# Patient Record
Sex: MALE | Race: WHITE | NOT HISPANIC OR LATINO | URBAN - METROPOLITAN AREA
[De-identification: names, ages, dates, MRNs, and addresses within clinical notes are randomized per-mention and may not be internally consistent; named-entity substitution may affect disease eponyms.]

---

## 2017-02-04 ENCOUNTER — EMERGENCY (EMERGENCY)
Facility: HOSPITAL | Age: 82
LOS: 1 days | Discharge: HOME | End: 2017-02-04
Attending: EMERGENCY MEDICINE | Admitting: EMERGENCY MEDICINE
Payer: MEDICARE

## 2017-02-04 VITALS
SYSTOLIC BLOOD PRESSURE: 140 MMHG | HEART RATE: 60 BPM | DIASTOLIC BLOOD PRESSURE: 86 MMHG | OXYGEN SATURATION: 98 % | RESPIRATION RATE: 18 BRPM | TEMPERATURE: 97 F

## 2017-02-04 VITALS
SYSTOLIC BLOOD PRESSURE: 157 MMHG | DIASTOLIC BLOOD PRESSURE: 78 MMHG | RESPIRATION RATE: 18 BRPM | OXYGEN SATURATION: 97 % | TEMPERATURE: 98 F | HEART RATE: 79 BPM
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
## 2017-02-04 DIAGNOSIS — Z86.79 PERSONAL HISTORY OF OTHER DISEASES OF THE CIRCULATORY SYSTEM: ICD-10-CM

## 2017-02-04 DIAGNOSIS — R10.9 UNSPECIFIED ABDOMINAL PAIN: ICD-10-CM
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
## 2017-02-04 PROCEDURE — 12005 RPR S/N/A/GEN/TRK12.6-20.0CM: CPT

## 2017-02-04 PROCEDURE — G0168: CPT | Mod: 59

## 2017-02-04 PROCEDURE — 99283 EMERGENCY DEPT VISIT LOW MDM: CPT | Mod: 25,GC

## 2017-02-04 PROCEDURE — 12001 RPR S/N/AX/GEN/TRNK 2.5CM/<: CPT | Mod: GC

## 2017-02-04 PROCEDURE — 99282 EMERGENCY DEPT VISIT SF MDM: CPT | Mod: 25

## 2017-02-04 RX ORDER — CEPHALEXIN 500 MG
1 CAPSULE ORAL
Qty: 30 | Refills: 0 | OUTPATIENT
Start: 2017-02-04 | End: 2017-02-14

## 2017-02-04 RX ORDER — CEPHALEXIN 500 MG
500 CAPSULE ORAL ONCE
Qty: 0 | Refills: 0 | Status: DISCONTINUED | OUTPATIENT
Start: 2017-02-04 | End: 2017-02-08

## 2017-02-04 NOTE — ED PROVIDER NOTE - MEDICAL DECISION MAKING DETAILS
ATTD: skin tear, skin repeat with dermabond, last tetanus approx 5 yrs ago., refuses pain medication.

## 2017-02-04 NOTE — ED PROCEDURE NOTE - PROCEDURE ADDITIONAL DETAILS
Please note all procedures were performed on Feb 4th at the time of the patient care that day. All procedure notes were entered in at a later time.

## 2017-02-04 NOTE — ED PROCEDURE NOTE - NS ED PROCEDURE ASSISTED BY
The procedure was performed independently
The procedure was performed independently
Supervision was available

## 2017-02-04 NOTE — ED PROCEDURE NOTE - CPROC ED POST PROC CARE GUIDE1
Verbal/written post procedure instructions were given to patient/caregiver.
Verbal/written post procedure instructions were given to patient/caregiver.

## 2017-02-04 NOTE — ED PROVIDER NOTE - ATTENDING CONTRIBUTION TO CARE
87 y/o m with pmhx afib on ac presents for bleeding from left arm / forearm and right knee. Was walking in the hospital getting some items from the cafeteria and tripped and fell. + skin tear. no LOC. no vomiting. uknown las tet.   Gen. no acute distress, Non toxic   HEENT: EOMI, mmm,   Lungs: CTAB/L no C/ W /R   CVS: S1S2   Abd; Soft non tender, non distended   Ext: skin tear of left forearm on the lateral surface approx 8 cm in length . second skin tear on left forearm on distal medial aspect. approx 6 cm. min active bleeding. no tenderness. no deformity of extretmities.   right leg approx 3 cm skin tear at prox tibia. no tenderness. no head trauma. no loc. had prior skin tears on lower legs distal to areas of new tears that he had followed with last week with wound management at [place].   Neuro AAOx3 non focal clear speech

## 2017-02-04 NOTE — ED PROVIDER NOTE - CARE PLAN
Principal Discharge DX:	Laceration of multiple sites of left upper extremity, initial encounter  Instructions for follow-up, activity and diet:	1.Take Tylenol as needed for pain  2. Keep wounds clean and dry  3. Take  Secondary Diagnosis:	Lacerations of multiple sites of right leg, initial encounter Principal Discharge DX:	Laceration of multiple sites of left upper extremity, initial encounter  Instructions for follow-up, activity and diet:	1. Take Tylenol as needed for pain  2. Keep wounds clean and dry, do not get wet for two days  3. Take Keflex as prescribed three times a day  4. Follow-up in emergency department or with primary care doctor in two days for wound check  5. Follow-up in emergency department or with primary care doctor in 7-10 days for suture removal (you had 4 sutures placed)  6. Return immediately for any worsening or concerning symptoms  Secondary Diagnosis:	Lacerations of multiple sites of right leg, initial encounter

## 2017-02-04 NOTE — ED PROVIDER NOTE - PLAN OF CARE
1.Take Tylenol as needed for pain  2. Keep wounds clean and dry  3. Take 1. Take Tylenol as needed for pain  2. Keep wounds clean and dry, do not get wet for two days  3. Take Keflex as prescribed three times a day  4. Follow-up in emergency department or with primary care doctor in two days for wound check  5. Follow-up in emergency department or with primary care doctor in 7-10 days for suture removal (you had 4 sutures placed)  6. Return immediately for any worsening or concerning symptoms

## 2017-02-04 NOTE — ED PROCEDURE NOTE - CPROC ED TOLERANCE1
Patient tolerated procedure well.

## 2017-02-04 NOTE — ED PROVIDER NOTE - OBJECTIVE STATEMENT
86M with [facility] of 86M, pmh of a-fib but on 81mg asa only, presents with skin tears/laceration to L forearm, R shin. pt was walking in cafeteria at hospital, tripped and fell forward. no head injury. no LOC. no headache, dizzines. no cp, sob, dizziness, palpitations prior to fall. no numbness, weakness, change in vision, nausea vomiting.

## 2017-02-04 NOTE — ED ADULT NURSE NOTE - OBJECTIVE STATEMENT
86 yr old male coming in s/p mechanical fall outside; denies hitting head, no LOC; on baby aspirin. Patient has significant skin tears on left arm/forearm; also on right knee. Patient A&Ox3 denies pain, moves all extremities. No chest pain, sob, n/v/d, fevers, chills. +PERRL. Clear speech. 86 yr old male coming in s/p mechanical fall outside; denies hitting head, no LOC; on baby aspirin. Patient has significant skin tears on left arm/forearm; also on right knee. Patient A&Ox3 denies pain, moves all extremities. No chest pain, sob, n/v/d, fevers, chills. +PERRL. Clear speech. Tetanus up to date.

## 2017-02-04 NOTE — ED ADULT NURSE REASSESSMENT NOTE - NS ED NURSE REASSESS COMMENT FT1
Report received from mc escobedo in [facility]; patient resting comfortably in stretcher; awaiting MD intervention with dermabond; safety and comfort measures provided; a&ox3

## 2018-05-31 PROBLEM — Z00.00 ENCOUNTER FOR PREVENTIVE HEALTH EXAMINATION: Status: ACTIVE | Noted: 2018-05-31

## 2018-06-04 ENCOUNTER — APPOINTMENT (OUTPATIENT)
Dept: SURGICAL ONCOLOGY | Facility: CLINIC | Age: 83
End: 2018-06-04
Payer: MEDICARE

## 2018-06-04 DIAGNOSIS — Z78.9 OTHER SPECIFIED HEALTH STATUS: ICD-10-CM

## 2018-06-04 DIAGNOSIS — Z86.79 PERSONAL HISTORY OF OTHER DISEASES OF THE CIRCULATORY SYSTEM: ICD-10-CM

## 2018-06-04 DIAGNOSIS — Z87.2 PERSONAL HISTORY OF DISEASES OF THE SKIN AND SUBCUTANEOUS TISSUE: ICD-10-CM

## 2018-06-04 DIAGNOSIS — Z86.69 PERSONAL HISTORY OF OTHER DISEASES OF THE NERVOUS SYSTEM AND SENSE ORGANS: ICD-10-CM

## 2018-06-04 DIAGNOSIS — I51.9 HEART DISEASE, UNSPECIFIED: ICD-10-CM

## 2018-06-04 DIAGNOSIS — Z86.2 PERSONAL HISTORY OF DISEASES OF THE BLOOD AND BLOOD-FORMING ORGANS AND CERTAIN DISORDERS INVOLVING THE IMMUNE MECHANISM: ICD-10-CM

## 2018-06-04 PROCEDURE — 99204 OFFICE O/P NEW MOD 45 MIN: CPT

## 2018-06-04 RX ORDER — APIXABAN 5 MG/1
5 TABLET, FILM COATED ORAL
Refills: 0 | Status: ACTIVE | COMMUNITY

## 2018-06-04 RX ORDER — AMLODIPINE BESYLATE 5 MG/1
5 TABLET ORAL
Refills: 0 | Status: ACTIVE | COMMUNITY

## 2018-06-04 RX ORDER — ATORVASTATIN CALCIUM 10 MG/1
10 TABLET, FILM COATED ORAL
Refills: 0 | Status: ACTIVE | COMMUNITY

## 2018-06-04 RX ORDER — FUROSEMIDE 20 MG/1
20 TABLET ORAL
Refills: 0 | Status: ACTIVE | COMMUNITY

## 2018-06-06 ENCOUNTER — FORM ENCOUNTER (OUTPATIENT)
Age: 83
End: 2018-06-06

## 2018-06-07 ENCOUNTER — APPOINTMENT (OUTPATIENT)
Dept: NUCLEAR MEDICINE | Facility: IMAGING CENTER | Age: 83
End: 2018-06-07
Payer: MEDICARE

## 2018-06-07 ENCOUNTER — OUTPATIENT (OUTPATIENT)
Dept: OUTPATIENT SERVICES | Facility: HOSPITAL | Age: 83
LOS: 1 days | End: 2018-06-07
Payer: MEDICARE

## 2018-06-07 DIAGNOSIS — C43.71 MALIGNANT MELANOMA OF RIGHT LOWER LIMB, INCLUDING HIP: ICD-10-CM

## 2018-06-07 PROCEDURE — 78816 PET IMAGE W/CT FULL BODY: CPT | Mod: 26,PI

## 2018-06-07 PROCEDURE — A9552: CPT

## 2018-06-07 PROCEDURE — 78816 PET IMAGE W/CT FULL BODY: CPT

## 2018-06-14 ENCOUNTER — OUTPATIENT (OUTPATIENT)
Dept: OUTPATIENT SERVICES | Facility: HOSPITAL | Age: 83
LOS: 1 days | End: 2018-06-14
Payer: MEDICARE

## 2018-06-14 ENCOUNTER — RESULT REVIEW (OUTPATIENT)
Age: 83
End: 2018-06-14

## 2018-06-14 DIAGNOSIS — L98.9 DISORDER OF THE SKIN AND SUBCUTANEOUS TISSUE, UNSPECIFIED: ICD-10-CM

## 2018-06-14 PROCEDURE — 88321 CONSLTJ&REPRT SLD PREP ELSWR: CPT

## 2018-06-20 ENCOUNTER — OUTPATIENT (OUTPATIENT)
Dept: OUTPATIENT SERVICES | Facility: HOSPITAL | Age: 83
LOS: 1 days | Discharge: HOME | End: 2018-06-20

## 2018-06-20 DIAGNOSIS — C43.9 MALIGNANT MELANOMA OF SKIN, UNSPECIFIED: ICD-10-CM

## 2018-06-20 DIAGNOSIS — C34.90 MALIGNANT NEOPLASM OF UNSPECIFIED PART OF UNSPECIFIED BRONCHUS OR LUNG: ICD-10-CM

## 2018-06-21 ENCOUNTER — APPOINTMENT (OUTPATIENT)
Dept: HEMATOLOGY ONCOLOGY | Facility: CLINIC | Age: 83
End: 2018-06-21
Payer: MEDICARE

## 2018-06-21 VITALS
DIASTOLIC BLOOD PRESSURE: 74 MMHG | OXYGEN SATURATION: 98 % | SYSTOLIC BLOOD PRESSURE: 172 MMHG | BODY MASS INDEX: 22.99 KG/M2 | TEMPERATURE: 97.5 F | HEART RATE: 80 BPM | HEIGHT: 70.98 IN | RESPIRATION RATE: 16 BRPM | WEIGHT: 164.24 LBS

## 2018-06-21 DIAGNOSIS — Z95.0 PRESENCE OF CARDIAC PACEMAKER: ICD-10-CM

## 2018-06-21 PROCEDURE — 99205 OFFICE O/P NEW HI 60 MIN: CPT

## 2018-06-28 ENCOUNTER — APPOINTMENT (OUTPATIENT)
Dept: INFUSION THERAPY | Facility: HOSPITAL | Age: 83
End: 2018-06-28

## 2018-07-05 LAB — SURGICAL PATHOLOGY STUDY: SIGNIFICANT CHANGE UP

## 2018-07-18 ENCOUNTER — RESULT REVIEW (OUTPATIENT)
Age: 83
End: 2018-07-18

## 2018-07-18 ENCOUNTER — LABORATORY RESULT (OUTPATIENT)
Age: 83
End: 2018-07-18

## 2018-07-18 ENCOUNTER — APPOINTMENT (OUTPATIENT)
Dept: HEMATOLOGY ONCOLOGY | Facility: CLINIC | Age: 83
End: 2018-07-18
Payer: MEDICARE

## 2018-07-18 VITALS
SYSTOLIC BLOOD PRESSURE: 187 MMHG | BODY MASS INDEX: 22.92 KG/M2 | DIASTOLIC BLOOD PRESSURE: 85 MMHG | OXYGEN SATURATION: 97 % | HEART RATE: 68 BPM | TEMPERATURE: 97.7 F | WEIGHT: 164.24 LBS | RESPIRATION RATE: 16 BRPM

## 2018-07-18 DIAGNOSIS — T45.1X5A NAUSEA WITH VOMITING, UNSPECIFIED: ICD-10-CM

## 2018-07-18 DIAGNOSIS — R11.2 NAUSEA WITH VOMITING, UNSPECIFIED: ICD-10-CM

## 2018-07-18 LAB
ALBUMIN SERPL ELPH-MCNC: 4.5 G/DL
ALP BLD-CCNC: 82 U/L
ALT SERPL-CCNC: 19 U/L
ANION GAP SERPL CALC-SCNC: 16 MMOL/L
APTT BLD: 28.8 SEC
AST SERPL-CCNC: 21 U/L
BASOPHILS # BLD AUTO: 0 K/UL — SIGNIFICANT CHANGE UP (ref 0–0.2)
BASOPHILS NFR BLD AUTO: 0.2 % — SIGNIFICANT CHANGE UP (ref 0–2)
BILIRUB SERPL-MCNC: 0.4 MG/DL
BUN SERPL-MCNC: 31 MG/DL
CALCIUM SERPL-MCNC: 9.6 MG/DL
CHLORIDE SERPL-SCNC: 102 MMOL/L
CO2 SERPL-SCNC: 26 MMOL/L
CREAT SERPL-MCNC: 1.89 MG/DL
EOSINOPHIL # BLD AUTO: 0.3 K/UL — SIGNIFICANT CHANGE UP (ref 0–0.5)
EOSINOPHIL NFR BLD AUTO: 2.3 % — SIGNIFICANT CHANGE UP (ref 0–6)
GLUCOSE SERPL-MCNC: 112 MG/DL
HCT VFR BLD CALC: 39.3 % — SIGNIFICANT CHANGE UP (ref 39–50)
HGB BLD-MCNC: 13.2 G/DL — SIGNIFICANT CHANGE UP (ref 13–17)
INR PPP: 1.02 RATIO
LYMPHOCYTES # BLD AUTO: 1.1 K/UL — SIGNIFICANT CHANGE UP (ref 1–3.3)
LYMPHOCYTES # BLD AUTO: 9.4 % — LOW (ref 13–44)
MCHC RBC-ENTMCNC: 28.4 PG — SIGNIFICANT CHANGE UP (ref 27–34)
MCHC RBC-ENTMCNC: 33.7 G/DL — SIGNIFICANT CHANGE UP (ref 32–36)
MCV RBC AUTO: 84.3 FL — SIGNIFICANT CHANGE UP (ref 80–100)
MONOCYTES # BLD AUTO: 0.8 K/UL — SIGNIFICANT CHANGE UP (ref 0–0.9)
MONOCYTES NFR BLD AUTO: 7 % — SIGNIFICANT CHANGE UP (ref 2–14)
NEUTROPHILS # BLD AUTO: 9.5 K/UL — HIGH (ref 1.8–7.4)
NEUTROPHILS NFR BLD AUTO: 81.1 % — HIGH (ref 43–77)
PLATELET # BLD AUTO: 267 K/UL — SIGNIFICANT CHANGE UP (ref 150–400)
POTASSIUM SERPL-SCNC: 4.8 MMOL/L
PROT SERPL-MCNC: 6.7 G/DL
PT BLD: 11.5 SEC
RBC # BLD: 4.67 M/UL — SIGNIFICANT CHANGE UP (ref 4.2–5.8)
RBC # FLD: 14.8 % — HIGH (ref 10.3–14.5)
SODIUM SERPL-SCNC: 144 MMOL/L
T3RU NFR SERPL: 1.01 INDEX
T4 SERPL-MCNC: 5.7 UG/DL
WBC # BLD: 11.7 K/UL — HIGH (ref 3.8–10.5)
WBC # FLD AUTO: 11.7 K/UL — HIGH (ref 3.8–10.5)

## 2018-07-18 PROCEDURE — 99215 OFFICE O/P EST HI 40 MIN: CPT

## 2018-07-19 ENCOUNTER — OUTPATIENT (OUTPATIENT)
Dept: OUTPATIENT SERVICES | Facility: HOSPITAL | Age: 83
LOS: 1 days | Discharge: HOME | End: 2018-07-19

## 2018-07-19 DIAGNOSIS — C34.90 MALIGNANT NEOPLASM OF UNSPECIFIED PART OF UNSPECIFIED BRONCHUS OR LUNG: ICD-10-CM

## 2018-07-19 LAB
HAV IGM SER QL: NONREACTIVE
HBV CORE IGM SER QL: NONREACTIVE
HBV SURFACE AG SER QL: NONREACTIVE
HCV AB SER QL: NONREACTIVE
HCV S/CO RATIO: 0.15 S/CO

## 2018-07-24 ENCOUNTER — MEDICATION RENEWAL (OUTPATIENT)
Age: 83
End: 2018-07-24

## 2018-07-24 ENCOUNTER — APPOINTMENT (OUTPATIENT)
Dept: INFUSION THERAPY | Facility: HOSPITAL | Age: 83
End: 2018-07-24

## 2018-07-24 RX ORDER — METOCLOPRAMIDE 10 MG/1
10 TABLET ORAL 3 TIMES DAILY
Qty: 30 | Refills: 2 | Status: ACTIVE | COMMUNITY
Start: 2018-07-18 | End: 1900-01-01

## 2018-07-25 DIAGNOSIS — Z51.11 ENCOUNTER FOR ANTINEOPLASTIC CHEMOTHERAPY: ICD-10-CM

## 2018-08-06 LAB
CORTICOSTEROID BIND GLOBULIN: 2.4 MG/DL
CORTIS SERPL-MCNC: <1 UG/DL
CORTISOL, FREE: <0.02 UG/DL
PFCX: <2.3 %

## 2018-08-07 ENCOUNTER — APPOINTMENT (OUTPATIENT)
Dept: INFUSION THERAPY | Facility: HOSPITAL | Age: 83
End: 2018-08-07

## 2018-08-14 ENCOUNTER — APPOINTMENT (OUTPATIENT)
Dept: HEMATOLOGY ONCOLOGY | Facility: CLINIC | Age: 83
End: 2018-08-14
Payer: MEDICARE

## 2018-08-14 ENCOUNTER — RESULT REVIEW (OUTPATIENT)
Age: 83
End: 2018-08-14

## 2018-08-14 VITALS
BODY MASS INDEX: 23.16 KG/M2 | DIASTOLIC BLOOD PRESSURE: 79 MMHG | WEIGHT: 165.99 LBS | SYSTOLIC BLOOD PRESSURE: 175 MMHG | TEMPERATURE: 97.8 F | OXYGEN SATURATION: 96 % | HEART RATE: 70 BPM | RESPIRATION RATE: 16 BRPM

## 2018-08-14 DIAGNOSIS — L29.9 PRURITUS, UNSPECIFIED: ICD-10-CM

## 2018-08-14 DIAGNOSIS — Z87.891 PERSONAL HISTORY OF NICOTINE DEPENDENCE: ICD-10-CM

## 2018-08-14 LAB
ALBUMIN SERPL ELPH-MCNC: 4 G/DL
ALP BLD-CCNC: 95 U/L
ALT SERPL-CCNC: 17 U/L
ANION GAP SERPL CALC-SCNC: 14 MMOL/L
AST SERPL-CCNC: 21 U/L
BILIRUB SERPL-MCNC: 0.4 MG/DL
BUN SERPL-MCNC: 25 MG/DL
CALCIUM SERPL-MCNC: 9.3 MG/DL
CHLORIDE SERPL-SCNC: 102 MMOL/L
CO2 SERPL-SCNC: 26 MMOL/L
CREAT SERPL-MCNC: 1.66 MG/DL
GLUCOSE SERPL-MCNC: 112 MG/DL
HCT VFR BLD CALC: 37.2 % — LOW (ref 39–50)
HGB BLD-MCNC: 12.3 G/DL — LOW (ref 13–17)
MCHC RBC-ENTMCNC: 27.8 PG — SIGNIFICANT CHANGE UP (ref 27–34)
MCHC RBC-ENTMCNC: 33.1 G/DL — SIGNIFICANT CHANGE UP (ref 32–36)
MCV RBC AUTO: 84.2 FL — SIGNIFICANT CHANGE UP (ref 80–100)
PLATELET # BLD AUTO: 242 K/UL — SIGNIFICANT CHANGE UP (ref 150–400)
POTASSIUM SERPL-SCNC: 4.9 MMOL/L
PROT SERPL-MCNC: 6.5 G/DL
RBC # BLD: 4.42 M/UL — SIGNIFICANT CHANGE UP (ref 4.2–5.8)
RBC # FLD: 15.1 % — HIGH (ref 10.3–14.5)
SODIUM SERPL-SCNC: 142 MMOL/L
T3FREE SERPL-MCNC: 2.77 PG/ML
T4 FREE SERPL-MCNC: 0.9 NG/DL
TSH SERPL-ACNC: 2.45 UIU/ML
WBC # BLD: 13.6 K/UL — HIGH (ref 3.8–10.5)
WBC # FLD AUTO: 13.6 K/UL — HIGH (ref 3.8–10.5)

## 2018-08-14 PROCEDURE — 99214 OFFICE O/P EST MOD 30 MIN: CPT

## 2018-08-15 ENCOUNTER — OUTPATIENT (OUTPATIENT)
Dept: OUTPATIENT SERVICES | Facility: HOSPITAL | Age: 83
LOS: 1 days | Discharge: HOME | End: 2018-08-15

## 2018-08-15 DIAGNOSIS — C34.90 MALIGNANT NEOPLASM OF UNSPECIFIED PART OF UNSPECIFIED BRONCHUS OR LUNG: ICD-10-CM

## 2018-08-21 ENCOUNTER — APPOINTMENT (OUTPATIENT)
Dept: INFUSION THERAPY | Facility: HOSPITAL | Age: 83
End: 2018-08-21

## 2018-08-21 LAB
CORTICOSTEROID BIND GLOBULIN: 2.1 MG/DL
CORTIS SERPL-MCNC: <1 UG/DL
CORTISOL, FREE: <0.03 UG/DL
PFCX: <2.6 %

## 2018-08-22 DIAGNOSIS — Z51.11 ENCOUNTER FOR ANTINEOPLASTIC CHEMOTHERAPY: ICD-10-CM

## 2018-09-06 PROBLEM — Z87.891 FORMER SMOKER: Status: ACTIVE | Noted: 2018-06-21

## 2018-09-06 PROBLEM — L29.9 ITCHING: Status: ACTIVE | Noted: 2018-08-14

## 2018-09-11 ENCOUNTER — APPOINTMENT (OUTPATIENT)
Dept: HEMATOLOGY ONCOLOGY | Facility: CLINIC | Age: 83
End: 2018-09-11
Payer: MEDICARE

## 2018-09-11 ENCOUNTER — RESULT REVIEW (OUTPATIENT)
Age: 83
End: 2018-09-11

## 2018-09-11 VITALS
SYSTOLIC BLOOD PRESSURE: 175 MMHG | OXYGEN SATURATION: 97 % | HEART RATE: 68 BPM | TEMPERATURE: 97.4 F | RESPIRATION RATE: 16 BRPM | DIASTOLIC BLOOD PRESSURE: 78 MMHG | BODY MASS INDEX: 23.07 KG/M2 | WEIGHT: 165.35 LBS

## 2018-09-11 LAB
ALBUMIN SERPL ELPH-MCNC: 4.3 G/DL
ALP BLD-CCNC: 89 U/L
ALT SERPL-CCNC: 23 U/L
ANION GAP SERPL CALC-SCNC: 18 MMOL/L
AST SERPL-CCNC: 25 U/L
BILIRUB SERPL-MCNC: 0.6 MG/DL
BUN SERPL-MCNC: 26 MG/DL
CALCIUM SERPL-MCNC: 9 MG/DL
CHLORIDE SERPL-SCNC: 99 MMOL/L
CO2 SERPL-SCNC: 24 MMOL/L
CREAT SERPL-MCNC: 1.69 MG/DL
GLUCOSE SERPL-MCNC: 127 MG/DL
HCT VFR BLD CALC: 41.5 % — SIGNIFICANT CHANGE UP (ref 39–50)
HGB BLD-MCNC: 13.5 G/DL — SIGNIFICANT CHANGE UP (ref 13–17)
MCHC RBC-ENTMCNC: 28.1 PG — SIGNIFICANT CHANGE UP (ref 27–34)
MCHC RBC-ENTMCNC: 32.5 G/DL — SIGNIFICANT CHANGE UP (ref 32–36)
MCV RBC AUTO: 86.7 FL — SIGNIFICANT CHANGE UP (ref 80–100)
PLATELET # BLD AUTO: 260 K/UL — SIGNIFICANT CHANGE UP (ref 150–400)
POTASSIUM SERPL-SCNC: 4.2 MMOL/L
PROT SERPL-MCNC: 6.6 G/DL
RBC # BLD: 4.79 M/UL — SIGNIFICANT CHANGE UP (ref 4.2–5.8)
RBC # FLD: 15.7 % — HIGH (ref 10.3–14.5)
SODIUM SERPL-SCNC: 141 MMOL/L
T3FREE SERPL-MCNC: 2.98 PG/ML
T4 FREE SERPL-MCNC: 1 NG/DL
TSH SERPL-ACNC: 4.47 UIU/ML
WBC # BLD: 12.4 K/UL — HIGH (ref 3.8–10.5)
WBC # FLD AUTO: 12.4 K/UL — HIGH (ref 3.8–10.5)

## 2018-09-11 PROCEDURE — 99215 OFFICE O/P EST HI 40 MIN: CPT

## 2018-09-12 ENCOUNTER — OUTPATIENT (OUTPATIENT)
Dept: OUTPATIENT SERVICES | Facility: HOSPITAL | Age: 83
LOS: 1 days | Discharge: HOME | End: 2018-09-12

## 2018-09-12 DIAGNOSIS — C34.90 MALIGNANT NEOPLASM OF UNSPECIFIED PART OF UNSPECIFIED BRONCHUS OR LUNG: ICD-10-CM

## 2018-09-17 LAB
CORTICOSTEROID BIND GLOBULIN: 2.2 MG/DL
CORTIS SERPL-MCNC: 1.6 UG/DL
CORTISOL, FREE: 0.04 UG/DL
PFCX: 2.6 %

## 2018-09-17 RX ORDER — HYDROCORTISONE 10 MG/1
10 TABLET ORAL
Qty: 90 | Refills: 5 | Status: ACTIVE | COMMUNITY
Start: 2018-09-17 | End: 1900-01-01

## 2018-09-18 ENCOUNTER — RESULT REVIEW (OUTPATIENT)
Age: 83
End: 2018-09-18

## 2018-09-18 ENCOUNTER — LABORATORY RESULT (OUTPATIENT)
Age: 83
End: 2018-09-18

## 2018-09-18 ENCOUNTER — APPOINTMENT (OUTPATIENT)
Dept: INFUSION THERAPY | Facility: HOSPITAL | Age: 83
End: 2018-09-18

## 2018-09-18 LAB
HCT VFR BLD CALC: 41.4 % — SIGNIFICANT CHANGE UP (ref 39–50)
HGB BLD-MCNC: 13.3 G/DL — SIGNIFICANT CHANGE UP (ref 13–17)
MCHC RBC-ENTMCNC: 28 PG — SIGNIFICANT CHANGE UP (ref 27–34)
MCHC RBC-ENTMCNC: 32.2 G/DL — SIGNIFICANT CHANGE UP (ref 32–36)
MCV RBC AUTO: 87.1 FL — SIGNIFICANT CHANGE UP (ref 80–100)
PLATELET # BLD AUTO: 279 K/UL — SIGNIFICANT CHANGE UP (ref 150–400)
RBC # BLD: 4.75 M/UL — SIGNIFICANT CHANGE UP (ref 4.2–5.8)
RBC # FLD: 15.6 % — HIGH (ref 10.3–14.5)
WBC # BLD: 8.6 K/UL — SIGNIFICANT CHANGE UP (ref 3.8–10.5)
WBC # FLD AUTO: 8.6 K/UL — SIGNIFICANT CHANGE UP (ref 3.8–10.5)

## 2018-09-19 DIAGNOSIS — Z51.11 ENCOUNTER FOR ANTINEOPLASTIC CHEMOTHERAPY: ICD-10-CM

## 2018-09-19 DIAGNOSIS — R11.2 NAUSEA WITH VOMITING, UNSPECIFIED: ICD-10-CM

## 2018-10-02 ENCOUNTER — APPOINTMENT (OUTPATIENT)
Dept: HEMATOLOGY ONCOLOGY | Facility: CLINIC | Age: 83
End: 2018-10-02
Payer: MEDICARE

## 2018-10-02 VITALS
SYSTOLIC BLOOD PRESSURE: 168 MMHG | OXYGEN SATURATION: 97 % | HEART RATE: 72 BPM | TEMPERATURE: 97.8 F | RESPIRATION RATE: 16 BRPM | WEIGHT: 165.34 LBS | BODY MASS INDEX: 23.07 KG/M2 | DIASTOLIC BLOOD PRESSURE: 80 MMHG

## 2018-10-02 PROCEDURE — 99214 OFFICE O/P EST MOD 30 MIN: CPT

## 2018-10-09 ENCOUNTER — APPOINTMENT (OUTPATIENT)
Dept: INFUSION THERAPY | Facility: HOSPITAL | Age: 83
End: 2018-10-09

## 2018-10-15 ENCOUNTER — MEDICATION RENEWAL (OUTPATIENT)
Age: 83
End: 2018-10-15

## 2018-10-17 ENCOUNTER — OUTPATIENT (OUTPATIENT)
Dept: OUTPATIENT SERVICES | Facility: HOSPITAL | Age: 83
LOS: 1 days | Discharge: HOME | End: 2018-10-17

## 2018-10-17 DIAGNOSIS — C34.90 MALIGNANT NEOPLASM OF UNSPECIFIED PART OF UNSPECIFIED BRONCHUS OR LUNG: ICD-10-CM

## 2018-10-23 ENCOUNTER — RESULT REVIEW (OUTPATIENT)
Age: 83
End: 2018-10-23

## 2018-10-23 ENCOUNTER — APPOINTMENT (OUTPATIENT)
Dept: HEMATOLOGY ONCOLOGY | Facility: CLINIC | Age: 83
End: 2018-10-23
Payer: MEDICARE

## 2018-10-23 VITALS
OXYGEN SATURATION: 98 % | WEIGHT: 161.82 LBS | SYSTOLIC BLOOD PRESSURE: 175 MMHG | TEMPERATURE: 97.8 F | DIASTOLIC BLOOD PRESSURE: 88 MMHG | HEART RATE: 67 BPM | BODY MASS INDEX: 22.58 KG/M2 | RESPIRATION RATE: 17 BRPM

## 2018-10-23 DIAGNOSIS — B37.0 CANDIDAL STOMATITIS: ICD-10-CM

## 2018-10-23 LAB
HCT VFR BLD CALC: 44.3 % — SIGNIFICANT CHANGE UP (ref 39–50)
HGB BLD-MCNC: 14.4 G/DL — SIGNIFICANT CHANGE UP (ref 13–17)
MCHC RBC-ENTMCNC: 28.3 PG — SIGNIFICANT CHANGE UP (ref 27–34)
MCHC RBC-ENTMCNC: 32.6 G/DL — SIGNIFICANT CHANGE UP (ref 32–36)
MCV RBC AUTO: 86.8 FL — SIGNIFICANT CHANGE UP (ref 80–100)
PLATELET # BLD AUTO: 305 K/UL — SIGNIFICANT CHANGE UP (ref 150–400)
RBC # BLD: 5.1 M/UL — SIGNIFICANT CHANGE UP (ref 4.2–5.8)
RBC # FLD: 14.3 % — SIGNIFICANT CHANGE UP (ref 10.3–14.5)
WBC # BLD: 9.5 K/UL — SIGNIFICANT CHANGE UP (ref 3.8–10.5)
WBC # FLD AUTO: 9.5 K/UL — SIGNIFICANT CHANGE UP (ref 3.8–10.5)

## 2018-10-23 PROCEDURE — 99215 OFFICE O/P EST HI 40 MIN: CPT

## 2018-10-24 LAB
ALBUMIN SERPL ELPH-MCNC: 3.5 G/DL
ALP BLD-CCNC: 71 U/L
ALT SERPL-CCNC: 15 U/L
ANION GAP SERPL CALC-SCNC: 12 MMOL/L
AST SERPL-CCNC: 16 U/L
BILIRUB SERPL-MCNC: 0.4 MG/DL
BUN SERPL-MCNC: 38 MG/DL
CALCIUM SERPL-MCNC: 9.1 MG/DL
CHLORIDE SERPL-SCNC: 100 MMOL/L
CO2 SERPL-SCNC: 28 MMOL/L
CREAT SERPL-MCNC: 2.14 MG/DL
GLUCOSE SERPL-MCNC: 130 MG/DL
POTASSIUM SERPL-SCNC: 4.7 MMOL/L
PROT SERPL-MCNC: 6 G/DL
SODIUM SERPL-SCNC: 140 MMOL/L
T3FREE SERPL-MCNC: 1.77 PG/ML
T4 FREE SERPL-MCNC: 0.8 NG/DL
TSH SERPL-ACNC: 1.07 UIU/ML

## 2018-10-30 LAB
CORTICOSTEROID BIND GLOBULIN: 1.8 MG/DL
CORTIS SERPL-MCNC: 1.1 UG/DL
CORTISOL, FREE: 0.03 UG/DL
PFCX: 3.1 %

## 2018-10-31 ENCOUNTER — APPOINTMENT (OUTPATIENT)
Dept: INFUSION THERAPY | Facility: HOSPITAL | Age: 83
End: 2018-10-31

## 2018-11-08 RX ORDER — OMEPRAZOLE 20 MG/1
20 CAPSULE, DELAYED RELEASE ORAL
Qty: 30 | Refills: 1 | Status: ACTIVE | COMMUNITY
Start: 2018-11-08 | End: 1900-01-01

## 2018-11-13 ENCOUNTER — RESULT REVIEW (OUTPATIENT)
Age: 83
End: 2018-11-13

## 2018-11-13 ENCOUNTER — APPOINTMENT (OUTPATIENT)
Dept: HEMATOLOGY ONCOLOGY | Facility: CLINIC | Age: 83
End: 2018-11-13
Payer: MEDICARE

## 2018-11-13 VITALS
SYSTOLIC BLOOD PRESSURE: 150 MMHG | OXYGEN SATURATION: 99 % | RESPIRATION RATE: 16 BRPM | DIASTOLIC BLOOD PRESSURE: 80 MMHG | WEIGHT: 163.14 LBS | TEMPERATURE: 98 F | HEART RATE: 82 BPM | BODY MASS INDEX: 22.76 KG/M2

## 2018-11-13 DIAGNOSIS — Z92.241 PERSONAL HISTORY OF SYSTEMIC STEROID THERAPY: ICD-10-CM

## 2018-11-13 DIAGNOSIS — K30 FUNCTIONAL DYSPEPSIA: ICD-10-CM

## 2018-11-13 LAB
ALBUMIN SERPL ELPH-MCNC: 3.2 G/DL
ALP BLD-CCNC: 95 U/L
ALT SERPL-CCNC: 39 U/L
ANION GAP SERPL CALC-SCNC: 15 MMOL/L
AST SERPL-CCNC: 24 U/L
BILIRUB SERPL-MCNC: 0.4 MG/DL
BUN SERPL-MCNC: 31 MG/DL
CALCIUM SERPL-MCNC: 8.6 MG/DL
CHLORIDE SERPL-SCNC: 97 MMOL/L
CO2 SERPL-SCNC: 28 MMOL/L
CREAT SERPL-MCNC: 1.87 MG/DL
GLUCOSE SERPL-MCNC: 186 MG/DL
HCT VFR BLD CALC: 39.7 % — SIGNIFICANT CHANGE UP (ref 39–50)
HGB BLD-MCNC: 13.4 G/DL — SIGNIFICANT CHANGE UP (ref 13–17)
MCHC RBC-ENTMCNC: 29.5 PG — SIGNIFICANT CHANGE UP (ref 27–34)
MCHC RBC-ENTMCNC: 33.6 G/DL — SIGNIFICANT CHANGE UP (ref 32–36)
MCV RBC AUTO: 87.7 FL — SIGNIFICANT CHANGE UP (ref 80–100)
PLATELET # BLD AUTO: 154 K/UL — SIGNIFICANT CHANGE UP (ref 150–400)
POTASSIUM SERPL-SCNC: 4 MMOL/L
PROT SERPL-MCNC: 5.5 G/DL
RBC # BLD: 4.53 M/UL — SIGNIFICANT CHANGE UP (ref 4.2–5.8)
RBC # FLD: 15.2 % — HIGH (ref 10.3–14.5)
SODIUM SERPL-SCNC: 140 MMOL/L
T3FREE SERPL-MCNC: 1.75 PG/ML
T4 FREE SERPL-MCNC: 0.9 NG/DL
TSH SERPL-ACNC: 0.65 UIU/ML
WBC # BLD: 10 K/UL — SIGNIFICANT CHANGE UP (ref 3.8–10.5)
WBC # FLD AUTO: 10 K/UL — SIGNIFICANT CHANGE UP (ref 3.8–10.5)

## 2018-11-13 PROCEDURE — 99214 OFFICE O/P EST MOD 30 MIN: CPT

## 2018-11-13 NOTE — REASON FOR VISIT
[Follow-Up Visit] : a follow-up [Spouse] : spouse [FreeTextEntry2] : follow-up on melanoma, immunotherapy related diarrhea

## 2018-11-13 NOTE — HISTORY OF PRESENT ILLNESS
[Disease: _____________________] : Disease: [unfilled] [T: ___] : T[unfilled] [N: ___] : N[unfilled] [M: ___] : M[unfilled] [AJCC Stage: ____] : AJCC Stage: [unfilled] [Treatment Protocol] : Treatment Protocol [Cardiovascular] : Cardiovascular [Constitutional] : Constitutional [ENT] : ENT [Dermatologic] : Dermatologic [Endocrine] : Endocrine [Gastrointestinal] : Gastrointestinal [Genitourinary] : Genitourinary [Gynecologic] : Gynecologic [Infectious] : Infectious [Musculoskeletal] : Musculoskeletal [Neurologic] : Neurologic [Pain] : Pain [Pulmonary] : Pulmonary [Hematologic] : Hematologic [___________________________________] : Drug: [unfilled] [de-identified] : Rudi Gonsalves is a 87 year old male presenting to the office for evaluation of melanoma. He is referred here by Dr. Karri Ordoñez (surgery). Approximately 2 months ago, patient was hospitalized for a skin graft surgery due to a left leg hematoma. \par The patient has a prior history of skin melanoma, skin angiofibroma (2011), basal cell carcinoma (2013) and  folliculitis in 2015.\par Patient's wife noted a lesion on the top of his right foot, described a "hard blister". He saw Dr. Chance Godoy (dermatologist), who biopsied the lesion on 5/4/2018; results revealed a 1.3 mm melanoma, mitotic index of 6. Due to this finding, another lesion similar in appearance located more proximal on the right leg was biopsied and demonstrated metastatic melanoma. A PET/CT whole body scan was done on 6/4/2018 which noted postsurgical changes on his left leg and small focus over his right ankle but no evidence of disease throughout the remainder of the study. \par Past medical history includes previous history melanoma in 2008 (right chest by Dr. Kalyan Ackerman and 2014 by Dr. Govind Zimmer - surgically resolved each time), prostate cancer (diagnosed in 2001, treated with radiation seed implants by Dr. J. Bosworth). He is also noted to have hypertension, hypercholesteremia, coronary artery disease, atrial fibrillation. Past surgical history includes pacemaker placement, quadruple bypass (2013 at [facility]), left eye cataract surgery (2017). Patient is a former cigarette smoker (quit in 1965), denied illicit drug use.  [de-identified] : malignant melanoma [FreeTextEntry1] :  s/p 2 cycles nivolumab 240 mg, s/p 1 cycle nivolumab 480 mg, s/p 2 cycles of ipilimumab/nivolumab; currently on surveillance [de-identified] : Patient's prednisone use was reduced from 60 mg daily to 40 mg daily for his diarrhea and started on omeprazole last week to address his stomach irritation. His admitted that his GI related symptoms have significantly improved. His bilateral foot swelling remains unchanged.  [de-identified] : colitis

## 2018-11-13 NOTE — REVIEW OF SYSTEMS
[Fatigue] : fatigue [Lower Ext Edema] : lower extremity edema [SOB on Exertion] : shortness of breath during exertion [Abdominal Pain] : abdominal pain [Diarrhea] : diarrhea [Skin Rash] : skin rash [Difficulty Walking] : difficulty walking [Easy Bleeding] : a tendency for easy bleeding [Easy Bruising] : a tendency for easy bruising [Negative] : Allergic/Immunologic [Fever] : no fever [Chills] : no chills [Night Sweats] : no night sweats [Recent Change In Weight] : ~T no recent weight change [Eye Pain] : no eye pain [Red Eyes] : eyes not red [Dry Eyes] : no dryness of the eyes [Vision Problems] : no vision problems [Dysphagia] : no dysphagia [Loss of Hearing] : no loss of hearing [Nosebleeds] : no nosebleeds [Hoarseness] : no hoarseness [Odynophagia] : no odynophagia [Mucosal Pain] : no mucosal pain [Chest Pain] : no chest pain [Palpitations] : no palpitations [Leg Claudication] : no intermittent leg claudication [Shortness Of Breath] : no shortness of breath [Wheezing] : no wheezing [Cough] : no cough [Vomiting] : no vomiting [Constipation] : no constipation [Dysuria] : no dysuria [Incontinence] : no incontinence [Joint Pain] : no joint pain [Joint Stiffness] : no joint stiffness [Muscle Pain] : no muscle pain [Skin Wound] : no skin wound [Confused] : no confusion [Dizziness] : no dizziness [Fainting] : no fainting [Suicidal] : not suicidal [Insomnia] : no insomnia [Anxiety] : no anxiety [Depression] : no depression [Proptosis] : no proptosis [Hot Flashes] : no hot flashes [Muscle Weakness] : no muscle weakness [Deepening Of The Voice] : no deepening of the voice [Swollen Glands] : no swollen glands [de-identified] : skin itching

## 2018-11-13 NOTE — PHYSICAL EXAM
[Restricted in physically strenuous activity but ambulatory and able to carry out work of a light or sedentary nature] : Status 1- Restricted in physically strenuous activity but ambulatory and able to carry out work of a light or sedentary nature, e.g., light house work, office work [Normal] : affect appropriate [de-identified] : 1.0 cm X 2 cm; 0.5 X 0.5 medial aspect of the right knee; lesions are flatter; some disappeared

## 2018-11-13 NOTE — ASSESSMENT
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 He appeared well and his physical examination findings remain stable. He is advised to reduce his prednisone 40 mg to 20 mg daily, starting on 11/15/2018. No immunotherapy retreat is advised at this time. Plan to repeat imaging studies next month. Return to the office in 2 weeks. Seen and examined in conjunction with Josie PEGUERO

## 2018-11-15 ENCOUNTER — OUTPATIENT (OUTPATIENT)
Dept: OUTPATIENT SERVICES | Facility: HOSPITAL | Age: 83
LOS: 1 days | Discharge: HOME | End: 2018-11-15

## 2018-11-15 DIAGNOSIS — C34.90 MALIGNANT NEOPLASM OF UNSPECIFIED PART OF UNSPECIFIED BRONCHUS OR LUNG: ICD-10-CM

## 2018-11-21 ENCOUNTER — APPOINTMENT (OUTPATIENT)
Dept: INFUSION THERAPY | Facility: HOSPITAL | Age: 83
End: 2018-11-21

## 2018-11-26 LAB
CORTICOSTEROID BIND GLOBULIN: 1.7 MG/DL
CORTIS SERPL-MCNC: <1 UG/DL
CORTISOL, FREE: <0.03 UG/DL
PFCX: <3.3 %

## 2018-11-27 ENCOUNTER — INPATIENT (INPATIENT)
Facility: HOSPITAL | Age: 83
LOS: 8 days | Discharge: HOME | DRG: 871 | End: 2018-12-06
Attending: HOSPITALIST | Admitting: HOSPITALIST
Payer: MEDICARE

## 2018-11-27 ENCOUNTER — APPOINTMENT (OUTPATIENT)
Dept: HEMATOLOGY ONCOLOGY | Facility: CLINIC | Age: 83
End: 2018-11-27
Payer: MEDICARE

## 2018-11-27 VITALS
WEIGHT: 158 LBS | RESPIRATION RATE: 16 BRPM | DIASTOLIC BLOOD PRESSURE: 88 MMHG | BODY MASS INDEX: 22.05 KG/M2 | HEART RATE: 114 BPM | TEMPERATURE: 98.1 F | SYSTOLIC BLOOD PRESSURE: 171 MMHG | OXYGEN SATURATION: 94 %

## 2018-11-27 VITALS
HEART RATE: 105 BPM | OXYGEN SATURATION: 95 % | RESPIRATION RATE: 18 BRPM | DIASTOLIC BLOOD PRESSURE: 72 MMHG | SYSTOLIC BLOOD PRESSURE: 128 MMHG | TEMPERATURE: 99 F

## 2018-11-27 DIAGNOSIS — K52.1 TOXIC GASTROENTERITIS AND COLITIS: ICD-10-CM

## 2018-11-27 DIAGNOSIS — T45.1X5A TOXIC GASTROENTERITIS AND COLITIS: ICD-10-CM

## 2018-11-27 DIAGNOSIS — C43.71 MALIGNANT MELANOMA OF RIGHT LOWER LIMB, INCLUDING HIP: ICD-10-CM

## 2018-11-27 DIAGNOSIS — E27.40 UNSPECIFIED ADRENOCORTICAL INSUFFICIENCY: ICD-10-CM

## 2018-11-27 LAB
ALBUMIN SERPL ELPH-MCNC: 2.7 G/DL — LOW (ref 3.3–5)
ALBUMIN SERPL ELPH-MCNC: 2.9 G/DL — LOW (ref 3.3–5)
ALP SERPL-CCNC: 84 U/L — SIGNIFICANT CHANGE UP (ref 40–120)
ALP SERPL-CCNC: 99 U/L — SIGNIFICANT CHANGE UP (ref 40–120)
ALT FLD-CCNC: 23 U/L — SIGNIFICANT CHANGE UP (ref 10–45)
ALT FLD-CCNC: 30 U/L — SIGNIFICANT CHANGE UP (ref 10–45)
ANION GAP SERPL CALC-SCNC: 15 MMOL/L — SIGNIFICANT CHANGE UP (ref 5–17)
ANION GAP SERPL CALC-SCNC: 15 MMOL/L — SIGNIFICANT CHANGE UP (ref 5–17)
ANISOCYTOSIS BLD QL: SLIGHT — SIGNIFICANT CHANGE UP
APTT BLD: 25.7 SEC — LOW (ref 27.5–36.3)
AST SERPL-CCNC: 19 U/L — SIGNIFICANT CHANGE UP (ref 10–40)
AST SERPL-CCNC: 27 U/L — SIGNIFICANT CHANGE UP (ref 10–40)
BASE EXCESS BLDV CALC-SCNC: 2.6 MMOL/L — HIGH (ref -2–2)
BASOPHILS # BLD AUTO: 0 K/UL — SIGNIFICANT CHANGE UP (ref 0–0.2)
BILIRUB SERPL-MCNC: 0.3 MG/DL — SIGNIFICANT CHANGE UP (ref 0.2–1.2)
BILIRUB SERPL-MCNC: 0.4 MG/DL — SIGNIFICANT CHANGE UP (ref 0.2–1.2)
BUN SERPL-MCNC: 37 MG/DL — HIGH (ref 7–23)
BUN SERPL-MCNC: 38 MG/DL — HIGH (ref 7–23)
CA-I SERPL-SCNC: 1.01 MMOL/L — LOW (ref 1.12–1.3)
CALCIUM SERPL-MCNC: 7.8 MG/DL — LOW (ref 8.4–10.5)
CALCIUM SERPL-MCNC: 8.1 MG/DL — LOW (ref 8.4–10.5)
CHLORIDE BLDV-SCNC: 107 MMOL/L — SIGNIFICANT CHANGE UP (ref 96–108)
CHLORIDE SERPL-SCNC: 100 MMOL/L — SIGNIFICANT CHANGE UP (ref 96–108)
CHLORIDE SERPL-SCNC: 100 MMOL/L — SIGNIFICANT CHANGE UP (ref 96–108)
CO2 BLDV-SCNC: 26 MMOL/L — SIGNIFICANT CHANGE UP (ref 22–30)
CO2 SERPL-SCNC: 22 MMOL/L — SIGNIFICANT CHANGE UP (ref 22–31)
CO2 SERPL-SCNC: 24 MMOL/L — SIGNIFICANT CHANGE UP (ref 22–31)
CREAT SERPL-MCNC: 2.14 MG/DL — HIGH (ref 0.5–1.3)
CREAT SERPL-MCNC: 2.27 MG/DL — HIGH (ref 0.5–1.3)
DACRYOCYTES BLD QL SMEAR: SLIGHT — SIGNIFICANT CHANGE UP
ELLIPTOCYTES BLD QL SMEAR: SLIGHT — SIGNIFICANT CHANGE UP
EOSINOPHIL # BLD AUTO: 0 K/UL — SIGNIFICANT CHANGE UP (ref 0–0.5)
GAS PNL BLDV: 131 MMOL/L — LOW (ref 136–145)
GAS PNL BLDV: SIGNIFICANT CHANGE UP
GAS PNL BLDV: SIGNIFICANT CHANGE UP
GLUCOSE BLDV-MCNC: 137 MG/DL — HIGH (ref 70–99)
GLUCOSE SERPL-MCNC: 125 MG/DL — HIGH (ref 70–99)
GLUCOSE SERPL-MCNC: 142 MG/DL — HIGH (ref 70–99)
HCO3 BLDV-SCNC: 25 MMOL/L — SIGNIFICANT CHANGE UP (ref 21–29)
HCT VFR BLD CALC: 38.5 % — LOW (ref 39–50)
HCT VFR BLDA CALC: 36 % — LOW (ref 39–50)
HGB BLD CALC-MCNC: 11.7 G/DL — LOW (ref 13–17)
HGB BLD-MCNC: 13 G/DL — SIGNIFICANT CHANGE UP (ref 13–17)
HYPOCHROMIA BLD QL: SLIGHT — SIGNIFICANT CHANGE UP
INR BLD: 1.39 RATIO — HIGH (ref 0.88–1.16)
LACTATE BLDV-MCNC: 1.6 MMOL/L — SIGNIFICANT CHANGE UP (ref 0.7–2)
LYMPHOCYTES # BLD AUTO: 1.1 K/UL — SIGNIFICANT CHANGE UP (ref 1–3.3)
LYMPHOCYTES # BLD AUTO: 8 % — LOW (ref 13–44)
MACROCYTES BLD QL: SLIGHT — SIGNIFICANT CHANGE UP
MCHC RBC-ENTMCNC: 29.2 PG — SIGNIFICANT CHANGE UP (ref 27–34)
MCHC RBC-ENTMCNC: 33.9 GM/DL — SIGNIFICANT CHANGE UP (ref 32–36)
MCV RBC AUTO: 86.1 FL — SIGNIFICANT CHANGE UP (ref 80–100)
MICROCYTES BLD QL: SLIGHT — SIGNIFICANT CHANGE UP
MONOCYTES # BLD AUTO: 0.3 K/UL — SIGNIFICANT CHANGE UP (ref 0–0.9)
MONOCYTES NFR BLD AUTO: 4 % — SIGNIFICANT CHANGE UP (ref 2–14)
NEUTROPHILS # BLD AUTO: 7.4 K/UL — SIGNIFICANT CHANGE UP (ref 1.8–7.4)
NEUTROPHILS NFR BLD AUTO: 78 % — HIGH (ref 43–77)
NEUTS BAND # BLD: 4 % — SIGNIFICANT CHANGE UP (ref 0–8)
NRBC # BLD: 6 /100 — HIGH (ref 0–0)
OVALOCYTES BLD QL SMEAR: SLIGHT — SIGNIFICANT CHANGE UP
PCO2 BLDV: 33 MMHG — LOW (ref 35–50)
PH BLDV: 7.5 — HIGH (ref 7.35–7.45)
PLAT MORPH BLD: ABNORMAL
PLATELET # BLD AUTO: 165 K/UL — SIGNIFICANT CHANGE UP (ref 150–400)
PO2 BLDV: <50 MMHG — SIGNIFICANT CHANGE UP (ref 25–45)
POLYCHROMASIA BLD QL SMEAR: SLIGHT — SIGNIFICANT CHANGE UP
POTASSIUM BLDV-SCNC: 2.7 MMOL/L — CRITICAL LOW (ref 3.5–5.3)
POTASSIUM SERPL-MCNC: 2.9 MMOL/L — CRITICAL LOW (ref 3.5–5.3)
POTASSIUM SERPL-MCNC: 3 MMOL/L — LOW (ref 3.5–5.3)
POTASSIUM SERPL-SCNC: 2.9 MMOL/L — CRITICAL LOW (ref 3.5–5.3)
POTASSIUM SERPL-SCNC: 3 MMOL/L — LOW (ref 3.5–5.3)
PROMYELOCYTES # FLD: 1 % — HIGH (ref 0–0)
PROT SERPL-MCNC: 5.3 G/DL — LOW (ref 6–8.3)
PROT SERPL-MCNC: 5.7 G/DL — LOW (ref 6–8.3)
PROTHROM AB SERPL-ACNC: 16 SEC — HIGH (ref 10–12.9)
RAPID RVP RESULT: SIGNIFICANT CHANGE UP
RBC # BLD: 4.47 M/UL — SIGNIFICANT CHANGE UP (ref 4.2–5.8)
RBC # FLD: 16.3 % — HIGH (ref 10.3–14.5)
RBC BLD AUTO: ABNORMAL
SAO2 % BLDV: 78 % — SIGNIFICANT CHANGE UP (ref 67–88)
SODIUM SERPL-SCNC: 137 MMOL/L — SIGNIFICANT CHANGE UP (ref 135–145)
SODIUM SERPL-SCNC: 139 MMOL/L — SIGNIFICANT CHANGE UP (ref 135–145)
VARIANT LYMPHS # BLD: 5 % — SIGNIFICANT CHANGE UP (ref 0–6)
WBC # BLD: 8.8 K/UL — SIGNIFICANT CHANGE UP (ref 3.8–10.5)
WBC # FLD AUTO: 8.8 K/UL — SIGNIFICANT CHANGE UP (ref 3.8–10.5)

## 2018-11-27 PROCEDURE — 99285 EMERGENCY DEPT VISIT HI MDM: CPT | Mod: 25

## 2018-11-27 PROCEDURE — 99214 OFFICE O/P EST MOD 30 MIN: CPT | Mod: PD

## 2018-11-27 PROCEDURE — 93010 ELECTROCARDIOGRAM REPORT: CPT

## 2018-11-27 PROCEDURE — 71045 X-RAY EXAM CHEST 1 VIEW: CPT | Mod: 26

## 2018-11-27 RX ORDER — SODIUM CHLORIDE 9 MG/ML
1000 INJECTION INTRAMUSCULAR; INTRAVENOUS; SUBCUTANEOUS ONCE
Qty: 0 | Refills: 0 | Status: COMPLETED | OUTPATIENT
Start: 2018-11-27 | End: 2018-11-27

## 2018-11-27 RX ORDER — PREDNISONE 10 MG/1
10 TABLET ORAL
Qty: 14 | Refills: 2 | Status: COMPLETED | COMMUNITY
Start: 2018-08-14 | End: 2018-11-27

## 2018-11-27 RX ORDER — POTASSIUM CHLORIDE 20 MEQ
40 PACKET (EA) ORAL ONCE
Qty: 0 | Refills: 0 | Status: COMPLETED | OUTPATIENT
Start: 2018-11-27 | End: 2018-11-27

## 2018-11-27 RX ORDER — POTASSIUM CHLORIDE 20 MEQ
10 PACKET (EA) ORAL
Qty: 0 | Refills: 0 | Status: COMPLETED | OUTPATIENT
Start: 2018-11-27 | End: 2018-11-28

## 2018-11-27 RX ORDER — PREDNISONE 20 MG/1
20 TABLET ORAL TWICE DAILY
Qty: 60 | Refills: 0 | Status: ACTIVE | COMMUNITY
Start: 2018-10-15 | End: 1900-01-01

## 2018-11-27 RX ORDER — FLUCONAZOLE 50 MG/1
50 TABLET ORAL DAILY
Qty: 15 | Refills: 1 | Status: COMPLETED | COMMUNITY
Start: 2018-10-23 | End: 2018-11-27

## 2018-11-27 RX ORDER — ACETAMINOPHEN 500 MG
1000 TABLET ORAL ONCE
Qty: 0 | Refills: 0 | Status: COMPLETED | OUTPATIENT
Start: 2018-11-27 | End: 2018-11-27

## 2018-11-27 RX ADMIN — Medication 40 MILLIGRAM(S): at 21:39

## 2018-11-27 RX ADMIN — Medication 100 MILLIEQUIVALENT(S): at 23:09

## 2018-11-27 RX ADMIN — Medication 400 MILLIGRAM(S): at 22:45

## 2018-11-27 RX ADMIN — Medication 40 MILLIEQUIVALENT(S): at 23:09

## 2018-11-27 RX ADMIN — SODIUM CHLORIDE 1000 MILLILITER(S): 9 INJECTION INTRAMUSCULAR; INTRAVENOUS; SUBCUTANEOUS at 21:38

## 2018-11-27 NOTE — ASSESSMENT
[Supportive] : Goals of care discussed with patient: Supportive [Palliative Care Plan] : not applicable at this time [FreeTextEntry1] : The patient will remain on prednisone 20 Mg PO BID\par Return to clinic in 2 weeks. If he reduces dose to 20 mg PO daily and diarrhea returns, he qwill have to re institute 20 mg PO daily. \par Pan review of CT/PET next month when study is performed. [FreeTextEntry2] : all questions answered to the patient satisfaction

## 2018-11-27 NOTE — HISTORY OF PRESENT ILLNESS
[Disease: _____________________] : Disease: [unfilled] [T: ___] : T[unfilled] [N: ___] : N[unfilled] [M: ___] : M[unfilled] [AJCC Stage: ____] : AJCC Stage: [unfilled] [Treatment Protocol] : Treatment Protocol [Cardiovascular] : Cardiovascular [Constitutional] : Constitutional [ENT] : ENT [Dermatologic] : Dermatologic [Endocrine] : Endocrine [Gastrointestinal] : Gastrointestinal [Genitourinary] : Genitourinary [Gynecologic] : Gynecologic [Infectious] : Infectious [Musculoskeletal] : Musculoskeletal [Neurologic] : Neurologic [Pain] : Pain [Pulmonary] : Pulmonary [Hematologic] : Hematologic [___________________________________] : Drug: [unfilled] [Date: ____________] : Patient's last distress assessment performed on [unfilled]. [0 - No Distress] : Distress Level: 0 [de-identified] : Rudi Gonsalves is a 87 year old male presenting to the office for evaluation of melanoma. He is referred here by Dr. Karri Ordoñez (surgery). Approximately 2 months ago, patient was hospitalized for a skin graft surgery due to a left leg hematoma. \par The patient has a prior history of skin melanoma, skin angiofibroma (2011), basal cell carcinoma (2013) and  folliculitis in 2015.\par Patient's wife noted a lesion on the top of his right foot, described a "hard blister". He saw Dr. Chance Godoy (dermatologist), who biopsied the lesion on 5/4/2018; results revealed a 1.3 mm melanoma, mitotic index of 6. Due to this finding, another lesion similar in appearance located more proximal on the right leg was biopsied and demonstrated metastatic melanoma. A PET/CT whole body scan was done on 6/4/2018 which noted postsurgical changes on his left leg and small focus over his right ankle but no evidence of disease throughout the remainder of the study. \par Past medical history includes previous history melanoma in 2008 (right chest by Dr. Kalyan Ackerman and 2014 by Dr. Govind Zimmer - surgically resolved each time), prostate cancer (diagnosed in 2001, treated with radiation seed implants by Dr. J. Bosworth). He is also noted to have hypertension, hypercholesteremia, coronary artery disease, atrial fibrillation. Past surgical history includes pacemaker placement, quadruple bypass (2013 at [facility]), left eye cataract surgery (2017). Patient is a former cigarette smoker (quit in 1965), denied illicit drug use.  [de-identified] : malignant melanoma [FreeTextEntry1] :  s/p 2 cycles nivolumab 240 mg, s/p 1 cycle nivolumab 480 mg, s/p 2 cycles of ipilimumab/nivolumab; currently on surveillance [de-identified] : H has been on prednisone 20 mg PO BID. He has non bloody formed stool three times per day [de-identified] : colitis

## 2018-11-27 NOTE — ED PROVIDER NOTE - PROGRESS NOTE DETAILS
Attending MD Mackenzie: Went to reassess patient, found to be sleeping sitting up HR 100s-110s, Sat in high 80s, woke patient up and had him take deep breaths improved to low 90s, placed on 1L NC and improved to 97%, T repeated 102.7 orally, will obtain labs, Cx, CX, Ua, URCx, will be admitting.  Spoke with Dr Bertrand.  Reports that note from Dr. Amor visit today revealed patient HR in clinic 114, Prednisone 20mg BID and return to clinic in 2 weeks was plan.  Will be admitted today. Attending MD Mackenzie: CXR reviewed, ?consolidation, given Ceftriaxone, Cr noted as well, RVP negative, HR improved.  ORdered for Ceftriaxone, noted to have received in Feb 2017.  Spoke with Dr JAMAL Oates, will admit to his service.  UA and UrCx being sent now.

## 2018-11-27 NOTE — ED ADULT NURSE NOTE - NSIMPLEMENTINTERV_GEN_ALL_ED
Implemented All Fall with Harm Risk Interventions:  [place] to call system. Call bell, personal items and telephone within reach. Instruct patient to call for assistance. Room bathroom lighting operational. Non-slip footwear when patient is off stretcher. Physically safe environment: no spills, clutter or unnecessary equipment. Stretcher in lowest position, wheels locked, appropriate side rails in place. Provide visual cue, wrist band, yellow gown, etc. Monitor gait and stability. Monitor for mental status changes and reorient to person, place, and time. Review medications for side effects contributing to fall risk. Reinforce activity limits and safety measures with patient and family. Provide visual clues: red socks.

## 2018-11-27 NOTE — ED PROVIDER NOTE - ATTENDING CONTRIBUTION TO CARE
Attending MD Mackenzie:   I personally have seen and examined this patient.  Physician assistant note reviewed and agree on plan of care and except where noted.  See below for details.     88M with PMH including AFib on ASA, melanoma on Opvido and Yervoy     TO BE COMPLETED Attending MD Mackenzie:   I personally have seen and examined this patient.  Physician assistant note reviewed and agree on plan of care and except where noted.  See below for details.     88M with PMH including AFib on ASA, melanoma on Opvido and Yervoy presents to the ED with diarrhea.  Reports that he has had diarrhea for months because of the medications that he is on and had been on a course of Prednisone 60mg which he finished yesterday.  Reports last infusion was about a month ago.  Reports had about 3-4 episodes of diarrhea today.  Reports today after developed diarrhea went to see Dr. Amor who sent an Rx for Prednisone to the pharmacy but did not have an opportunity to pick it up before having another episode of diarrhea.  Reports diarrhea is "messy" and his wife told him to come in.  Denies abdominal pain, blood in stools.  Denies fevers, chills.  Denies nausea, vomiting. Denies chest pain, shortness of breath, palpitations. Reports not on recent antibiotics.  Reports allergies to Cipro, Clinda and Levaquin (gets diarrhea).  Reports social EtOH, quit tobacco in 1948 and denies drugs.  PSH includes quadruple bypass, pacemaker and L meniscus surgery.  Reports is scheduled for PET scan on 12/13/18.  On exam, NAD, head NCAT, PERRL, FROM at neck, no tenderness to midline palpation, no stepoffs along length of spine, lungs CTAB with good inspiratory effort, +S1S2, irregular, no m/r/g, abdomen soft with +BS, NT, ND, no CVAT, moving all extremities with 5/5 strength bilateral upper and lower extremities, good and equal  strength bilaterally; A/P: 88M with acute on chronic diarrhea suspected from medication side effect, will obtain labs, give IVFs, reassess.  Patient eager to go home. Attending MD Mackenzie:   I personally have seen and examined this patient.  Physician assistant note reviewed and agree on plan of care and except where noted.  See below for details.     88M with PMH including AFib on ASA, MM on Opvido and Yervoy presents to the ED with diarrhea.  Reports that he has had diarrhea for months because of the medications that he is on and had been on a course of Prednisone 60mg which he finished yesterday.  Reports last infusion was about a month ago.  Reports had about 3-4 episodes of diarrhea today.  Reports today after developed diarrhea went to see Dr. Amor who sent an Rx for Prednisone to the pharmacy but did not have an opportunity to pick it up before having another episode of diarrhea.  Reports diarrhea is "messy" and his wife told him to come in.  Denies abdominal pain, blood in stools.  Denies fevers, chills.  Denies nausea, vomiting. Denies chest pain, shortness of breath, palpitations. Reports not on recent antibiotics.  Reports allergies to Cipro, Clinda and Levaquin (gets diarrhea).  Reports social EtOH, quit tobacco in 1948 and denies drugs.  PSH includes quadruple bypass, pacemaker and L meniscus surgery.  Reports is scheduled for PET scan on 12/13/18.  On exam, NAD, head NCAT, PERRL, FROM at neck, no tenderness to midline palpation, no stepoffs along length of spine, lungs CTAB with good inspiratory effort, +S1S2, irregular, no m/r/g, abdomen soft with +BS, NT, ND, no CVAT, moving all extremities with 5/5 strength bilateral upper and lower extremities, good and equal  strength bilaterally; A/P: 88M with acute on chronic diarrhea suspected from medication side effect, will obtain labs, give IVFs, reassess.  Patient eager to go home.

## 2018-11-27 NOTE — ED PROVIDER NOTE - OBJECTIVE STATEMENT
88yom pmhx HTN HLD DM CAD s/p CABG afib on xarelto hx of Mutliple Myeloma here for persistent diarrhea. pt reports month of diarrhea about 3-4 episodes a day while on infusion about 3 weeks ago started on prednisone for the diarrhea which was completed yesterday and today pt had "few more episodes than my usual" and "my wife got concerned and told me to come in". pt was seen earlier by Dr. Amor and rx was sent to the pharmacy but pt never picked it up. No fever or chills. no abd pain, no vomiting. no bloody stools. pt not concerned

## 2018-11-27 NOTE — ED ADULT NURSE NOTE - OBJECTIVE STATEMENT
pt is an 88 yr M presents with 6 episodes of diarrhea today with increased weakness. hx of multiple myeloma on prednisone for diarrhea, prescription ended yesterday. denies abd pain/blood in stool/recent antibiotics/fever/chills.

## 2018-11-28 DIAGNOSIS — R09.89 OTHER SPECIFIED SYMPTOMS AND SIGNS INVOLVING THE CIRCULATORY AND RESPIRATORY SYSTEMS: ICD-10-CM

## 2018-11-28 DIAGNOSIS — R09.02 HYPOXEMIA: ICD-10-CM

## 2018-11-28 DIAGNOSIS — Z29.9 ENCOUNTER FOR PROPHYLACTIC MEASURES, UNSPECIFIED: ICD-10-CM

## 2018-11-28 DIAGNOSIS — E87.6 HYPOKALEMIA: ICD-10-CM

## 2018-11-28 DIAGNOSIS — Z95.1 PRESENCE OF AORTOCORONARY BYPASS GRAFT: Chronic | ICD-10-CM

## 2018-11-28 DIAGNOSIS — R19.7 DIARRHEA, UNSPECIFIED: ICD-10-CM

## 2018-11-28 DIAGNOSIS — I48.91 UNSPECIFIED ATRIAL FIBRILLATION: ICD-10-CM

## 2018-11-28 DIAGNOSIS — S32.040A WEDGE COMPRESSION FRACTURE OF FOURTH LUMBAR VERTEBRA, INITIAL ENCOUNTER FOR CLOSED FRACTURE: ICD-10-CM

## 2018-11-28 DIAGNOSIS — A41.9 SEPSIS, UNSPECIFIED ORGANISM: ICD-10-CM

## 2018-11-28 DIAGNOSIS — R50.9 FEVER, UNSPECIFIED: ICD-10-CM

## 2018-11-28 DIAGNOSIS — I25.10 ATHEROSCLEROTIC HEART DISEASE OF NATIVE CORONARY ARTERY WITHOUT ANGINA PECTORIS: ICD-10-CM

## 2018-11-28 DIAGNOSIS — C43.9 MALIGNANT MELANOMA OF SKIN, UNSPECIFIED: ICD-10-CM

## 2018-11-28 DIAGNOSIS — I10 ESSENTIAL (PRIMARY) HYPERTENSION: ICD-10-CM

## 2018-11-28 LAB
ALBUMIN SERPL ELPH-MCNC: 2.4 G/DL — LOW (ref 3.3–5)
ALP SERPL-CCNC: 81 U/L — SIGNIFICANT CHANGE UP (ref 40–120)
ALT FLD-CCNC: 25 U/L — SIGNIFICANT CHANGE UP (ref 10–45)
ANION GAP SERPL CALC-SCNC: 14 MMOL/L — SIGNIFICANT CHANGE UP (ref 5–17)
APPEARANCE UR: CLEAR — SIGNIFICANT CHANGE UP
AST SERPL-CCNC: 22 U/L — SIGNIFICANT CHANGE UP (ref 10–40)
BACTERIA # UR AUTO: NEGATIVE — SIGNIFICANT CHANGE UP
BASOPHILS # BLD AUTO: 0 K/UL — SIGNIFICANT CHANGE UP (ref 0–0.2)
BASOPHILS NFR BLD AUTO: 0 % — SIGNIFICANT CHANGE UP (ref 0–2)
BILIRUB SERPL-MCNC: 0.2 MG/DL — SIGNIFICANT CHANGE UP (ref 0.2–1.2)
BILIRUB UR-MCNC: NEGATIVE — SIGNIFICANT CHANGE UP
BUN SERPL-MCNC: 36 MG/DL — HIGH (ref 7–23)
C DIFF GDH STL QL: NEGATIVE — SIGNIFICANT CHANGE UP
C DIFF GDH STL QL: SIGNIFICANT CHANGE UP
CALCIUM SERPL-MCNC: 7.4 MG/DL — LOW (ref 8.4–10.5)
CHLORIDE SERPL-SCNC: 103 MMOL/L — SIGNIFICANT CHANGE UP (ref 96–108)
CO2 SERPL-SCNC: 21 MMOL/L — LOW (ref 22–31)
COLOR SPEC: SIGNIFICANT CHANGE UP
CREAT SERPL-MCNC: 1.94 MG/DL — HIGH (ref 0.5–1.3)
CULTURE RESULTS: SIGNIFICANT CHANGE UP
DIFF PNL FLD: ABNORMAL
EOSINOPHIL # BLD AUTO: 0 K/UL — SIGNIFICANT CHANGE UP (ref 0–0.5)
EOSINOPHIL NFR BLD AUTO: 0 % — SIGNIFICANT CHANGE UP (ref 0–6)
EPI CELLS # UR: 1 /HPF — SIGNIFICANT CHANGE UP
GLUCOSE SERPL-MCNC: 208 MG/DL — HIGH (ref 70–99)
GLUCOSE UR QL: ABNORMAL
HCT VFR BLD CALC: 33.2 % — LOW (ref 39–50)
HGB BLD-MCNC: 10.8 G/DL — LOW (ref 13–17)
HYALINE CASTS # UR AUTO: 2 /LPF — SIGNIFICANT CHANGE UP (ref 0–2)
KETONES UR-MCNC: SIGNIFICANT CHANGE UP
LEUKOCYTE ESTERASE UR-ACNC: NEGATIVE — SIGNIFICANT CHANGE UP
LYMPHOCYTES # BLD AUTO: 0.29 K/UL — LOW (ref 1–3.3)
LYMPHOCYTES # BLD AUTO: 4 % — LOW (ref 13–44)
MCHC RBC-ENTMCNC: 28.9 PG — SIGNIFICANT CHANGE UP (ref 27–34)
MCHC RBC-ENTMCNC: 32.5 GM/DL — SIGNIFICANT CHANGE UP (ref 32–36)
MCV RBC AUTO: 88.8 FL — SIGNIFICANT CHANGE UP (ref 80–100)
MONOCYTES # BLD AUTO: 0.15 K/UL — SIGNIFICANT CHANGE UP (ref 0–0.9)
MONOCYTES NFR BLD AUTO: 2 % — SIGNIFICANT CHANGE UP (ref 2–14)
NEUTROPHILS # BLD AUTO: 6.55 K/UL — SIGNIFICANT CHANGE UP (ref 1.8–7.4)
NEUTROPHILS NFR BLD AUTO: 87 % — HIGH (ref 43–77)
NITRITE UR-MCNC: NEGATIVE — SIGNIFICANT CHANGE UP
PH UR: 6 — SIGNIFICANT CHANGE UP (ref 5–8)
PLATELET # BLD AUTO: 167 K/UL — SIGNIFICANT CHANGE UP (ref 150–400)
POTASSIUM SERPL-MCNC: 3.9 MMOL/L — SIGNIFICANT CHANGE UP (ref 3.5–5.3)
POTASSIUM SERPL-SCNC: 3.9 MMOL/L — SIGNIFICANT CHANGE UP (ref 3.5–5.3)
PROT SERPL-MCNC: 5 G/DL — LOW (ref 6–8.3)
PROT UR-MCNC: ABNORMAL
RBC # BLD: 3.74 M/UL — LOW (ref 4.2–5.8)
RBC # FLD: 17.1 % — HIGH (ref 10.3–14.5)
RBC CASTS # UR COMP ASSIST: 1 /HPF — SIGNIFICANT CHANGE UP (ref 0–4)
SODIUM SERPL-SCNC: 138 MMOL/L — SIGNIFICANT CHANGE UP (ref 135–145)
SP GR SPEC: 1.02 — SIGNIFICANT CHANGE UP (ref 1.01–1.02)
SPECIMEN SOURCE: SIGNIFICANT CHANGE UP
UROBILINOGEN FLD QL: NEGATIVE — SIGNIFICANT CHANGE UP
WBC # BLD: 7.36 K/UL — SIGNIFICANT CHANGE UP (ref 3.8–10.5)
WBC # FLD AUTO: 7.36 K/UL — SIGNIFICANT CHANGE UP (ref 3.8–10.5)
WBC UR QL: 2 /HPF — SIGNIFICANT CHANGE UP (ref 0–5)

## 2018-11-28 PROCEDURE — 12345: CPT | Mod: NC

## 2018-11-28 PROCEDURE — 99223 1ST HOSP IP/OBS HIGH 75: CPT

## 2018-11-28 PROCEDURE — 74176 CT ABD & PELVIS W/O CONTRAST: CPT | Mod: 26

## 2018-11-28 RX ORDER — APIXABAN 2.5 MG/1
1 TABLET, FILM COATED ORAL
Qty: 0 | Refills: 0 | COMMUNITY

## 2018-11-28 RX ORDER — ACETAMINOPHEN 500 MG
650 TABLET ORAL EVERY 6 HOURS
Qty: 0 | Refills: 0 | Status: DISCONTINUED | OUTPATIENT
Start: 2018-11-28 | End: 2018-12-06

## 2018-11-28 RX ORDER — CEFTRIAXONE 500 MG/1
1 INJECTION, POWDER, FOR SOLUTION INTRAMUSCULAR; INTRAVENOUS ONCE
Qty: 0 | Refills: 0 | Status: COMPLETED | OUTPATIENT
Start: 2018-11-28 | End: 2018-11-28

## 2018-11-28 RX ORDER — SODIUM CHLORIDE 9 MG/ML
1000 INJECTION, SOLUTION INTRAVENOUS
Qty: 0 | Refills: 0 | Status: DISCONTINUED | OUTPATIENT
Start: 2018-11-28 | End: 2018-12-06

## 2018-11-28 RX ORDER — APIXABAN 2.5 MG/1
2.5 TABLET, FILM COATED ORAL EVERY 12 HOURS
Qty: 0 | Refills: 0 | Status: DISCONTINUED | OUTPATIENT
Start: 2018-11-28 | End: 2018-12-03

## 2018-11-28 RX ORDER — AMLODIPINE BESYLATE 2.5 MG/1
5 TABLET ORAL DAILY
Qty: 0 | Refills: 0 | Status: DISCONTINUED | OUTPATIENT
Start: 2018-11-28 | End: 2018-11-30

## 2018-11-28 RX ORDER — SODIUM CHLORIDE 9 MG/ML
1000 INJECTION INTRAMUSCULAR; INTRAVENOUS; SUBCUTANEOUS ONCE
Qty: 0 | Refills: 0 | Status: COMPLETED | OUTPATIENT
Start: 2018-11-28 | End: 2018-11-28

## 2018-11-28 RX ORDER — ATORVASTATIN CALCIUM 80 MG/1
10 TABLET, FILM COATED ORAL AT BEDTIME
Qty: 0 | Refills: 0 | Status: DISCONTINUED | OUTPATIENT
Start: 2018-11-28 | End: 2018-12-06

## 2018-11-28 RX ADMIN — SODIUM CHLORIDE 100 MILLILITER(S): 9 INJECTION, SOLUTION INTRAVENOUS at 21:07

## 2018-11-28 RX ADMIN — Medication 100 MILLIEQUIVALENT(S): at 00:26

## 2018-11-28 RX ADMIN — AMLODIPINE BESYLATE 5 MILLIGRAM(S): 2.5 TABLET ORAL at 05:35

## 2018-11-28 RX ADMIN — Medication 40 MILLIGRAM(S): at 05:35

## 2018-11-28 RX ADMIN — CEFTRIAXONE 100 GRAM(S): 500 INJECTION, POWDER, FOR SOLUTION INTRAMUSCULAR; INTRAVENOUS at 02:29

## 2018-11-28 RX ADMIN — APIXABAN 2.5 MILLIGRAM(S): 2.5 TABLET, FILM COATED ORAL at 17:32

## 2018-11-28 RX ADMIN — ATORVASTATIN CALCIUM 10 MILLIGRAM(S): 80 TABLET, FILM COATED ORAL at 21:07

## 2018-11-28 RX ADMIN — Medication 100 MILLIEQUIVALENT(S): at 01:35

## 2018-11-28 RX ADMIN — APIXABAN 2.5 MILLIGRAM(S): 2.5 TABLET, FILM COATED ORAL at 05:35

## 2018-11-28 RX ADMIN — SODIUM CHLORIDE 100 MILLILITER(S): 9 INJECTION INTRAMUSCULAR; INTRAVENOUS; SUBCUTANEOUS at 05:43

## 2018-11-28 RX ADMIN — Medication 10 MILLIEQUIVALENT(S): at 03:51

## 2018-11-28 RX ADMIN — SODIUM CHLORIDE 100 MILLILITER(S): 9 INJECTION, SOLUTION INTRAVENOUS at 11:52

## 2018-11-28 RX ADMIN — Medication 1000 MILLIGRAM(S): at 03:51

## 2018-11-28 RX ADMIN — SODIUM CHLORIDE 1000 MILLILITER(S): 9 INJECTION INTRAMUSCULAR; INTRAVENOUS; SUBCUTANEOUS at 03:51

## 2018-11-28 NOTE — H&P ADULT - PROBLEM SELECTOR PLAN 2
no localizing symptoms except diarrhea , CXR with small opacity , UA neg for infection, RVP negative , patient is immunocompromised, however non toxic appearing hemodynamically stable , S/p ceftriaxone 1 G , will hold further antibiotics and monitor closely   - f/u blood cultures   - f/u urine cultures   - f/u stool studies   - f/u CXR final report

## 2018-11-28 NOTE — PROGRESS NOTE ADULT - PROBLEM SELECTOR PLAN 4
Incidentally seen on CT abd/pelvis. No focal weakness, no loss of bowel/bladder control, nonfocal neuro exam  - PT evaluation, OP follow up

## 2018-11-28 NOTE — H&P ADULT - ASSESSMENT
88 M w/pmh  HTN HLD DM CAD s/p CABG , s/p PPM for bradycardia, afib on Eliquis , metastatic melanoma  on Opvido and Yervoy,  p/w   diarrhea, febrile 102.

## 2018-11-28 NOTE — H&P ADULT - NSHPPHYSICALEXAM_GEN_ALL_CORE
Vital Signs Last 24 Hrs  T(C): 37.1 (28 Nov 2018 00:39), Max: 39.3 (27 Nov 2018 22:06)  T(F): 98.8 (28 Nov 2018 00:39), Max: 102.7 (27 Nov 2018 22:06)  HR: 84 (28 Nov 2018 00:39) (84 - 109)  BP: 116/57 (28 Nov 2018 00:39) (110/61 - 128/72)  BP(mean): --  RR: 20 (28 Nov 2018 00:39) (18 - 20)  SpO2: 98% (28 Nov 2018 00:39) (91% - 99%) Vital Signs Last 24 Hrs  T(C): 37.1 (28 Nov 2018 00:39), Max: 39.3 (27 Nov 2018 22:06)  T(F): 98.8 (28 Nov 2018 00:39), Max: 102.7 (27 Nov 2018 22:06)  HR: 84 (28 Nov 2018 00:39) (84 - 109)  BP: 116/57 (28 Nov 2018 00:39) (110/61 - 128/72)  BP(mean): --  RR: 20 (28 Nov 2018 00:39) (18 - 20)  SpO2: 98% (28 Nov 2018 00:39) (91% - 99%)    GENERAL: No acute distress, well-developed  HEAD:  Atraumatic, Normocephalic  ENT: EOMI, PERRLA, conjunctiva and sclera clear,  moist mucosa no pharyngeal erythema or exudates   NECK: supple , no JVD   CHEST/LUNG: Clear to auscultation bilaterally; No wheeze, equal breath sounds bilaterally   BACK: No spinal tenderness,  No CVA tenderness   HEART: Regular rate and rhythm; No murmurs, rubs, or gallops  ABDOMEN: Soft, Nontender, Nondistended; Bowel sounds present  EXTREMITIES:  No clubbing, cyanosis, or edema  MSK: No joint swelling or effusions, ROM intact   PSYCH: Normal behavior/affect  NEUROLOGY: AAOx3, non-focal, cranial nerves intact  SKIN: lower extremity w/ trace edema s/p skin grafts b/l pigmented skin

## 2018-11-28 NOTE — ED ADULT NURSE REASSESSMENT NOTE - NS ED NURSE REASSESS COMMENT FT1
Pt returned from Ct scan. Pt declined rectal temp. States she has IBS and is concerned a rectal temp may cause distress.

## 2018-11-28 NOTE — PROGRESS NOTE ADULT - PROBLEM SELECTOR PLAN 1
Febrile to 102.7, tachycardic in setting of diarrhea while on chronic prednisone therapy. Concern for infectious diarrhea vs. noninfectious SIRS syndrome  - pt with allergies/intolerances to pcn, quinolones, prolonged QT on EKG- would empirically cover with ceftriaxone 1gm iv q24 and flagyl 500mg iv q8 after stool studies collected  - f/u stool culture, pcr, c.dif, blood cx, urine culture.  - would continue maintenance ivf LR @ 100cc/hr x 12 hours.

## 2018-11-28 NOTE — H&P ADULT - HISTORY OF PRESENT ILLNESS
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  Patient reports diarrhea consisting of  3-4 episodes of watery stool a day over the past months, symptoms are attributed  to the immunotherapy  and patient has been on prednisone  which improved his symptoms.   However since completed the course of steroids , his diarrhea has worsened.  He reports No fever or chills. no abd pain, no vomiting. no bloody stools. Patient is an 88 year old male with past medical history significant for  HTN HLD DM CAD s/p CABG , s/p PPM for bradycardia, afib on Eliquis , metastatic melanoma s/p excision and skin grafts currently  on Opvido and Yervoy, presents for  diarrhea x one day.  Patient reports diarrhea consisting of  3-4 episodes of watery stool a day over the past month, symptoms were attributed  to the immunotherapy  and patient has been on prednisone  which improved his symptoms.   However his prescription ran out on day prior to admission.  on day of admission he reports an episode of explosive  non bloody diarrhea.  He reports No fever or chills. no abd pain, no vomiting. no bloody stools.

## 2018-11-28 NOTE — H&P ADULT - NSHPLABSRESULTS_GEN_ALL_CORE
Labs personally reviewed:                          13.0   8.8   )-----------( 165      ( 2018 21:46 )             38.5         137  |  100  |  38<H>  ----------------------------<  142<H>  2.9<LL>   |  22  |  2.14<H>    Ca    7.8<L>      2018 22:39  Phos  2.2       Mg     1.7         TPro  5.3<L>  /  Alb  2.7<L>  /  TBili  0.3  /  DBili  x   /  AST  19  /  ALT  23  /  AlkPhos  84          LIVER FUNCTIONS - ( 2018 22:39 )  Alb: 2.7 g/dL / Pro: 5.3 g/dL / ALK PHOS: 84 U/L / ALT: 23 U/L / AST: 19 U/L / GGT: x           PT/INR - ( 2018 21:46 )   PT: 16.0 sec;   INR: 1.39 ratio         PTT - ( 2018 21:46 )  PTT:25.7 sec  Urinalysis Basic - ( 2018 02:05 )    Color: Light Yellow / Appearance: Clear / S.016 / pH: x  Gluc: x / Ketone: Trace  / Bili: Negative / Urobili: Negative   Blood: x / Protein: 30 mg/dL / Nitrite: Negative   Leuk Esterase: Negative / RBC: 1 /hpf / WBC 2 /hpf   Sq Epi: x / Non Sq Epi: 1 /hpf / Bacteria: Negative    CAPILLARY BLOOD GLUCOSE    Imaging:  CXR personally reviewed: Question a subtle opacity in the left lower hemithorax   which may be due to a summation of shadows versus a true consolidation. Followup official report.      EKG personally reviewed: Atrial sensed ventricular paced , 103 bpm , QTc 550

## 2018-11-28 NOTE — PROGRESS NOTE ADULT - PROBLEM SELECTOR PLAN 2
thought to be secondary to immunotherapy , however was febrile raising concern for infectious diarrhea  - continue prednisone 40mg po daily   - empiric ceftriaxone and flagyl as above  - f/u stool culture, pcr, cdif

## 2018-11-28 NOTE — CONSULT NOTE ADULT - ASSESSMENT
Patient is an 88 year old male with past medical history significant for HTN HLD DM CAD s/p CABG , s/p PPM for bradycardia, afib on Eliquis, prostate cancer (diagnosed 2001 s/p radiation seed implants by Dr. Bosworth), recurrent melanoma s/p excision and skin grafts who presents with acute on chronic diarrhea.     # Stage III melanoma  - First diagnosedi n 2009 and 2014, resolved with surgical resection  -  Now found to have recurrence in 5/2018 in right foot and right leg consistent with metastatic melanoma  - PET/CT  6/4/2018 no evidence of disease throughout  - Patient is s/p 2 cycles nivolumab 240 mg, s/p 1 cycle nivolumab 480 mg, s/p 2 cycles of ipilimumab/nivolumab; currently on surveillance.   # Immunotherapy induced diarrhea  -  3-4 episodes of watery stool a day over the past month  - Currently on prednisone 20mg bid, continue  - IVF hdyration    Ness Bertrand  PGY-4 Hematology Oncology  [phone] Patient is an 88 year old male with past medical history significant for HTN HLD DM CAD s/p CABG , s/p PPM for bradycardia, afib on Eliquis, prostate cancer (diagnosed 2001 s/p radiation seed implants by Dr. Bosworth), recurrent melanoma s/p excision and skin grafts who presents with acute on chronic diarrhea.     # Stage III melanoma  - First diagnosedi n 2009 and 2014, resolved with surgical resection  -  Now found to have recurrence in 5/2018 in right foot and right leg consistent with metastatic melanoma  - PET/CT  6/4/2018 no evidence of disease throughout  - Patient is s/p 2 cycles nivolumab 240 mg, s/p 1 cycle nivolumab 480 mg, s/p 2 cycles of ipilimumab/nivolumab; currently on surveillance.   # Immunotherapy induced diarrhea  -  3-4 episodes of loose stools a day over the past month  - Currently on prednisone 20mg bid, continue  - IVF hdyration  - C. Diff negative  - CT a/p negative for bowel  obstruction        Ness Bertrand  PGY-4 Hematology Oncology  [phone]

## 2018-11-28 NOTE — H&P ADULT - PROBLEM SELECTOR PLAN 1
thought to be secondary to immunotherapy , however currently febrile   - prednisone   - check c diff panel   - stool PCR

## 2018-11-28 NOTE — PROGRESS NOTE ADULT - SUBJECTIVE AND OBJECTIVE BOX
Hospitalist- Bartolo Oates MD  Pager: [phone]  If no response or off-hours, page [phone]  -------------------------------------  Patient is a 88y old  Male who presents with a chief complaint of diarrhea (28 Nov 2018 10:53)  Subjective: No acute events overnight. Pt reports no more diarrheal episodes since presenting to ED. +chills yesterday, but not any longer. No abd pain, n/v, no sob/cough, no chest pain/palpitations. +lower back pain, chronic.    14 point ros otherwise negative    VITAL SIGNS:  Vital Signs Last 24 Hrs  T(C): 36.4 (28 Nov 2018 12:39), Max: 39.3 (27 Nov 2018 22:06)  T(F): 97.5 (28 Nov 2018 12:39), Max: 102.7 (27 Nov 2018 22:06)  HR: 81 (28 Nov 2018 12:39) (69 - 109)  BP: 115/69 (28 Nov 2018 12:39) (97/58 - 128/72)  BP(mean): --  RR: 18 (28 Nov 2018 12:39) (15 - 20)  SpO2: 98% (28 Nov 2018 12:39) (91% - 99%)      PHYSICAL EXAM:     GENERAL: no acute distress  HEENT: PERRLA, EOMI, moist oropharynx   RESPIRATORY: CTAB, no w/c   CARDIOVASCULAR: RRR, no murmurs, gallops, rubs  ABDOMINAL: soft, non-tender, non-distended, positive bowel sounds   EXTREMITIES: no clubbing, cyanosis, +B/L LE venous stasis  NEUROLOGICAL: alert and oriented x 3, non-focal  SKIN: no rashes or lesions   MUSCULOSKELETAL: no gross joint deformity                          10.8   7.36  )-----------( 167      ( 28 Nov 2018 08:03 )             33.2     11-28    138  |  103  |  36<H>  ----------------------------<  208<H>  3.9   |  21<L>  |  1.94<H>    Ca    7.4<L>      28 Nov 2018 06:00  Phos  2.2     11-27  Mg     1.7     11-27    TPro  5.0<L>  /  Alb  2.4<L>  /  TBili  0.2  /  DBili  x   /  AST  22  /  ALT  25  /  AlkPhos  81  11-28      CAPILLARY BLOOD GLUCOSE          MEDICATIONS  (STANDING):  amLODIPine   Tablet 5 milliGRAM(s) Oral daily  apixaban 2.5 milliGRAM(s) Oral every 12 hours  atorvastatin 10 milliGRAM(s) Oral at bedtime  lactated ringers. 1000 milliLiter(s) (100 mL/Hr) IV Continuous <Continuous>  predniSONE   Tablet 40 milliGRAM(s) Oral daily    MEDICATIONS  (PRN):  acetaminophen   Tablet .. 650 milliGRAM(s) Oral every 6 hours PRN Mild Pain (1 - 3)

## 2018-11-28 NOTE — CONSULT NOTE ADULT - SUBJECTIVE AND OBJECTIVE BOX
HPI:  Patient is an 88 year old male with past medical history significant for HTN HLD DM CAD s/p CABG , s/p PPM for bradycardia, afib on Eliquis, prostate cancer (diagnosed 2001 s/p radiation seed implants by Dr. Bosworth), recurrent melanoma s/p excision and skin grafts who presents with acute on chronic diarrhea.   Patient was first found to have melanoma in 2009. At the time it was in the right chest and was treated by Dr. Kalyan Ackerman. He was found to have localized melanoma again in 2014, treated by by Dr. Govind Zimmer (both surgically resolved each time). In May 2018 his wife noted a lesion on the top of his right foot for which he saw Dr. Chance Godoy (dermatologist). Biopsy of the lesion on 5/4/2018 revealed a 1.3 mm melanoma, mitotic index of 6. Due to this finding, another lesion similar in appearance located more proximal on the right leg was biopsied and demonstrated metastatic melanoma. A PET/CT whole body scan was done on 6/4/2018 which noted postsurgical changes on his left leg and small focus over his right ankle but no evidence of disease throughout the remainder of the study.  Patient is s/p 2 cycles nivolumab 240 mg, s/p 1 cycle nivolumab 480 mg, s/p 2 cycles of ipilimumab/nivolumab; currently on surveillance.   Patient reports diarrhea consisting of  3-4 episodes of watery stool a day over the past month, symptoms were attributed  to the immunotherapy  and patient has been on prednisone 20mg bid which improved his symptoms.      Allergies  ciprofloxacin (Unknown)  Levaquin (Unknown)  penicillin (Unknown)    Intolerances        MEDICATIONS  (STANDING):  amLODIPine   Tablet 5 milliGRAM(s) Oral daily  apixaban 2.5 milliGRAM(s) Oral every 12 hours  atorvastatin 10 milliGRAM(s) Oral at bedtime  lactated ringers. 1000 milliLiter(s) (100 mL/Hr) IV Continuous <Continuous>  predniSONE   Tablet 40 milliGRAM(s) Oral daily    MEDICATIONS  (PRN):  acetaminophen   Tablet .. 650 milliGRAM(s) Oral every 6 hours PRN Mild Pain (1 - 3)      PAST MEDICAL & SURGICAL HISTORY:  Pacemaker  Melanoma  Atrial fibrillation  CAD (coronary artery disease)  DM (diabetes mellitus)  HTN (hypertension)  S/P CABG x 4      FAMILY HISTORY:  Family history of chronic ischemic heart disease (Father)      SOCIAL HISTORY: No EtOH, no tobacco    REVIEW OF SYSTEMS: per HPI      T(F): 97.6 (11-28-18 @ 10:00), Max: 102.7 (11-27-18 @ 22:06)  HR: 79 (11-28-18 @ 10:00)  BP: 119/61 (11-28-18 @ 10:00)  RR: 16 (11-28-18 @ 10:00)  SpO2: 95% (11-28-18 @ 10:00)  Wt(kg): --    GENERAL: NAD, well-developed  HEAD:  Atraumatic, Normocephalic  EYES: EOMI, PERRLA, conjunctiva and sclera clear  NECK: Supple, No JVD  CHEST/LUNG: Clear to auscultation bilaterally; No wheeze  HEART: Regular rate and rhythm; No murmurs, rubs, or gallops  ABDOMEN: Soft, Nontender, Nondistended; Bowel sounds present  EXTREMITIES:  2+ Peripheral Pulses, No clubbing, cyanosis, or edema  NEUROLOGY: non-focal  SKIN: No rashes or lesions                          10.8   7.36  )-----------( 167      ( 28 Nov 2018 08:03 )             33.2       11-28    138  |  103  |  36<H>  ----------------------------<  208<H>  3.9   |  21<L>  |  1.94<H>    Ca    7.4<L>      28 Nov 2018 06:00  Phos  2.2     11-27  Mg     1.7     11-27    TPro  5.0<L>  /  Alb  2.4<L>  /  TBili  0.2  /  DBili  x   /  AST  22  /  ALT  25  /  AlkPhos  81  11-28      Magnesium, Serum: 1.7 mg/dL (11-27 @ 22:39)  Phosphorus Level, Serum: 2.2 mg/dL (11-27 @ 22:39) HPI:  Patient is an 88 year old male with past medical history significant for HTN HLD DM CAD s/p CABG , s/p PPM for bradycardia, afib on Eliquis, prostate cancer (diagnosed 2001 s/p radiation seed implants by Dr. Bosworth), recurrent melanoma s/p excision and skin grafts who presents with acute on chronic diarrhea.   Patient was first found to have melanoma in 2009. At the time it was in the right chest and was treated by Dr. Kalyan Ackerman. He was found to have localized melanoma again in 2014, treated by by Dr. Govind Zimmer (both surgically resolved each time). In May 2018 his wife noted a lesion on the top of his right foot for which he saw Dr. Chance Godoy (dermatologist). Biopsy of the lesion on 5/4/2018 revealed a 1.3 mm melanoma, mitotic index of 6. Due to this finding, another lesion similar in appearance located more proximal on the right leg was biopsied and demonstrated metastatic melanoma. A PET/CT whole body scan was done on 6/4/2018 which noted postsurgical changes on his left leg and small focus over his right ankle but no evidence of disease throughout the remainder of the study.  Patient is s/p 2 cycles nivolumab 240 mg, s/p 1 cycle nivolumab 480 mg, s/p 2 cycles of ipilimumab/nivolumab; currently on surveillance.   Patient reports diarrhea consisting of  3-4 episodes of loose stool a day over the past month, symptoms were attributed  to the immunotherapy  and patient has been on prednisone 20mg bid which improved his symptoms.  Yesterday, he ran out of prednisone and had several episodes of watery diarrhea where he could not make it to the restroom for which he decided to come in. Denies fever, nausea, vomiting, abdominal pain.     Allergies  ciprofloxacin (Unknown)  Levaquin (Unknown)  penicillin (Unknown)    Intolerances        MEDICATIONS  (STANDING):  amLODIPine   Tablet 5 milliGRAM(s) Oral daily  apixaban 2.5 milliGRAM(s) Oral every 12 hours  atorvastatin 10 milliGRAM(s) Oral at bedtime  lactated ringers. 1000 milliLiter(s) (100 mL/Hr) IV Continuous <Continuous>  predniSONE   Tablet 40 milliGRAM(s) Oral daily    MEDICATIONS  (PRN):  acetaminophen   Tablet .. 650 milliGRAM(s) Oral every 6 hours PRN Mild Pain (1 - 3)      PAST MEDICAL & SURGICAL HISTORY:  Pacemaker  Melanoma  Atrial fibrillation  CAD (coronary artery disease)  DM (diabetes mellitus)  HTN (hypertension)  S/P CABG x 4      FAMILY HISTORY:  Family history of chronic ischemic heart disease (Father)      SOCIAL HISTORY: No EtOH, no tobacco    REVIEW OF SYSTEMS: per HPI      T(F): 97.6 (11-28-18 @ 10:00), Max: 102.7 (11-27-18 @ 22:06)  HR: 79 (11-28-18 @ 10:00)  BP: 119/61 (11-28-18 @ 10:00)  RR: 16 (11-28-18 @ 10:00)  SpO2: 95% (11-28-18 @ 10:00)  Wt(kg): --    GENERAL: NAD, well-developed  HEAD:  Atraumatic, Normocephalic  EYES: EOMI, PERRLA, conjunctiva and sclera clear  NECK: Supple  CHEST/LUNG: Clear to auscultation bilaterally; No wheeze  HEART: Regular rate and rhythm; No murmurs, rubs, or gallops  ABDOMEN: Soft, Nontender, Nondistended; Bowel sounds present  EXTREMITIES:   No clubbing, cyanosis, or edema  NEUROLOGY: non-focal  SKIN: No rashes or lesions                          10.8   7.36  )-----------( 167      ( 28 Nov 2018 08:03 )             33.2       11-28    138  |  103  |  36<H>  ----------------------------<  208<H>  3.9   |  21<L>  |  1.94<H>    Ca    7.4<L>      28 Nov 2018 06:00  Phos  2.2     11-27  Mg     1.7     11-27    TPro  5.0<L>  /  Alb  2.4<L>  /  TBili  0.2  /  DBili  x   /  AST  22  /  ALT  25  /  AlkPhos  81  11-28      Magnesium, Serum: 1.7 mg/dL (11-27 @ 22:39)  Phosphorus Level, Serum: 2.2 mg/dL (11-27 @ 22:39)

## 2018-11-28 NOTE — H&P ADULT - PMH
Atrial fibrillation    CAD (coronary artery disease)    DM (diabetes mellitus)    HTN (hypertension)    Multiple myeloma Atrial fibrillation    CAD (coronary artery disease)    DM (diabetes mellitus)    HTN (hypertension)    Melanoma    Pacemaker

## 2018-11-28 NOTE — H&P ADULT - NSHPREVIEWOFSYSTEMS_GEN_ALL_CORE
CONSTITUTIONAL: No weakness, fevers or chills  EYES/ENT: No visual changes;  No dysphagia  NECK: No pain or stiffness  RESPIRATORY: No cough, wheezing, hemoptysis; No shortness of breath  CARDIOVASCULAR: No chest pain or palpitations; No lower extremity edema  EXTREMITIES: no le edema, cyanosis, clubbing  GASTROINTESTINAL: No abdominal or epigastric pain. No nausea, vomiting, or hematemesis; + diarrhea  no constipation. No melena or hematochezia.  BACK: No back pain  GENITOURINARY: No dysuria, frequency or hematuria  NEUROLOGICAL: No numbness or weakness  MSK: no joint swelling or pain  SKIN: No itching, burning, rashes, or lesions   PSYCH: no agitation  All other review of systems is negative unless indicated above.

## 2018-11-29 ENCOUNTER — TRANSCRIPTION ENCOUNTER (OUTPATIENT)
Age: 83
End: 2018-11-29

## 2018-11-29 DIAGNOSIS — N17.9 ACUTE KIDNEY FAILURE, UNSPECIFIED: ICD-10-CM

## 2018-11-29 LAB
-  CANDIDA ALBICANS: SIGNIFICANT CHANGE UP
-  CANDIDA GLABRATA: SIGNIFICANT CHANGE UP
-  CANDIDA KRUSEI: SIGNIFICANT CHANGE UP
-  CANDIDA PARAPSILOSIS: SIGNIFICANT CHANGE UP
-  CANDIDA TROPICALIS: SIGNIFICANT CHANGE UP
-  COAGULASE NEGATIVE STAPHYLOCOCCUS: SIGNIFICANT CHANGE UP
-  K. PNEUMONIAE GROUP: SIGNIFICANT CHANGE UP
-  KPC RESISTANCE GENE: SIGNIFICANT CHANGE UP
-  STREPTOCOCCUS SP. (NOT GRP A, B OR S PNEUMONIAE): SIGNIFICANT CHANGE UP
A BAUMANNII DNA SPEC QL NAA+PROBE: SIGNIFICANT CHANGE UP
ALBUMIN SERPL ELPH-MCNC: 2.4 G/DL — LOW (ref 3.3–5)
ALP SERPL-CCNC: 80 U/L — SIGNIFICANT CHANGE UP (ref 40–120)
ALT FLD-CCNC: 27 U/L — SIGNIFICANT CHANGE UP (ref 10–45)
ANION GAP SERPL CALC-SCNC: 14 MMOL/L — SIGNIFICANT CHANGE UP (ref 5–17)
AST SERPL-CCNC: 18 U/L — SIGNIFICANT CHANGE UP (ref 10–40)
BASOPHILS # BLD AUTO: 0.03 K/UL — SIGNIFICANT CHANGE UP (ref 0–0.2)
BASOPHILS NFR BLD AUTO: 0.4 % — SIGNIFICANT CHANGE UP (ref 0–2)
BILIRUB SERPL-MCNC: 0.3 MG/DL — SIGNIFICANT CHANGE UP (ref 0.2–1.2)
BUN SERPL-MCNC: 29 MG/DL — HIGH (ref 7–23)
CALCIUM SERPL-MCNC: 8.2 MG/DL — LOW (ref 8.4–10.5)
CHLORIDE SERPL-SCNC: 106 MMOL/L — SIGNIFICANT CHANGE UP (ref 96–108)
CO2 SERPL-SCNC: 20 MMOL/L — LOW (ref 22–31)
CREAT SERPL-MCNC: 1.5 MG/DL — HIGH (ref 0.5–1.3)
CULTURE RESULTS: SIGNIFICANT CHANGE UP
E CLOAC COMP DNA BLD POS QL NAA+PROBE: SIGNIFICANT CHANGE UP
E COLI DNA BLD POS QL NAA+NON-PROBE: SIGNIFICANT CHANGE UP
ENTEROCOC DNA BLD POS QL NAA+NON-PROBE: SIGNIFICANT CHANGE UP
ENTEROCOC DNA BLD POS QL NAA+NON-PROBE: SIGNIFICANT CHANGE UP
EOSINOPHIL # BLD AUTO: 0.02 K/UL — SIGNIFICANT CHANGE UP (ref 0–0.5)
EOSINOPHIL NFR BLD AUTO: 0.3 % — SIGNIFICANT CHANGE UP (ref 0–6)
GLUCOSE SERPL-MCNC: 129 MG/DL — HIGH (ref 70–99)
GP B STREP DNA BLD POS QL NAA+NON-PROBE: SIGNIFICANT CHANGE UP
GRAM STN FLD: SIGNIFICANT CHANGE UP
GRAM STN FLD: SIGNIFICANT CHANGE UP
HAEM INFLU DNA BLD POS QL NAA+NON-PROBE: SIGNIFICANT CHANGE UP
HCT VFR BLD CALC: 35 % — LOW (ref 39–50)
HGB BLD-MCNC: 11.5 G/DL — LOW (ref 13–17)
IMM GRANULOCYTES NFR BLD AUTO: 2.8 % — HIGH (ref 0–1.5)
K OXYTOCA DNA BLD POS QL NAA+NON-PROBE: SIGNIFICANT CHANGE UP
L MONOCYTOG DNA BLD POS QL NAA+NON-PROBE: SIGNIFICANT CHANGE UP
LYMPHOCYTES # BLD AUTO: 1.08 K/UL — SIGNIFICANT CHANGE UP (ref 1–3.3)
LYMPHOCYTES # BLD AUTO: 15.1 % — SIGNIFICANT CHANGE UP (ref 13–44)
MAGNESIUM SERPL-MCNC: 2.1 MG/DL — SIGNIFICANT CHANGE UP (ref 1.6–2.6)
MCHC RBC-ENTMCNC: 28.5 PG — SIGNIFICANT CHANGE UP (ref 27–34)
MCHC RBC-ENTMCNC: 32.9 GM/DL — SIGNIFICANT CHANGE UP (ref 32–36)
MCV RBC AUTO: 86.8 FL — SIGNIFICANT CHANGE UP (ref 80–100)
METHOD TYPE: SIGNIFICANT CHANGE UP
MONOCYTES # BLD AUTO: 0.83 K/UL — SIGNIFICANT CHANGE UP (ref 0–0.9)
MONOCYTES NFR BLD AUTO: 11.6 % — SIGNIFICANT CHANGE UP (ref 2–14)
MRSA SPEC QL CULT: SIGNIFICANT CHANGE UP
MSSA DNA SPEC QL NAA+PROBE: SIGNIFICANT CHANGE UP
N MEN ISLT CULT: SIGNIFICANT CHANGE UP
NEUTROPHILS # BLD AUTO: 4.98 K/UL — SIGNIFICANT CHANGE UP (ref 1.8–7.4)
NEUTROPHILS NFR BLD AUTO: 69.8 % — SIGNIFICANT CHANGE UP (ref 43–77)
P AERUGINOSA DNA BLD POS NAA+NON-PROBE: SIGNIFICANT CHANGE UP
PHOSPHATE SERPL-MCNC: 2.3 MG/DL — LOW (ref 2.5–4.5)
PLATELET # BLD AUTO: 167 K/UL — SIGNIFICANT CHANGE UP (ref 150–400)
POTASSIUM SERPL-MCNC: 3.3 MMOL/L — LOW (ref 3.5–5.3)
POTASSIUM SERPL-SCNC: 3.3 MMOL/L — LOW (ref 3.5–5.3)
PROT SERPL-MCNC: 5.1 G/DL — LOW (ref 6–8.3)
RBC # BLD: 4.03 M/UL — LOW (ref 4.2–5.8)
RBC # FLD: 16.8 % — HIGH (ref 10.3–14.5)
S MARCESCENS DNA BLD POS NAA+NON-PROBE: SIGNIFICANT CHANGE UP
S PNEUM DNA BLD POS QL NAA+NON-PROBE: SIGNIFICANT CHANGE UP
S PYO DNA BLD POS QL NAA+NON-PROBE: SIGNIFICANT CHANGE UP
SODIUM SERPL-SCNC: 140 MMOL/L — SIGNIFICANT CHANGE UP (ref 135–145)
SPECIMEN SOURCE: SIGNIFICANT CHANGE UP
WBC # BLD: 7.14 K/UL — SIGNIFICANT CHANGE UP (ref 3.8–10.5)
WBC # FLD AUTO: 7.14 K/UL — SIGNIFICANT CHANGE UP (ref 3.8–10.5)

## 2018-11-29 PROCEDURE — 99233 SBSQ HOSP IP/OBS HIGH 50: CPT

## 2018-11-29 PROCEDURE — 99232 SBSQ HOSP IP/OBS MODERATE 35: CPT

## 2018-11-29 RX ORDER — MEROPENEM 1 G/30ML
INJECTION INTRAVENOUS
Qty: 0 | Refills: 0 | Status: DISCONTINUED | OUTPATIENT
Start: 2018-11-29 | End: 2018-11-29

## 2018-11-29 RX ORDER — MEROPENEM 1 G/30ML
INJECTION INTRAVENOUS
Qty: 0 | Refills: 0 | Status: DISCONTINUED | OUTPATIENT
Start: 2018-11-29 | End: 2018-12-06

## 2018-11-29 RX ORDER — POTASSIUM CHLORIDE 20 MEQ
40 PACKET (EA) ORAL ONCE
Qty: 0 | Refills: 0 | Status: COMPLETED | OUTPATIENT
Start: 2018-11-29 | End: 2018-11-29

## 2018-11-29 RX ORDER — MEROPENEM 1 G/30ML
1000 INJECTION INTRAVENOUS EVERY 12 HOURS
Qty: 0 | Refills: 0 | Status: DISCONTINUED | OUTPATIENT
Start: 2018-11-29 | End: 2018-12-06

## 2018-11-29 RX ORDER — MEROPENEM 1 G/30ML
1000 INJECTION INTRAVENOUS ONCE
Qty: 0 | Refills: 0 | Status: COMPLETED | OUTPATIENT
Start: 2018-11-29 | End: 2018-11-29

## 2018-11-29 RX ADMIN — AMLODIPINE BESYLATE 5 MILLIGRAM(S): 2.5 TABLET ORAL at 05:49

## 2018-11-29 RX ADMIN — Medication 40 MILLIGRAM(S): at 05:49

## 2018-11-29 RX ADMIN — APIXABAN 2.5 MILLIGRAM(S): 2.5 TABLET, FILM COATED ORAL at 05:49

## 2018-11-29 RX ADMIN — ATORVASTATIN CALCIUM 10 MILLIGRAM(S): 80 TABLET, FILM COATED ORAL at 22:54

## 2018-11-29 RX ADMIN — APIXABAN 2.5 MILLIGRAM(S): 2.5 TABLET, FILM COATED ORAL at 17:00

## 2018-11-29 RX ADMIN — MEROPENEM 100 MILLIGRAM(S): 1 INJECTION INTRAVENOUS at 22:54

## 2018-11-29 RX ADMIN — MEROPENEM 100 MILLIGRAM(S): 1 INJECTION INTRAVENOUS at 11:29

## 2018-11-29 RX ADMIN — Medication 40 MILLIEQUIVALENT(S): at 12:05

## 2018-11-29 NOTE — PROGRESS NOTE ADULT - PROBLEM SELECTOR PLAN 1
Febrile to 102.7, tachycardic in setting of diarrhea while on chronic prednisone therapy. Initial concern for sepsis 2/2 infectious diarrhea, however now suspect initial sepsis was 2/2 gram negative jose alejandro bacteremia. No longer septic.   - given allergies, start meropenem 1gm iv q12 hours (renally dosed)  - repeat surveillance cultures in 48 hours (11/30/18)  - f/u final org id and sensitivities in both blood and urine  - tylenol prn fever

## 2018-11-29 NOTE — DISCHARGE NOTE ADULT - CARE PLAN
Principal Discharge DX:	Sepsis, due to unspecified organism Principal Discharge DX:	Sepsis, due to unspecified organism  Goal:	resolve infection  Assessment and plan of treatment:	gram negative jose alejandro bacteremia/  Secondary Diagnosis:	JOSE MARIA (acute kidney injury)  Secondary Diagnosis:	Atrial fibrillation  Secondary Diagnosis:	Compression fracture of L4 lumbar vertebra  Secondary Diagnosis:	Hypokalemia  Secondary Diagnosis:	Pancreatic cyst  Secondary Diagnosis:	CAD (coronary artery disease) Principal Discharge DX:	Sepsis, due to unspecified organism  Goal:	resolve infection  Assessment and plan of treatment:	gram negative jose alejandro bacteremia  complete antibiotics  picc line care  home care follow up  Secondary Diagnosis:	JOSE MARIA (acute kidney injury)  Assessment and plan of treatment:	avoid kidney toxic medication  follow up kidney function test  Secondary Diagnosis:	Atrial fibrillation  Assessment and plan of treatment:	continue home medication  Secondary Diagnosis:	Compression fracture of L4 lumbar vertebra  Assessment and plan of treatment:	activity as tolerated  develops new LE weakness, numbness/tingling, loss of sensation, loss of bowel/bladder control. Patient verbalizes understanding.  - continue ambulation as tolerated. Pt states he plays tennis 3 times a week and can navigate stairs without problems.  - no indication for PT.  Secondary Diagnosis:	Hypokalemia  Assessment and plan of treatment:	resolved  Secondary Diagnosis:	Pancreatic cyst  Assessment and plan of treatment:	First seen 8 years ago on EUS with Dr. Eliseo Vieyra was 2x1 cm benign features reported. Stable in size compared to in 6/2018. Discussed with radiology- appears benign. Guidelines recommend follow up contrast CT or MRCP w/wo contrast every 2 years x 4 years  - continue outpatient surveillance.  Secondary Diagnosis:	CAD (coronary artery disease)  Assessment and plan of treatment:	continue medication as prescribed

## 2018-11-29 NOTE — PROGRESS NOTE ADULT - SUBJECTIVE AND OBJECTIVE BOX
Hospitalist- Bartolo Oates MD  Pager: [phone]  If no response or off-hours, page [phone]  -------------------------------------  Patient is a 88y old  Male who presents with a chief complaint of diarrhea x one day (28 Nov 2018 12:51)  Subjective: Pt reports BM x 4 since yesterday. Had been afebrile off antibiotics but noted to have 1 bottle GNR from blood cultures. Denies any present fevers/chills, no cough/sob, no new aches/pains, no cp/palpitations, no dysuria, no pain on defecation or abd pain, no n/v. Ambulated without difficulty to and from bathroom, and around medical floor this morning,. No urinary/bowel inctontinence, no loss of sensation or focal weaknesses.    14 point ros otherwise positive for chronic lower back pain- unchanged from baseline.    VITAL SIGNS:  Vital Signs Last 24 Hrs  T(C): 36.3 (29 Nov 2018 04:43), Max: 36.5 (28 Nov 2018 19:44)  T(F): 97.3 (29 Nov 2018 04:43), Max: 97.7 (28 Nov 2018 19:44)  HR: 74 (29 Nov 2018 04:43) (71 - 81)  BP: 155/80 (29 Nov 2018 04:43) (115/69 - 155/80)  BP(mean): --  RR: 16 (29 Nov 2018 04:43) (16 - 18)  SpO2: 96% (29 Nov 2018 04:43) (96% - 98%)      PHYSICAL EXAM:     GENERAL: no acute distress  HEENT: PERRLA, EOMI, moist oropharynx   RESPIRATORY: CTAB, no w/c   CARDIOVASCULAR: RRR, no murmurs, gallops, rubs  ABDOMINAL: soft, non-tender, non-distended, positive bowel sounds   EXTREMITIES: no clubbing, cyanosis, or edema  NEUROLOGICAL: alert and oriented x 3, non-focal  SKIN: +B/L LE venous stasis  MUSCULOSKELETAL: no gross joint deformity, no spinal process tenderness or stepoffs                          11.5   7.14  )-----------( 167      ( 29 Nov 2018 09:10 )             35.0     11-29    140  |  106  |  29<H>  ----------------------------<  129<H>  3.3<L>   |  20<L>  |  1.50<H>    Ca    8.2<L>      29 Nov 2018 07:32  Phos  2.3     11-29  Mg     2.1     11-29    TPro  5.1<L>  /  Alb  2.4<L>  /  TBili  0.3  /  DBili  x   /  AST  18  /  ALT  27  /  AlkPhos  80  11-29      CAPILLARY BLOOD GLUCOSE          MEDICATIONS  (STANDING):  amLODIPine   Tablet 5 milliGRAM(s) Oral daily  apixaban 2.5 milliGRAM(s) Oral every 12 hours  atorvastatin 10 milliGRAM(s) Oral at bedtime  lactated ringers. 1000 milliLiter(s) (100 mL/Hr) IV Continuous <Continuous>  meropenem  IVPB      meropenem  IVPB 1000 milliGRAM(s) IV Intermittent every 12 hours  potassium chloride   Powder 40 milliEquivalent(s) Oral once  predniSONE   Tablet 40 milliGRAM(s) Oral daily    MEDICATIONS  (PRN):  acetaminophen   Tablet .. 650 milliGRAM(s) Oral every 6 hours PRN Mild Pain (1 - 3)

## 2018-11-29 NOTE — DISCHARGE NOTE ADULT - HOME CARE AGENCY
[facility][phone][facility] for RN visit start of care on Friday, 12/7/18.

## 2018-11-29 NOTE — DISCHARGE NOTE ADULT - PATIENT PORTAL LINK FT
You can access the [facility] Patient Portal, offered by [facility], by registering with the following website: [email or web address]

## 2018-11-29 NOTE — PROGRESS NOTE ADULT - PROBLEM SELECTOR PLAN 6
- amlodipine 5mg po daily - s/p PPM, currently a-sensed, v-paced  - continue eliquis home dose of 2.5mg po q12

## 2018-11-29 NOTE — DISCHARGE NOTE ADULT - SECONDARY DIAGNOSIS.
JOSE MARIA (acute kidney injury) Atrial fibrillation Compression fracture of L4 lumbar vertebra Hypokalemia Pancreatic cyst CAD (coronary artery disease)

## 2018-11-29 NOTE — DISCHARGE NOTE ADULT - PLAN OF CARE
resolve infection gram negative jose alejandro bacteremia/ gram negative jose alejandro bacteremia  complete antibiotics  picc line care  home care follow up avoid kidney toxic medication  follow up kidney function test continue home medication activity as tolerated  develops new LE weakness, numbness/tingling, loss of sensation, loss of bowel/bladder control. Patient verbalizes understanding.  - continue ambulation as tolerated. Pt states he plays tennis 3 times a week and can navigate stairs without problems.  - no indication for PT. resolved First seen 8 years ago on EUS with Dr. Eliseo Vieyra was 2x1 cm benign features reported. Stable in size compared to in 6/2018. Discussed with radiology- appears benign. Guidelines recommend follow up contrast CT or MRCP w/wo contrast every 2 years x 4 years  - continue outpatient surveillance. continue medication as prescribed

## 2018-11-29 NOTE — DISCHARGE NOTE ADULT - MEDICATION SUMMARY - MEDICATIONS TO CHANGE
I will SWITCH the dose or number of times a day I take the medications listed below when I get home from the hospital:    amLODIPine 5 mg oral tablet  -- 1 tab(s) by mouth once a day    Lasix 40 mg oral tablet    predniSONE 20 mg oral tablet  -- 40 milligram(s) by mouth once a day

## 2018-11-29 NOTE — DISCHARGE NOTE ADULT - ADDITIONAL INSTRUCTIONS
follow up with [facility] [phone] in 3 weeks follow up with [facility] [phone] in 3 weeks  home care follow up;  bring your discharge instruction for MD visit to review your medication and follow up. follow up with [facility] [phone] in 3 weeks  home care follow up;  bring your discharge instruction for MD visit to review your medication and follow up.  follow up with your doctor while you are on steroids-blood sugar high and your hemoglobin A1c 7.4

## 2018-11-29 NOTE — CHART NOTE - NSCHARTNOTEFT_GEN_A_CORE
informed by RN pt with blood culture report + blood cultures prelim growth in aerobic bottle gram negative rods  Patient is a 88y old  Male who presents with a chief complaint of diarrhea x one day (28 Nov 2018 12:51)      Vital Signs Last 24 Hrs  T(C): 36.3 (29 Nov 2018 04:43), Max: 36.5 (28 Nov 2018 19:44)  T(F): 97.3 (29 Nov 2018 04:43), Max: 97.7 (28 Nov 2018 19:44)  HR: 74 (29 Nov 2018 04:43) (71 - 81)  BP: 155/80 (29 Nov 2018 04:43) (115/69 - 155/80)  BP(mean): --  RR: 16 (29 Nov 2018 04:43) (16 - 18)  SpO2: 96% (29 Nov 2018 04:43) (96% - 98%)                        11.5   7.14  )-----------( 167      ( 29 Nov 2018 09:10 )             35.0     11-29    140  |  106  |  29<H>  ----------------------------<  129<H>  3.3<L>   |  20<L>  |  1.50<H>    Ca    8.2<L>      29 Nov 2018 07:32  Phos  2.3     11-29  Mg     2.1     11-29    TPro  5.1<L>  /  Alb  2.4<L>  /  TBili  0.3  /  DBili  x   /  AST  18  /  ALT  27  /  AlkPhos  80  11-29    PT/INR - ( 27 Nov 2018 21:46 )   PT: 16.0 sec;   INR: 1.39 ratio         PTT - ( 27 Nov 2018 21:46 )  PTT:25.7 sec informed by RN pt with blood culture report + blood cultures prelim growth in aerobic bottle gram negative rods;  afebrile now; no diarrhea; denies SOB/CP  Patient is a 88y old  Male who presents with a chief complaint of diarrhea x one day (28 Nov 2018 12:51)      Vital Signs Last 24 Hrs  T(C): 36.3 (29 Nov 2018 04:43), Max: 36.5 (28 Nov 2018 19:44)  T(F): 97.3 (29 Nov 2018 04:43), Max: 97.7 (28 Nov 2018 19:44)  HR: 74 (29 Nov 2018 04:43) (71 - 81)  BP: 155/80 (29 Nov 2018 04:43) (115/69 - 155/80)  BP(mean): --  RR: 16 (29 Nov 2018 04:43) (16 - 18)  SpO2: 96% (29 Nov 2018 04:43) (96% - 98%)            RESPIRATORY: clear bilaterally  CARDIOVASCULAR: RRR, no murmurs,  ABDOMINAL: soft, non-tender, non-distended, positive bowel sounds   EXTREMITIES: no clubbing, cyanosis, or edema  NEUROLOGICAL: alert and oriented x 3, non-focal          lab:                11.5   7.14  )-----------( 167      ( 29 Nov 2018 09:10 )             35.0     11-29    140  |  106  |  29<H>  ----------------------------<  129<H>  3.3<L>   |  20<L>  |  1.50<H>    Ca    8.2<L>      29 Nov 2018 07:32  Phos  2.3     11-29  Mg     2.1     11-29    TPro  5.1<L>  /  Alb  2.4<L>  /  TBili  0.3  /  DBili  x   /  AST  18  /  ALT  27  /  AlkPhos  80  11-29    PT/INR - ( 27 Nov 2018 21:46 )   PT: 16.0 sec;   INR: 1.39 ratio         PTT - ( 27 Nov 2018 21:46 )  PTT:25.7 sec    AP  88 M w/pmh  HTN HLD DM CAD s/p CABG , s/p PPM for bradycardia, afib on Eliquis , metastatic melanoma  on chemo- admitted with diarrhea, febrile 102  blood culture positive for  gram negative jose alejandro;  d/w attending md and added antibiotic meropenem by MD; monitor and follow up blood cx sensitivity  f/u blood cx on 12/1/18  d/w pt plan of care  Dari Byrd(NP)  [address], [phone]

## 2018-11-29 NOTE — DISCHARGE NOTE ADULT - HOSPITAL COURSE
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   - sensitvities 11/28/18 : sensitive to eric, levaquin. Pt is however allergic to fluroquinolones  - s/p PICC line placement on 12/5/18  -home today on  meropenem 1gm iv q12 hours through 12/14/18  - Follow up in ID clinic in 3 weeks.   Diarrhea.  resolved  - Prednisone 20mg Three times daily  - Immunotherapy on hold for now.    JOSE MARIA (acute kidney injury).  - Cr improving.  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 Eliseo Vieyra was 2x1 cm benign features reported. Stable in size compared to in 6/2018. Discussed with radiology- appears benign. Guidelines recommend follow up contrast CT or MRCP w/wo contrast every 2 years x 4 years  - continue outpatient surveillance.   · Compression fracture of L4 lumbar vertebra.   Incidentally seen on CT abd/pelvis. No focal weakness, no loss of bowel/bladder control, nonfocal neuro exam, no spinal tenderness, no increase in baseline back pain, doubt that this is related to infection.   - discussed findings with patient and gave him appropriate precautions if: develops new LE weakness, numbness/tingling, loss of sensation, loss of bowel/bladder control. Patient verbalizes understanding.  - continue ambulation as tolerated. Pt states he plays tennis 3 times a week and can navigate stairs without problems.  - no indication for PT.    Atrial fibrillation. Plan: - s/p PPM, currently a-sensed, v-paced  -continue  Eliquis   -cleared by Md for discharge home with home care;

## 2018-11-29 NOTE — PROGRESS NOTE ADULT - PROBLEM SELECTOR PLAN 4
Incidentally seen on CT abd/pelvis. No focal weakness, no loss of bowel/bladder control, nonfocal neuro exam, no spinal tenderness, doubt that this is related to infection.   - discussed findings with patient and gave him appropriate precautions to seek immediate care if: develops new LE weakness, numbness/tingling, loss of sensation, loss of bowel/bladder control. Patient verbalizes understanding.  - continue ambulation as tolerated. Pt states he plays tennis 3 times a week and can navigate stairs without problems.  - no indication for PT Likely prerenal 2/2 presenting sepsis and dehydration from diarrhea. Improved with IVF, Cr 1.5 today  - encourage PO intake to match output  - monitor strict I/O  - monitor Cr  - dose medications renally, avoid nephrotoxins

## 2018-11-29 NOTE — PROGRESS NOTE ADULT - PROBLEM SELECTOR PLAN 5
- s/p PPM, currently a-sensed, v-paced  - continue eliquis home dose of 2.5mg po q12 Incidentally seen on CT abd/pelvis. No focal weakness, no loss of bowel/bladder control, nonfocal neuro exam, no spinal tenderness, doubt that this is related to infection.   - discussed findings with patient and gave him appropriate precautions to seek immediate care if: develops new LE weakness, numbness/tingling, loss of sensation, loss of bowel/bladder control. Patient verbalizes understanding.  - continue ambulation as tolerated. Pt states he plays tennis 3 times a week and can navigate stairs without problems.  - no indication for PT

## 2018-11-29 NOTE — DISCHARGE NOTE ADULT - CARE PROVIDERS DIRECT ADDRESSES
,[email or web address]

## 2018-11-29 NOTE — PROGRESS NOTE ADULT - PROBLEM SELECTOR PLAN 2
Doubt infectious diarrhea as GI pcr and cdif negative.   - continue prednisone 40mg po daily for immunotherapy related colitis  - empiric meropenem as above  - f/u h/o recs re: further immunotherapy plans in setting of bacteremia

## 2018-11-29 NOTE — DISCHARGE NOTE ADULT - CARE PROVIDER_API CALL
Edgar Amor), Hematology; HospicePalliative Medicine; Internal Medicine; Medical Oncology  [address] [place] [address]  Phone: [phone]  Fax: [phone] Edgar Amor), Hematology; HospicePalliative Medicine; Internal Medicine; Medical Oncology  [address] [place] [address]  Phone: [phone]  Fax: [phone]    Cuba Lopez), Infectious Disease; Internal Medicine  [address] [place] [address]  Phone: [phone]  Fax: [phone]

## 2018-11-29 NOTE — DISCHARGE NOTE ADULT - MEDICATION SUMMARY - MEDICATIONS TO TAKE
I will START or STAY ON the medications listed below when I get home from the hospital:    weekly cbc with diff cmp for 2 weeks and fax result to Dr. Lopez [phone]  -- Indication: For weekly lab    predniSONE 20 mg oral tablet  -- 1 tab(s) by mouth 3 times a day  -- Indication: For Steroid    acetaminophen 325 mg oral tablet  -- 2 tab(s) by mouth every 6 hours, As needed, Mild Pain (1 - 3)  -- Indication: For Pain    Eliquis 2.5 mg oral tablet  -- 1 tab(s) by mouth 2 times a day  -- Indication: For Atrial fibrillation    Lipitor 10 mg oral tablet  -- 1 tab(s) by mouth once a day  -- Indication: For lipids    amLODIPine 10 mg oral tablet  -- 1 tab(s) by mouth once a day  -- Indication: For HTN (hypertension)    meropenem 1000 mg/ 50 mL-NaCl 0.9% intravenous solution  -- 1000 milligram(s) intravenously 2 times a day through 12/14/18  -- May cause drowsiness or dizziness.  This drug may impair the ability to drive or operate machinery.  Use care until you become familiar with its effects.    -- Indication: For bacteremia    bacitracin 500 units/g topical ointment  -- 1 application on skin 2 times a day  -- Indication: For Skin tear  care    Lasix 20 mg oral tablet  -- 1 tab(s) by mouth every other day   -- Indication: For Diuretic    B-12 1000 mcg oral tablet, extended release  -- orally once a day  -- Indication: For vitamin    Vitamin D3 1000 intl units oral tablet  -- 1 tab(s) by mouth once a day  -- Indication: For vitamin I will START or STAY ON the medications listed below when I get home from the hospital:    weekly cbc with diff cmp for 2 weeks and fax result to Dr. Lopez [phone]  -- Indication: For weekly lab    predniSONE 20 mg oral tablet  -- 1 tab(s) by mouth 3 times a day  -- Indication: For Steroid    acetaminophen 325 mg oral tablet  -- 2 tab(s) by mouth every 6 hours, As needed, Mild Pain (1 - 3)  -- Indication: For Pain    Eliquis 2.5 mg oral tablet  -- 1 tab(s) by mouth 2 times a day  -- Indication: For Atrial fibrillation    Lipitor 10 mg oral tablet  -- 1 tab(s) by mouth once a day  -- Indication: For lipids    amLODIPine 10 mg oral tablet  -- 1 tab(s) by mouth once a day  -- Indication: For HTN (hypertension)    meropenem 1000 mg/ 50 mL-NaCl 0.9% intravenous solution  -- 1000 milligram(s) intravenously 2 times a day through 12/14/18  -- May cause drowsiness or dizziness.  This drug may impair the ability to drive or operate machinery.  Use care until you become familiar with its effects.    -- Indication: For bacteremia    bacitracin 500 units/g topical ointment  -- 1 application on skin 2 times a day  -- Indication: For Skin tear  care    Lasix 20 mg oral tablet  -- 1 tab(s) by mouth every other day   -- Indication: For Diuretic    B-12 1000 mcg oral tablet, extended release  -- orally once a day  -- Indication: For vitamin    Vitamin D3 1000 intl units oral tablet  -- 1 tab(s) by mouth once a day  -- Indication: For vitamin

## 2018-11-29 NOTE — PROGRESS NOTE ADULT - PROBLEM SELECTOR PLAN 9
- ppx; on eliquis  - diet: regular  - dispo: pending clinical progress - ppx; on eliquis  - diet: regular  - dispo: pending clinical progress    Plan discussed with patient and floor NP. - on immunotherapy,  - h/o following

## 2018-11-30 DIAGNOSIS — K86.2 CYST OF PANCREAS: ICD-10-CM

## 2018-11-30 LAB
ANION GAP SERPL CALC-SCNC: 15 MMOL/L — SIGNIFICANT CHANGE UP (ref 5–17)
BASOPHILS # BLD AUTO: 0.03 K/UL — SIGNIFICANT CHANGE UP (ref 0–0.2)
BASOPHILS NFR BLD AUTO: 0.4 % — SIGNIFICANT CHANGE UP (ref 0–2)
BUN SERPL-MCNC: 30 MG/DL — HIGH (ref 7–23)
CALCIUM SERPL-MCNC: 8.7 MG/DL — SIGNIFICANT CHANGE UP (ref 8.4–10.5)
CHLORIDE SERPL-SCNC: 107 MMOL/L — SIGNIFICANT CHANGE UP (ref 96–108)
CO2 SERPL-SCNC: 17 MMOL/L — LOW (ref 22–31)
CREAT SERPL-MCNC: 1.68 MG/DL — HIGH (ref 0.5–1.3)
EOSINOPHIL # BLD AUTO: 0.02 K/UL — SIGNIFICANT CHANGE UP (ref 0–0.5)
EOSINOPHIL NFR BLD AUTO: 0.3 % — SIGNIFICANT CHANGE UP (ref 0–6)
GLUCOSE SERPL-MCNC: 81 MG/DL — SIGNIFICANT CHANGE UP (ref 70–99)
HCT VFR BLD CALC: 35.5 % — LOW (ref 39–50)
HGB BLD-MCNC: 11.5 G/DL — LOW (ref 13–17)
IMM GRANULOCYTES NFR BLD AUTO: 2.9 % — HIGH (ref 0–1.5)
LYMPHOCYTES # BLD AUTO: 1.23 K/UL — SIGNIFICANT CHANGE UP (ref 1–3.3)
LYMPHOCYTES # BLD AUTO: 17.5 % — SIGNIFICANT CHANGE UP (ref 13–44)
MAGNESIUM SERPL-MCNC: 2.4 MG/DL — SIGNIFICANT CHANGE UP (ref 1.6–2.6)
MCHC RBC-ENTMCNC: 28.9 PG — SIGNIFICANT CHANGE UP (ref 27–34)
MCHC RBC-ENTMCNC: 32.4 GM/DL — SIGNIFICANT CHANGE UP (ref 32–36)
MCV RBC AUTO: 89.2 FL — SIGNIFICANT CHANGE UP (ref 80–100)
MONOCYTES # BLD AUTO: 0.74 K/UL — SIGNIFICANT CHANGE UP (ref 0–0.9)
MONOCYTES NFR BLD AUTO: 10.6 % — SIGNIFICANT CHANGE UP (ref 2–14)
NEUTROPHILS # BLD AUTO: 4.79 K/UL — SIGNIFICANT CHANGE UP (ref 1.8–7.4)
NEUTROPHILS NFR BLD AUTO: 68.3 % — SIGNIFICANT CHANGE UP (ref 43–77)
PLATELET # BLD AUTO: 154 K/UL — SIGNIFICANT CHANGE UP (ref 150–400)
POTASSIUM SERPL-MCNC: 4.8 MMOL/L — SIGNIFICANT CHANGE UP (ref 3.5–5.3)
POTASSIUM SERPL-SCNC: 4.8 MMOL/L — SIGNIFICANT CHANGE UP (ref 3.5–5.3)
RBC # BLD: 3.98 M/UL — LOW (ref 4.2–5.8)
RBC # FLD: 17 % — HIGH (ref 10.3–14.5)
SODIUM SERPL-SCNC: 139 MMOL/L — SIGNIFICANT CHANGE UP (ref 135–145)
WBC # BLD: 7.01 K/UL — SIGNIFICANT CHANGE UP (ref 3.8–10.5)
WBC # FLD AUTO: 7.01 K/UL — SIGNIFICANT CHANGE UP (ref 3.8–10.5)

## 2018-11-30 PROCEDURE — 99222 1ST HOSP IP/OBS MODERATE 55: CPT | Mod: GC

## 2018-11-30 PROCEDURE — 99232 SBSQ HOSP IP/OBS MODERATE 35: CPT | Mod: GC

## 2018-11-30 PROCEDURE — 99233 SBSQ HOSP IP/OBS HIGH 50: CPT

## 2018-11-30 RX ORDER — DEXTROSE 50 % IN WATER 50 %
25 SYRINGE (ML) INTRAVENOUS ONCE
Qty: 0 | Refills: 0 | Status: DISCONTINUED | OUTPATIENT
Start: 2018-11-30 | End: 2018-12-06

## 2018-11-30 RX ORDER — AMLODIPINE BESYLATE 2.5 MG/1
5 TABLET ORAL ONCE
Qty: 0 | Refills: 0 | Status: COMPLETED | OUTPATIENT
Start: 2018-11-30 | End: 2018-11-30

## 2018-11-30 RX ORDER — GLUCAGON INJECTION, SOLUTION 0.5 MG/.1ML
1 INJECTION, SOLUTION SUBCUTANEOUS ONCE
Qty: 0 | Refills: 0 | Status: DISCONTINUED | OUTPATIENT
Start: 2018-11-30 | End: 2018-12-06

## 2018-11-30 RX ORDER — SODIUM CHLORIDE 9 MG/ML
1000 INJECTION, SOLUTION INTRAVENOUS
Qty: 0 | Refills: 0 | Status: DISCONTINUED | OUTPATIENT
Start: 2018-11-30 | End: 2018-12-06

## 2018-11-30 RX ORDER — INSULIN LISPRO 100/ML
VIAL (ML) SUBCUTANEOUS
Qty: 0 | Refills: 0 | Status: DISCONTINUED | OUTPATIENT
Start: 2018-11-30 | End: 2018-12-06

## 2018-11-30 RX ORDER — DEXTROSE 50 % IN WATER 50 %
12.5 SYRINGE (ML) INTRAVENOUS ONCE
Qty: 0 | Refills: 0 | Status: DISCONTINUED | OUTPATIENT
Start: 2018-11-30 | End: 2018-12-06

## 2018-11-30 RX ORDER — AMLODIPINE BESYLATE 2.5 MG/1
10 TABLET ORAL DAILY
Qty: 0 | Refills: 0 | Status: DISCONTINUED | OUTPATIENT
Start: 2018-12-01 | End: 2018-12-06

## 2018-11-30 RX ORDER — DEXTROSE 50 % IN WATER 50 %
15 SYRINGE (ML) INTRAVENOUS ONCE
Qty: 0 | Refills: 0 | Status: DISCONTINUED | OUTPATIENT
Start: 2018-11-30 | End: 2018-12-06

## 2018-11-30 RX ADMIN — SODIUM CHLORIDE 75 MILLILITER(S): 9 INJECTION, SOLUTION INTRAVENOUS at 23:11

## 2018-11-30 RX ADMIN — Medication 2: at 13:25

## 2018-11-30 RX ADMIN — SODIUM CHLORIDE 75 MILLILITER(S): 9 INJECTION, SOLUTION INTRAVENOUS at 08:46

## 2018-11-30 RX ADMIN — MEROPENEM 100 MILLIGRAM(S): 1 INJECTION INTRAVENOUS at 23:11

## 2018-11-30 RX ADMIN — Medication 20 MILLIGRAM(S): at 21:48

## 2018-11-30 RX ADMIN — Medication 40 MILLIGRAM(S): at 06:07

## 2018-11-30 RX ADMIN — AMLODIPINE BESYLATE 5 MILLIGRAM(S): 2.5 TABLET ORAL at 06:06

## 2018-11-30 RX ADMIN — MEROPENEM 100 MILLIGRAM(S): 1 INJECTION INTRAVENOUS at 11:09

## 2018-11-30 RX ADMIN — ATORVASTATIN CALCIUM 10 MILLIGRAM(S): 80 TABLET, FILM COATED ORAL at 21:48

## 2018-11-30 RX ADMIN — Medication 1: at 18:16

## 2018-11-30 RX ADMIN — AMLODIPINE BESYLATE 5 MILLIGRAM(S): 2.5 TABLET ORAL at 08:45

## 2018-11-30 RX ADMIN — APIXABAN 2.5 MILLIGRAM(S): 2.5 TABLET, FILM COATED ORAL at 06:06

## 2018-11-30 RX ADMIN — APIXABAN 2.5 MILLIGRAM(S): 2.5 TABLET, FILM COATED ORAL at 17:45

## 2018-11-30 NOTE — CONSULT NOTE ADULT - ASSESSMENT
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     BCx (11/28)- 2/4 bottles- sphingomonas paucimobilis    - monitor BCx  - repeat BCx q48h until clear  - c/w meropenem

## 2018-11-30 NOTE — CONSULT NOTE ADULT - ATTENDING COMMENTS
87 yo M hx HTN, HLD, DM, CAD s/p CABG , s/p PPM for bradycardia, afib on Eliquis , metastatic melanoma s/p excision and skin grafts currently  on Opvido and Yervoy, presents with diarrhea.  No fevers, no chills  Diarrhea improving  Otherwise no complaints to suggest source  BCX with Sphingomonas  Patient immunocompromised on steroids, opvido, yervoy  Overall, GNR Bacteremia, diarrhea, immunocompromised  - Meropenem 1g q 12  - Repeat BCX for clearance; follow up  for sensitivities  - Final antibiotic plan depending on culture results  - Consider CT chest if worsening or any signs sepsis    Cuba Lopez MD  Pager [phone]  After 5pm and on weekends call [phone]    I was physically present for the key portions of the evaluation and management service provided. I saw and examined the patient. I agree with the above history, physical, and plan except for any discrepancies which I have documented in “Attending Attestation.” Please refer to “Attending Attestation” for final plan.
Pt appears nontoxic and hemodynamically stable. Had chills yesterday and fever today. He has been admitted for further evaluation with Grade 3 diarrhea from immunotherapy. He should resume his home dose of prednisone which he has been taking; off ipilumumab and nivolumab x 1 month.

## 2018-11-30 NOTE — PROGRESS NOTE ADULT - SUBJECTIVE AND OBJECTIVE BOX
Hospitalist- Bartolo Oates MD  Pager: [phone]  If no response or off-hours, page [phone]  -------------------------------------  Patient is a 88y old  Male who presents with a chief complaint of diarrhea x one day (29 Nov 2018 13:43)  Subjective: No acute events overnight, no fevers/chills. Reports BM x 3 since yesterday- semisolid/nonwattery, no blood. No abd pain/n/v. Frustrated to be staying in the hospital when he feels so well.    14 point ros otherwise negative.    VITAL SIGNS:  Vital Signs Last 24 Hrs  T(C): 36.4 (30 Nov 2018 08:40), Max: 36.5 (29 Nov 2018 21:13)  T(F): 97.5 (30 Nov 2018 08:40), Max: 97.7 (29 Nov 2018 21:13)  HR: 78 (30 Nov 2018 08:40) (78 - 88)  BP: 157/83 (30 Nov 2018 08:40) (140/83 - 157/83)  BP(mean): --  RR: 18 (30 Nov 2018 08:40) (18 - 18)  SpO2: 94% (30 Nov 2018 08:40) (94% - 97%)      PHYSICAL EXAM:     GENERAL: no acute distress  HEENT: PERRLA, EOMI, moist oropharynx   RESPIRATORY: CTAB, no w/c   CARDIOVASCULAR: RRR, no murmurs, gallops, rubs  ABDOMINAL: soft, non-tender, non-distended, positive bowel sounds   EXTREMITIES: no clubbing, cyanosis, or edema  NEUROLOGICAL: alert and oriented x 3, non-focal  SKIN: no rashes or lesions   MUSCULOSKELETAL: no gross joint deformity, no spinal tenderness                          11.5   7.01  )-----------( 154      ( 30 Nov 2018 08:14 )             35.5     11-30    139  |  107  |  30<H>  ----------------------------<  81  4.8   |  17<L>  |  1.68<H>    Ca    8.7      30 Nov 2018 07:22  Phos  2.3     11-29  Mg     2.4     11-30    TPro  5.1<L>  /  Alb  2.4<L>  /  TBili  0.3  /  DBili  x   /  AST  18  /  ALT  27  /  AlkPhos  80  11-29      CAPILLARY BLOOD GLUCOSE      POCT Blood Glucose.: 122 mg/dL (30 Nov 2018 08:48)      MEDICATIONS  (STANDING):  apixaban 2.5 milliGRAM(s) Oral every 12 hours  atorvastatin 10 milliGRAM(s) Oral at bedtime  dextrose 5%. 1000 milliLiter(s) (50 mL/Hr) IV Continuous <Continuous>  dextrose 50% Injectable 12.5 Gram(s) IV Push once  dextrose 50% Injectable 25 Gram(s) IV Push once  dextrose 50% Injectable 25 Gram(s) IV Push once  insulin lispro (HumaLOG) corrective regimen sliding scale   SubCutaneous Before meals and at bedtime  lactated ringers. 1000 milliLiter(s) (75 mL/Hr) IV Continuous <Continuous>  lactated ringers. 1000 milliLiter(s) (100 mL/Hr) IV Continuous <Continuous>  meropenem  IVPB      meropenem  IVPB 1000 milliGRAM(s) IV Intermittent every 12 hours  predniSONE   Tablet 40 milliGRAM(s) Oral daily    MEDICATIONS  (PRN):  acetaminophen   Tablet .. 650 milliGRAM(s) Oral every 6 hours PRN Mild Pain (1 - 3)  dextrose 40% Gel 15 Gram(s) Oral once PRN Blood Glucose LESS THAN 70 milliGRAM(s)/deciliter  glucagon  Injectable 1 milliGRAM(s) IntraMuscular once PRN Glucose LESS THAN 70 milligrams/deciliter

## 2018-11-30 NOTE — CHART NOTE - NSCHARTNOTEFT_GEN_A_CORE
Asked by primary team to see Mr. Gonsalves for evaluation of diarrhea and possible colonoscopy for grading of Nivolumab-induced colitis. The patient declined consultation and when asked if he wanted to talk about colonoscopy he stated "Absolutely not." Additionally, the patient was previously seen by Dr. Duy Barnes and underwent EUS with Dr. Barnes in 2010, however, Dr. Barnes was contacted and has no interest in seeing the patient.    Please call GI if the patient is interested GI consult. Asked by primary team to see Mr. Gonsalves for evaluation of diarrhea and possible colonoscopy for grading of Nivolumab-induced colitis. The patient declined consultation and when asked if he wanted to talk about colonoscopy he stated "Absolutely not." Additionally, the patient was previously seen by Dr. Duy Barnes and underwent EUS with Dr. Barnes in 2010, however, Dr. Barnes was contacted and has no interest in seeing the patient.    Please re-consult GI if the patient is interested in full GI consultation or colonoscopy.    Please call GI ([phone]) if there are any additional questions or concerns. Please call on-call GI fellow after 5pm and on weekends.

## 2018-11-30 NOTE — PROGRESS NOTE ADULT - SUBJECTIVE AND OBJECTIVE BOX
INTERVAL HPI/OVERNIGHT EVENTS:  Patient S&E at bedside.   Patient now with sphingomonas paucimobilis bacteremia (GNR) on meropenem.     VITAL SIGNS:  T(F): 97.4 (11-30-18 @ 10:40)  HR: 84 (11-30-18 @ 10:40)  BP: 137/74 (11-30-18 @ 10:40)  RR: 18 (11-30-18 @ 10:40)  SpO2: 96% (11-30-18 @ 10:40)      PHYSICAL EXAM:  Constitutional: NAD, in chair   Eyes: EOMI, sclera non-icteric  Neck: supple  Respiratory: CTA b/l, good air entry b/l  Cardiovascular: RRR, no M/R/G  Gastrointestinal: soft, NTND  Extremities: no c/c/e  Neurological: AAOx3      MEDICATIONS  (STANDING):  apixaban 2.5 milliGRAM(s) Oral every 12 hours  atorvastatin 10 milliGRAM(s) Oral at bedtime  dextrose 5%. 1000 milliLiter(s) (50 mL/Hr) IV Continuous <Continuous>  dextrose 50% Injectable 12.5 Gram(s) IV Push once  dextrose 50% Injectable 25 Gram(s) IV Push once  dextrose 50% Injectable 25 Gram(s) IV Push once  insulin lispro (HumaLOG) corrective regimen sliding scale   SubCutaneous Before meals and at bedtime  lactated ringers. 1000 milliLiter(s) (75 mL/Hr) IV Continuous <Continuous>  lactated ringers. 1000 milliLiter(s) (100 mL/Hr) IV Continuous <Continuous>  meropenem  IVPB      meropenem  IVPB 1000 milliGRAM(s) IV Intermittent every 12 hours  predniSONE   Tablet 20 milliGRAM(s) Oral three times a day    MEDICATIONS  (PRN):  acetaminophen   Tablet .. 650 milliGRAM(s) Oral every 6 hours PRN Mild Pain (1 - 3)  dextrose 40% Gel 15 Gram(s) Oral once PRN Blood Glucose LESS THAN 70 milliGRAM(s)/deciliter  glucagon  Injectable 1 milliGRAM(s) IntraMuscular once PRN Glucose LESS THAN 70 milligrams/deciliter      Allergies    ciprofloxacin (Unknown)  Levaquin (Unknown)  penicillin (Unknown)            LABS:                        11.5   7.01  )-----------( 154      ( 30 Nov 2018 08:14 )             35.5     11-30    139  |  107  |  30<H>  ----------------------------<  81  4.8   |  17<L>  |  1.68<H>    Ca    8.7      30 Nov 2018 07:22  Phos  2.3     11-29  Mg     2.4     11-30    TPro  5.1<L>  /  Alb  2.4<L>  /  TBili  0.3  /  DBili  x   /  AST  18  /  ALT  27  /  AlkPhos  80  11-29          RADIOLOGY & ADDITIONAL TESTS:  Studies reviewed.

## 2018-11-30 NOTE — PROGRESS NOTE ADULT - PROBLEM SELECTOR PLAN 4
Stable in size compared to in 6/2018. Discussed with radiology- guidelines recommend follow up contrast CT or MRCP w/wo contrast every 2 years x 4 years  - recommend outpatient surveillance First seen 8 years ago on EUS with Dr. Eliseo Vieyra was 2x1 cm benign features reported. Stable in size compared to in 6/2018. Discussed with radiology- appears benign. Guidelines recommend follow up contrast CT or MRCP w/wo contrast every 2 years x 4 years  - continue outpatient surveillance

## 2018-11-30 NOTE — CONSULT NOTE ADULT - SUBJECTIVE AND OBJECTIVE BOX
HPI: 87yo M hx HTN, HLD, DM, CAD s/p CABG , s/p PPM for bradycardia, afib on Eliquis , metastatic melanoma s/p excision and skin grafts currently  on Opvido and Yervoy, presents for  diarrhea x one day. Pt was having diarrhea for past couple months and it was attributed to his immunotherapy.  Pt states as an outpt, he was placed on steroids to help counteract the diarrhea and that was helping. However day of admission, he had worsening diarrhea where he could not make it to the bathroom. So his wife called EMS and brought him in to ED.    Denies fevers, chills, cough, rhinorrhea, sore throat, cp, abd pain, n/v, dysuria. Has not had any sick contacts or recent travel. Hasn't drank water out of a stream, been in jacuzzis/hot tubs.     PAST MEDICAL & SURGICAL HISTORY:  Pacemaker  Melanoma  Atrial fibrillation  CAD (coronary artery disease)  DM (diabetes mellitus)  HTN (hypertension)  S/P CABG x 4      Allergies    ciprofloxacin (Unknown)  Levaquin (Unknown)  penicillin (Unknown)    Intolerances  ANTIMICROBIALS:  meropenem  IVPB    meropenem  IVPB 1000 every 12 hours      OTHER MEDS:  acetaminophen   Tablet .. 650 milliGRAM(s) Oral every 6 hours PRN  apixaban 2.5 milliGRAM(s) Oral every 12 hours  atorvastatin 10 milliGRAM(s) Oral at bedtime  dextrose 40% Gel 15 Gram(s) Oral once PRN  dextrose 5%. 1000 milliLiter(s) IV Continuous <Continuous>  dextrose 50% Injectable 12.5 Gram(s) IV Push once  dextrose 50% Injectable 25 Gram(s) IV Push once  dextrose 50% Injectable 25 Gram(s) IV Push once  glucagon  Injectable 1 milliGRAM(s) IntraMuscular once PRN  insulin lispro (HumaLOG) corrective regimen sliding scale   SubCutaneous Before meals and at bedtime  lactated ringers. 1000 milliLiter(s) IV Continuous <Continuous>  lactated ringers. 1000 milliLiter(s) IV Continuous <Continuous>  predniSONE   Tablet 20 milliGRAM(s) Oral three times a day      SOCIAL HISTORY:  Marital Status:    Lives with: wife  social EtOH, quit tobacco in 1948 and denies drugs    FAMILY HISTORY:  Family history of chronic ischemic heart disease (Father)      ROS:  All other systems negative     Constitutional: no fever, no chills  Eye: no eye pain, no redness, no vision changes  ENT:  no sore throat, no rhinorrhea  Cardiovascular:  no chest pain, no palpitation  Respiratory:  no SOB, no cough  GI:  no abd pain, no vomiting, diarrhea  urinary: no dysuria, no hematuria, no flank pain  : no  discharge or bleeding  musculoskeletal:  no joint pain, no joint swelling  skin:  no rash  neurology:  no headache     Physical Exam:    General:    NAD, non toxic  Head: atraumatic, normocephalic  Eyes: normal sclera and conjunctiva  ENT:   no oropharyngeal lesions, no LAD, neck supple  Cardio:    regular S1,S2, no murmur  Respiratory:   clear b/l, no wheezing  abd:   soft, BS +, not tender, no hepatosplenomegaly  :   no díaz  Musculoskeletal : no joint swelling, no edema  Skin: ecchymotic skin, no skin wounds/ulcers noted  vascular: normal pulses  Neurologic:  no focal deficits  psych: normal affect      Drug Dosing Weight  Height (cm): 177.8 (28 Nov 2018 16:36)  Weight (kg): 74.3 (28 Nov 2018 16:36)  BMI (kg/m2): 23.5 (28 Nov 2018 16:36)  BSA (m2): 1.92 (28 Nov 2018 16:36)    Vital Signs Last 24 Hrs  T(F): 97.4 (11-30-18 @ 10:40), Max: 102.7 (11-27-18 @ 22:06)    Vital Signs Last 24 Hrs  HR: 84 (11-30-18 @ 10:40) (78 - 87)  BP: 137/74 (11-30-18 @ 10:40) (137/74 - 157/83)  RR: 18 (11-30-18 @ 10:40)  SpO2: 96% (11-30-18 @ 10:40) (94% - 97%)  Wt(kg): --                          11.5   7.01  )-----------( 154      ( 30 Nov 2018 08:14 )             35.5       11-30    139  |  107  |  30<H>  ----------------------------<  81  4.8   |  17<L>  |  1.68<H>    Ca    8.7      30 Nov 2018 07:22  Phos  2.3     11-29  Mg     2.4     11-30    TPro  5.1<L>  /  Alb  2.4<L>  /  TBili  0.3  /  DBili  x   /  AST  18  /  ALT  27  /  AlkPhos  80  11-29          MICROBIOLOGY:  .Stool Feces  11-28-18   GI PCR Results: NOT detected  *******Please Note:*******  GI panel PCR evaluates for:  Campylobacter, Plesiomonas shigelloides, Salmonella,  Vibrio, Yersinia enterocolitica, Enteroaggregative  Escherichia coli (EAEC), Enteropathogenic E.coli (EPEC),  Enterotoxigenic E. coli (ETEC) lt/st, Shiga-like  toxin-producing E. coli (STEC) stx1/stx2,  Shigella/ Enteroinvasive E. coli (EIEC), Cryptosporidium,  Cyclospora cayetanensis, Entamoeba histolytica,  Giardia lamblia, Adenovirus F 40/41, Astrovirus,  Norovirus GI/GII, Rotavirus A, Sapovirus  --  --      .Urine Clean Catch (Midstream)  11-28-18   <10,000 CFU/ml Normal Urogenital becki present  --  --      .Blood Blood  11-28-18   Growth in aerobic bottle: Sphingomonas paucimobilis  --  Blood Culture PCR      Rapid RVP Result: NotDetec (11-27 @ 22:24)    Clostridium difficile GDH Toxins A&amp;B, EIA:   Negative (11-28-18 @ 11:31)  Clostridium difficile GDH Interpretation: Negative for toxigenic C. Difficile.  This specimen is negative for C.  Difficile glutamate dehydrogenase (GDH) antigen and negative for C.  Difficile Toxins A & B, by EIA.  GDH is a highly sensitive screening  marker for C. Difficile that is produced in large amounts by all C.  Difficile strains, both toxigenic and nontoxigenic.  This assay has not  been validated as a test of cure.  Repeat testing during the same episode  of diarrhea is of limited value and is discouraged.  The results of this  assay should always be interpreted in conjunction with pateint's clinical  history. (11-28-18 @ 11:31)      RADIOLOGY:    CXR- No focal consolidation visualized.  ct a/p- No bowel obstruction or bowel wall thickening.    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   Differential includes peripancreatic fluid collection versus cystic   neoplasm. Further evaluation with MRCP with and without contrast can be   obtained.    Degenerative changes of the spine with compression deformity of the L4   vertebral body with mild bony retropulsion. HPI: 87 yo M hx HTN, HLD, DM, CAD s/p CABG , s/p PPM for bradycardia, afib on Eliquis , metastatic melanoma s/p excision and skin grafts currently  on Opvido and Yervoy, presents for  diarrhea x one day. Pt was having diarrhea for past couple months and it was attributed to his immunotherapy.  Pt states as an outpt, he was placed on steroids to help counteract the diarrhea and that was helping. However day of admission, he had worsening diarrhea where he could not make it to the bathroom. So his wife called EMS and brought him in to ED.    Denies fevers, chills, cough, rhinorrhea, sore throat, cp, abd pain, n/v, dysuria. Has not had any sick contacts or recent travel. Hasn't drank water out of a stream, been in jacuzzis/hot tubs.     PAST MEDICAL & SURGICAL HISTORY:  Pacemaker  Melanoma  Atrial fibrillation  CAD (coronary artery disease)  DM (diabetes mellitus)  HTN (hypertension)  S/P CABG x 4    Allergies    ciprofloxacin (Unknown)  Levaquin (Unknown)  penicillin (Unknown)    Intolerances  ANTIMICROBIALS:  meropenem  IVPB    meropenem  IVPB 1000 every 12 hours    OTHER MEDS:  acetaminophen   Tablet .. 650 milliGRAM(s) Oral every 6 hours PRN  apixaban 2.5 milliGRAM(s) Oral every 12 hours  atorvastatin 10 milliGRAM(s) Oral at bedtime  dextrose 40% Gel 15 Gram(s) Oral once PRN  dextrose 5%. 1000 milliLiter(s) IV Continuous <Continuous>  dextrose 50% Injectable 12.5 Gram(s) IV Push once  dextrose 50% Injectable 25 Gram(s) IV Push once  dextrose 50% Injectable 25 Gram(s) IV Push once  glucagon  Injectable 1 milliGRAM(s) IntraMuscular once PRN  insulin lispro (HumaLOG) corrective regimen sliding scale   SubCutaneous Before meals and at bedtime  lactated ringers. 1000 milliLiter(s) IV Continuous <Continuous>  lactated ringers. 1000 milliLiter(s) IV Continuous <Continuous>  predniSONE   Tablet 20 milliGRAM(s) Oral three times a day    SOCIAL HISTORY:  Marital Status:    Lives with: wife  social EtOH, quit tobacco in 1948 and denies drugs    FAMILY HISTORY:  Family history of chronic ischemic heart disease (Father)    ROS:  All other systems negative   Constitutional: no fever, no chills  Eye: no eye pain, no redness, no vision changes  ENT:  no sore throat, no rhinorrhea  Cardiovascular:  no chest pain, no palpitation  Respiratory:  no SOB, no cough  GI:  no abd pain, no vomiting, diarrhea  urinary: no dysuria, no hematuria, no flank pain  : no  discharge or bleeding  musculoskeletal:  no joint pain, no joint swelling  skin:  no rash  neurology:  no headache     Physical Exam:    General:    NAD, non toxic  Head: atraumatic, normocephalic  Eyes: normal sclera and conjunctiva  ENT:   no oropharyngeal lesions, no LAD, neck supple  Cardio:    regular S1,S2, no murmur  Respiratory:   clear b/l, no wheezing  abd:   soft, BS +, not tender, no hepatosplenomegaly  :   no díaz  Musculoskeletal : no joint swelling, no edema  Skin: ecchymotic skin, no skin wounds/ulcers noted  vascular: normal pulses  Neurologic:  no focal deficits  psych: normal affect    Drug Dosing Weight  Height (cm): 177.8 (28 Nov 2018 16:36)  Weight (kg): 74.3 (28 Nov 2018 16:36)  BMI (kg/m2): 23.5 (28 Nov 2018 16:36)  BSA (m2): 1.92 (28 Nov 2018 16:36)    Vital Signs Last 24 Hrs  T(F): 97.4 (11-30-18 @ 10:40), Max: 102.7 (11-27-18 @ 22:06)    Vital Signs Last 24 Hrs  HR: 84 (11-30-18 @ 10:40) (78 - 87)  BP: 137/74 (11-30-18 @ 10:40) (137/74 - 157/83)  RR: 18 (11-30-18 @ 10:40)  SpO2: 96% (11-30-18 @ 10:40) (94% - 97%)    Labs:                        11.5   7.01  )-----------( 154      ( 30 Nov 2018 08:14 )             35.5     11-30    139  |  107  |  30<H>  ----------------------------<  81  4.8   |  17<L>  |  1.68<H>    Ca    8.7      30 Nov 2018 07:22  Phos  2.3     11-29  Mg     2.4     11-30    TPro  5.1<L>  /  Alb  2.4<L>  /  TBili  0.3  /  DBili  x   /  AST  18  /  ALT  27  /  AlkPhos  80  11-29    MICROBIOLOGY:  .Stool Feces  11-28-18   GI PCR Results: NOT detected  *******Please Note:*******  GI panel PCR evaluates for:  Campylobacter, Plesiomonas shigelloides, Salmonella,  Vibrio, Yersinia enterocolitica, Enteroaggregative  Escherichia coli (EAEC), Enteropathogenic E.coli (EPEC),  Enterotoxigenic E. coli (ETEC) lt/st, Shiga-like  toxin-producing E. coli (STEC) stx1/stx2,  Shigella/ Enteroinvasive E. coli (EIEC), Cryptosporidium,  Cyclospora cayetanensis, Entamoeba histolytica,  Giardia lamblia, Adenovirus F 40/41, Astrovirus,  Norovirus GI/GII, Rotavirus A, Sapovirus      .Urine Clean Catch (Midstream)  11-28-18   <10,000 CFU/ml Normal Urogenital becki present     .Blood Blood  11-28-18   Growth in aerobic bottle: Sphingomonas paucimobilis  --  Blood Culture PCR    Rapid RVP Result: NotDetec (11-27 @ 22:24)    Clostridium difficile GDH Toxins A&amp;B, EIA:   Negative (11-28-18 @ 11:31)    RADIOLOGY:    CXR- No focal consolidation visualized.  ct a/p- No bowel obstruction or bowel wall thickening.    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   Differential includes peripancreatic fluid collection versus cystic   neoplasm. Further evaluation with MRCP with and without contrast can be   obtained.    Degenerative changes of the spine with compression deformity of the L4   vertebral body with mild bony retropulsion.

## 2018-11-30 NOTE — PROGRESS NOTE ADULT - PROBLEM SELECTOR PLAN 5
Incidentally seen on CT abd/pelvis. No focal weakness, no loss of bowel/bladder control, nonfocal neuro exam, no spinal tenderness, no increase in baseline back pain, doubt that this is related to infection.   - discussed findings with patient and gave him appropriate precautions if: develops new LE weakness, numbness/tingling, loss of sensation, loss of bowel/bladder control. Patient verbalizes understanding.  - continue ambulation as tolerated. Pt states he plays tennis 3 times a week and can navigate stairs without problems.  - no indication for PT

## 2018-11-30 NOTE — PROGRESS NOTE ADULT - PROBLEM SELECTOR PLAN 1
Febrile to 102.7, tachycardic in setting of diarrhea while on chronic prednisone therapy. Initial concern for sepsis 2/2 infectious diarrhea, however now suspect initial sepsis was 2/2 gram negative jose alejandro bacteremia. No longer septic. 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 L4 compression deformity shows no other signs of inflammation/reaction to suggest abscess. Neither findings are suspicious for infectious source.  - initial biofire pcr pan-negative, f/u final org id and sensitivities  - given allergies and unclear organism, continue meropenem 1gm iv q12 hours (renally dosed) for now, de-escalate pending final sensitivities  - consider ID consult to finalize abx regimen pending org id/sensitivities given multiple drug allergies  - repeat surveillance cultures in 48 hours (11/30/18)  - tylenol prn fever Febrile to 102.7, tachycardic in setting of diarrhea while on chronic prednisone therapy. Initial concern for sepsis 2/2 infectious diarrhea, however now suspect initial sepsis was 2/2 gram negative jose alejandro bacteremia. No longer septic. 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 L4 compression deformity shows no other signs of inflammation/reaction to suggest abscess. Neither findings are suspicious for infectious source.  - blood cultures resulted as sphingomonas paucimobilis- will consult ID for further recs  - given allergies and unclear organism, continue meropenem 1gm iv q12 hours (renally dosed) for now, de-escalate pending final sensitivities  - repeat surveillance cultures in 48 hours (11/30/18)  - tylenol prn fever

## 2018-12-01 LAB
-  AMIKACIN: SIGNIFICANT CHANGE UP
-  AZTREONAM: SIGNIFICANT CHANGE UP
-  CEFEPIME: SIGNIFICANT CHANGE UP
-  CEFTRIAXONE: SIGNIFICANT CHANGE UP
-  CIPROFLOXACIN: SIGNIFICANT CHANGE UP
-  GENTAMICIN: SIGNIFICANT CHANGE UP
-  LEVOFLOXACIN: SIGNIFICANT CHANGE UP
-  MEROPENEM: SIGNIFICANT CHANGE UP
-  PIPERACILLIN/TAZOBACTAM: SIGNIFICANT CHANGE UP
-  TOBRAMYCIN: SIGNIFICANT CHANGE UP
-  TRIMETHOPRIM/SULFAMETHOXAZOLE: SIGNIFICANT CHANGE UP
ANION GAP SERPL CALC-SCNC: 14 MMOL/L — SIGNIFICANT CHANGE UP (ref 5–17)
BASOPHILS # BLD AUTO: 0.03 K/UL — SIGNIFICANT CHANGE UP (ref 0–0.2)
BASOPHILS NFR BLD AUTO: 0.4 % — SIGNIFICANT CHANGE UP (ref 0–2)
BUN SERPL-MCNC: 29 MG/DL — HIGH (ref 7–23)
CALCIUM SERPL-MCNC: 8.3 MG/DL — LOW (ref 8.4–10.5)
CHLORIDE SERPL-SCNC: 104 MMOL/L — SIGNIFICANT CHANGE UP (ref 96–108)
CO2 SERPL-SCNC: 21 MMOL/L — LOW (ref 22–31)
CREAT SERPL-MCNC: 1.56 MG/DL — HIGH (ref 0.5–1.3)
CULTURE RESULTS: SIGNIFICANT CHANGE UP
CULTURE RESULTS: SIGNIFICANT CHANGE UP
EOSINOPHIL # BLD AUTO: 0 K/UL — SIGNIFICANT CHANGE UP (ref 0–0.5)
EOSINOPHIL NFR BLD AUTO: 0 % — SIGNIFICANT CHANGE UP (ref 0–6)
GLUCOSE SERPL-MCNC: 146 MG/DL — HIGH (ref 70–99)
HBA1C BLD-MCNC: 7.4 % — HIGH (ref 4–5.6)
HCT VFR BLD CALC: 35.2 % — LOW (ref 39–50)
HGB BLD-MCNC: 11.5 G/DL — LOW (ref 13–17)
IMM GRANULOCYTES NFR BLD AUTO: 4.9 % — HIGH (ref 0–1.5)
LYMPHOCYTES # BLD AUTO: 0.84 K/UL — LOW (ref 1–3.3)
LYMPHOCYTES # BLD AUTO: 10 % — LOW (ref 13–44)
MCHC RBC-ENTMCNC: 28.7 PG — SIGNIFICANT CHANGE UP (ref 27–34)
MCHC RBC-ENTMCNC: 32.7 GM/DL — SIGNIFICANT CHANGE UP (ref 32–36)
MCV RBC AUTO: 87.8 FL — SIGNIFICANT CHANGE UP (ref 80–100)
METHOD TYPE: SIGNIFICANT CHANGE UP
MONOCYTES # BLD AUTO: 0.63 K/UL — SIGNIFICANT CHANGE UP (ref 0–0.9)
MONOCYTES NFR BLD AUTO: 7.5 % — SIGNIFICANT CHANGE UP (ref 2–14)
NEUTROPHILS # BLD AUTO: 6.51 K/UL — SIGNIFICANT CHANGE UP (ref 1.8–7.4)
NEUTROPHILS NFR BLD AUTO: 77.2 % — HIGH (ref 43–77)
ORGANISM # SPEC MICROSCOPIC CNT: SIGNIFICANT CHANGE UP
PLATELET # BLD AUTO: 179 K/UL — SIGNIFICANT CHANGE UP (ref 150–400)
POTASSIUM SERPL-MCNC: 3.9 MMOL/L — SIGNIFICANT CHANGE UP (ref 3.5–5.3)
POTASSIUM SERPL-SCNC: 3.9 MMOL/L — SIGNIFICANT CHANGE UP (ref 3.5–5.3)
RBC # BLD: 4.01 M/UL — LOW (ref 4.2–5.8)
RBC # FLD: 16.9 % — HIGH (ref 10.3–14.5)
SODIUM SERPL-SCNC: 139 MMOL/L — SIGNIFICANT CHANGE UP (ref 135–145)
SPECIMEN SOURCE: SIGNIFICANT CHANGE UP
SPECIMEN SOURCE: SIGNIFICANT CHANGE UP
WBC # BLD: 8.42 K/UL — SIGNIFICANT CHANGE UP (ref 3.8–10.5)
WBC # FLD AUTO: 8.42 K/UL — SIGNIFICANT CHANGE UP (ref 3.8–10.5)

## 2018-12-01 PROCEDURE — 99232 SBSQ HOSP IP/OBS MODERATE 35: CPT

## 2018-12-01 PROCEDURE — 99233 SBSQ HOSP IP/OBS HIGH 50: CPT

## 2018-12-01 RX ADMIN — MEROPENEM 100 MILLIGRAM(S): 1 INJECTION INTRAVENOUS at 11:37

## 2018-12-01 RX ADMIN — Medication 20 MILLIGRAM(S): at 13:14

## 2018-12-01 RX ADMIN — MEROPENEM 100 MILLIGRAM(S): 1 INJECTION INTRAVENOUS at 22:49

## 2018-12-01 RX ADMIN — AMLODIPINE BESYLATE 10 MILLIGRAM(S): 2.5 TABLET ORAL at 06:17

## 2018-12-01 RX ADMIN — ATORVASTATIN CALCIUM 10 MILLIGRAM(S): 80 TABLET, FILM COATED ORAL at 22:50

## 2018-12-01 RX ADMIN — Medication 20 MILLIGRAM(S): at 06:17

## 2018-12-01 RX ADMIN — Medication 1: at 13:15

## 2018-12-01 RX ADMIN — Medication 1: at 17:54

## 2018-12-01 RX ADMIN — APIXABAN 2.5 MILLIGRAM(S): 2.5 TABLET, FILM COATED ORAL at 06:17

## 2018-12-01 RX ADMIN — APIXABAN 2.5 MILLIGRAM(S): 2.5 TABLET, FILM COATED ORAL at 17:54

## 2018-12-01 RX ADMIN — Medication 20 MILLIGRAM(S): at 22:50

## 2018-12-01 NOTE — PROGRESS NOTE ADULT - SUBJECTIVE AND OBJECTIVE BOX
Patient is a 88y old  Male who presents with a chief complaint of diarrhea x one day (30 Nov 2018 17:09)      SUBJECTIVE / OVERNIGHT EVENTS:  Patient feels well.  No fevers, chills, or diarrhea.  Non toxic appearing.    MEDICATIONS  (STANDING):  amLODIPine   Tablet 10 milliGRAM(s) Oral daily  apixaban 2.5 milliGRAM(s) Oral every 12 hours  atorvastatin 10 milliGRAM(s) Oral at bedtime  dextrose 5%. 1000 milliLiter(s) (50 mL/Hr) IV Continuous <Continuous>  dextrose 50% Injectable 12.5 Gram(s) IV Push once  dextrose 50% Injectable 25 Gram(s) IV Push once  dextrose 50% Injectable 25 Gram(s) IV Push once  insulin lispro (HumaLOG) corrective regimen sliding scale   SubCutaneous Before meals and at bedtime  lactated ringers. 1000 milliLiter(s) (75 mL/Hr) IV Continuous <Continuous>  lactated ringers. 1000 milliLiter(s) (100 mL/Hr) IV Continuous <Continuous>  meropenem  IVPB      meropenem  IVPB 1000 milliGRAM(s) IV Intermittent every 12 hours  predniSONE   Tablet 20 milliGRAM(s) Oral three times a day    MEDICATIONS  (PRN):  acetaminophen   Tablet .. 650 milliGRAM(s) Oral every 6 hours PRN Mild Pain (1 - 3)  dextrose 40% Gel 15 Gram(s) Oral once PRN Blood Glucose LESS THAN 70 milliGRAM(s)/deciliter  glucagon  Injectable 1 milliGRAM(s) IntraMuscular once PRN Glucose LESS THAN 70 milligrams/deciliter      CAPILLARY BLOOD GLUCOSE      POCT Blood Glucose.: 141 mg/dL (01 Dec 2018 08:34)  POCT Blood Glucose.: 110 mg/dL (30 Nov 2018 21:52)  POCT Blood Glucose.: 166 mg/dL (30 Nov 2018 17:36)  POCT Blood Glucose.: 218 mg/dL (30 Nov 2018 13:14)    I&O's Summary    30 Nov 2018 07:01  -  01 Dec 2018 07:00  --------------------------------------------------------  IN: 2855 mL / OUT: 600 mL / NET: 2255 mL    01 Dec 2018 07:01  -  01 Dec 2018 12:15  --------------------------------------------------------  IN: 120 mL / OUT: 0 mL / NET: 120 mL        PHYSICAL EXAM:  Vital Signs Last 24 Hrs  T(C): 36.3 (01 Dec 2018 11:06), Max: 36.3 (30 Nov 2018 19:12)  T(F): 97.3 (01 Dec 2018 11:06), Max: 97.3 (30 Nov 2018 19:12)  HR: 80 (01 Dec 2018 11:06) (80 - 90)  BP: 145/87 (01 Dec 2018 11:06) (141/83 - 153/88)  BP(mean): --  RR: 18 (01 Dec 2018 11:06) (18 - 18)  SpO2: 97% (01 Dec 2018 11:06) (96% - 97%)  GENERAL: NAD, well-developed  HEAD:  Atraumatic, Normocephalic  EYES: EOMI, PERRLA, conjunctiva and sclera clear  NECK: Supple, No JVD  CHEST/LUNG: Clear to auscultation bilaterally; No wheeze  HEART: Regular rate and rhythm; No murmurs, rubs, or gallops  ABDOMEN: Soft, Nontender, Nondistended; Bowel sounds present  EXTREMITIES:  2+ Peripheral Pulses, No clubbing, cyanosis, or edema  PSYCH: AAOx3  NEUROLOGY: non-focal  SKIN: No rashes or lesions    LABS:                        11.5   8.42  )-----------( 179      ( 01 Dec 2018 08:40 )             35.2     12-01    139  |  104  |  29<H>  ----------------------------<  146<H>  3.9   |  21<L>  |  1.56<H>    Ca    8.3<L>      01 Dec 2018 07:09  Mg     2.4     11-30                RADIOLOGY & ADDITIONAL TESTS:    Imaging Personally Reviewed:    Consultant(s) Notes Reviewed:      Care Discussed with Consultants/Other Providers:

## 2018-12-01 NOTE — PROGRESS NOTE ADULT - PROBLEM SELECTOR PLAN 4
First seen 8 years ago on EUS with Dr. Eliseo Vieyra was 2x1 cm benign features reported. Stable in size compared to in 6/2018. Discussed with radiology- appears benign. Guidelines recommend follow up contrast CT or MRCP w/wo contrast every 2 years x 4 years  - continue outpatient surveillance

## 2018-12-01 NOTE — PROGRESS NOTE ADULT - ATTENDING COMMENTS
Above note was authored by me attending physician
Positive blood cultures on broad spectrum meropenem and awaiting repeat blood cultures. He does not want to see GI in hospital. He has outpatient GI and will follow up upon discharge. Currently his BM is formed: x 2 today. He will continue with slow taper of prednisone as outpatient: prednisone 20mg twice a day.

## 2018-12-01 NOTE — PROGRESS NOTE ADULT - PROBLEM SELECTOR PLAN 2
- Improving  - Prednisone 20mg TID.  - Patient refusing GI eval.  - empiric meropenem as above, f/u ID recs  - Immunotherapy on hold for now.

## 2018-12-01 NOTE — PROGRESS NOTE ADULT - PROBLEM SELECTOR PLAN 1
- Febrile to 102.7 on admission, tachycardic in setting of diarrhea while on chronic     prednisone therapy.   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 L4 compression deformity shows no   other signs of inflammation/reaction to suggest abscess. Neither findings are     suspicious for infectious source.  - blood cultures resulted as sphingomonas paucimobilis  - ID recs appreciated. Final antibiotic recommendations will depend on speciation     and clearance of bacteremia.   - given allergies and unclear organism, continue meropenem 1gm iv q12 hours     (renally dosed) for now, de-escalate pending final sensitivities  - Repeat Blood cultures Q48 hours until clearance.   - tylenol prn fever

## 2018-12-02 LAB
ANION GAP SERPL CALC-SCNC: 13 MMOL/L — SIGNIFICANT CHANGE UP (ref 5–17)
BASOPHILS # BLD AUTO: 0 K/UL — SIGNIFICANT CHANGE UP (ref 0–0.2)
BASOPHILS NFR BLD AUTO: 0 % — SIGNIFICANT CHANGE UP (ref 0–2)
BUN SERPL-MCNC: 24 MG/DL — HIGH (ref 7–23)
CALCIUM SERPL-MCNC: 8.5 MG/DL — SIGNIFICANT CHANGE UP (ref 8.4–10.5)
CHLORIDE SERPL-SCNC: 105 MMOL/L — SIGNIFICANT CHANGE UP (ref 96–108)
CO2 SERPL-SCNC: 24 MMOL/L — SIGNIFICANT CHANGE UP (ref 22–31)
CREAT SERPL-MCNC: 1.42 MG/DL — HIGH (ref 0.5–1.3)
EOSINOPHIL # BLD AUTO: 0 K/UL — SIGNIFICANT CHANGE UP (ref 0–0.5)
EOSINOPHIL NFR BLD AUTO: 0 % — SIGNIFICANT CHANGE UP (ref 0–6)
GIANT PLATELETS BLD QL SMEAR: PRESENT — SIGNIFICANT CHANGE UP
GLUCOSE SERPL-MCNC: 155 MG/DL — HIGH (ref 70–99)
HCT VFR BLD CALC: 35.6 % — LOW (ref 39–50)
HGB BLD-MCNC: 11.2 G/DL — LOW (ref 13–17)
LYMPHOCYTES # BLD AUTO: 1.01 K/UL — SIGNIFICANT CHANGE UP (ref 1–3.3)
LYMPHOCYTES # BLD AUTO: 9 % — LOW (ref 13–44)
MANUAL SMEAR VERIFICATION: SIGNIFICANT CHANGE UP
MCHC RBC-ENTMCNC: 27.3 PG — SIGNIFICANT CHANGE UP (ref 27–34)
MCHC RBC-ENTMCNC: 31.5 GM/DL — LOW (ref 32–36)
MCV RBC AUTO: 86.6 FL — SIGNIFICANT CHANGE UP (ref 80–100)
METAMYELOCYTES # FLD: 2 % — HIGH (ref 0–0)
MONOCYTES # BLD AUTO: 0.67 K/UL — SIGNIFICANT CHANGE UP (ref 0–0.9)
MONOCYTES NFR BLD AUTO: 6 % — SIGNIFICANT CHANGE UP (ref 2–14)
MYELOCYTES NFR BLD: 2 % — HIGH (ref 0–0)
NEUTROPHILS # BLD AUTO: 9.05 K/UL — HIGH (ref 1.8–7.4)
NEUTROPHILS NFR BLD AUTO: 80 % — HIGH (ref 43–77)
NEUTS BAND # BLD: 1 % — SIGNIFICANT CHANGE UP (ref 0–8)
NEUTS HYPERSEG # BLD: PRESENT — SIGNIFICANT CHANGE UP
NRBC # BLD: 0 /100 — SIGNIFICANT CHANGE UP (ref 0–0)
PLAT MORPH BLD: ABNORMAL
PLATELET # BLD AUTO: 203 K/UL — SIGNIFICANT CHANGE UP (ref 150–400)
POTASSIUM SERPL-MCNC: 4.2 MMOL/L — SIGNIFICANT CHANGE UP (ref 3.5–5.3)
POTASSIUM SERPL-SCNC: 4.2 MMOL/L — SIGNIFICANT CHANGE UP (ref 3.5–5.3)
RBC # BLD: 4.11 M/UL — LOW (ref 4.2–5.8)
RBC # FLD: 17 % — HIGH (ref 10.3–14.5)
RBC BLD AUTO: SIGNIFICANT CHANGE UP
SODIUM SERPL-SCNC: 142 MMOL/L — SIGNIFICANT CHANGE UP (ref 135–145)
TOXIC GRANULES BLD QL SMEAR: PRESENT — SIGNIFICANT CHANGE UP
WBC # BLD: 11.17 K/UL — HIGH (ref 3.8–10.5)
WBC # FLD AUTO: 11.17 K/UL — HIGH (ref 3.8–10.5)

## 2018-12-02 PROCEDURE — 99232 SBSQ HOSP IP/OBS MODERATE 35: CPT

## 2018-12-02 RX ADMIN — APIXABAN 2.5 MILLIGRAM(S): 2.5 TABLET, FILM COATED ORAL at 05:26

## 2018-12-02 RX ADMIN — Medication 20 MILLIGRAM(S): at 14:12

## 2018-12-02 RX ADMIN — MEROPENEM 100 MILLIGRAM(S): 1 INJECTION INTRAVENOUS at 12:46

## 2018-12-02 RX ADMIN — Medication 1: at 08:55

## 2018-12-02 RX ADMIN — ATORVASTATIN CALCIUM 10 MILLIGRAM(S): 80 TABLET, FILM COATED ORAL at 22:51

## 2018-12-02 RX ADMIN — Medication 1: at 17:34

## 2018-12-02 RX ADMIN — AMLODIPINE BESYLATE 10 MILLIGRAM(S): 2.5 TABLET ORAL at 05:26

## 2018-12-02 RX ADMIN — Medication 20 MILLIGRAM(S): at 05:26

## 2018-12-02 RX ADMIN — MEROPENEM 100 MILLIGRAM(S): 1 INJECTION INTRAVENOUS at 22:52

## 2018-12-02 RX ADMIN — APIXABAN 2.5 MILLIGRAM(S): 2.5 TABLET, FILM COATED ORAL at 16:38

## 2018-12-02 RX ADMIN — Medication 20 MILLIGRAM(S): at 22:51

## 2018-12-02 NOTE — DIETITIAN INITIAL EVALUATION ADULT. - NS AS NUTRI INTERV ED CONTENT
Discussed the need to watch intake of sugars and sugary beverages, importance of protein and to continue use of probiotics under MD orders./Recommended modifications/Purpose of the nutrition education

## 2018-12-02 NOTE — PROGRESS NOTE ADULT - PROBLEM SELECTOR PLAN 1
- Febrile to 102.7 on admission, tachycardic in setting of diarrhea while on chronic     prednisone therapy.   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   - ID recs appreciated. Final antibiotic recommendations will depend on speciation     and clearance of bacteremia.   - given allergies and unclear organism, continue meropenem 1gm iv q12 hours     (renally dosed) for now, de-escalate pending final sensitivities  - Repeat Blood cultures Q48 hours until clearance.   - tylenol prn fever

## 2018-12-02 NOTE — DIETITIAN INITIAL EVALUATION ADULT. - PERTINENT MEDS FT
MEDICATIONS  (STANDING):  amLODIPine   Tablet 10 milliGRAM(s) Oral daily  apixaban 2.5 milliGRAM(s) Oral every 12 hours  atorvastatin 10 milliGRAM(s) Oral at bedtime  dextrose 5%. 1000 milliLiter(s) (50 mL/Hr) IV Continuous <Continuous>  dextrose 50% Injectable 12.5 Gram(s) IV Push once  dextrose 50% Injectable 25 Gram(s) IV Push once  dextrose 50% Injectable 25 Gram(s) IV Push once  insulin lispro (HumaLOG) corrective regimen sliding scale   SubCutaneous Before meals and at bedtime  lactated ringers. 1000 milliLiter(s) (75 mL/Hr) IV Continuous <Continuous>  lactated ringers. 1000 milliLiter(s) (100 mL/Hr) IV Continuous <Continuous>  meropenem  IVPB      meropenem  IVPB 1000 milliGRAM(s) IV Intermittent every 12 hours  predniSONE   Tablet 20 milliGRAM(s) Oral three times a day    MEDICATIONS  (PRN):  acetaminophen   Tablet .. 650 milliGRAM(s) Oral every 6 hours PRN Mild Pain (1 - 3)  dextrose 40% Gel 15 Gram(s) Oral once PRN Blood Glucose LESS THAN 70 milliGRAM(s)/deciliter  glucagon  Injectable 1 milliGRAM(s) IntraMuscular once PRN Glucose LESS THAN 70 milligrams/deciliter

## 2018-12-02 NOTE — PROGRESS NOTE ADULT - SUBJECTIVE AND OBJECTIVE BOX
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    Interval history:  Afebrile, repeat BCs negative  No acute events      ROS:  No cough, SOB, CP  No N/V/D./abd pain  No other complaints      Antimicrobials:    meropenem  IVPB      meropenem  IVPB 1000 milliGRAM(s) IV Intermittent every 12 hours      Vital Signs Last 24 Hrs  T(C): 36.3 (12-02-18 @ 09:56), Max: 36.3 (12-01-18 @ 11:06)  T(F): 97.4 (12-02-18 @ 09:56), Max: 97.4 (12-02-18 @ 09:56)  HR: 87 (12-02-18 @ 09:56) (74 - 87)  BP: 125/76 (12-02-18 @ 09:56) (125/76 - 166/90)  BP(mean): --  RR: 18 (12-02-18 @ 09:56) (18 - 18)  SpO2: 97% (12-02-18 @ 09:56) (97% - 97%)    Physical Exam:    Constitutional well preserved, NAD    HEENT PERRLA EOMI, No pallor or icterus    No oral exudate or erythema    Neck supple no JVD or LN    Chest Clear to auscultation    CVS S1 S2 WNl No murmur or rub or gallop    Abd soft BS normal No tenderness no masses    Ext No cyanosis clubbing or edema, RLE melenoma    IV site no erythema tenderness or discharge    Joints no swelling or LOM    CNS AAO X 3 non focal    Lab Data:                          11.5   8.42  )-----------( 179      ( 01 Dec 2018 08:40 )             35.2       12-02    142  |  105  |  24<H>  ----------------------------<  155<H>  4.2   |  24  |  1.42<H>    Ca    8.5      02 Dec 2018 06:23          .Blood Blood  12-01-18   No growth to date.  --  --      .Stool Feces  11-28-18   GI PCR Results: NOT detected  *******Please Note:*******  GI panel PCR evaluates for:  Campylobacter, Plesiomonas shigelloides, Salmonella,  Vibrio, Yersinia enterocolitica, Enteroaggregative  Escherichia coli (EAEC), Enteropathogenic E.coli (EPEC),  Enterotoxigenic E. coli (ETEC) lt/st, Shiga-like  toxin-producing E. coli (STEC) stx1/stx2,  Shigella/ Enteroinvasive E. coli (EIEC), Cryptosporidium,  Cyclospora cayetanensis, Entamoeba histolytica,  Giardia lamblia, Adenovirus F 40/41, Astrovirus,  Norovirus GI/GII, Rotavirus A, Sapovirus  --  --      .Urine Clean Catch (Midstream)  11-28-18   <10,000 CFU/ml Normal Urogenital becki present  --  --      .Blood Blood  11-28-18   Growth in aerobic bottle: Sphingomonas paucimobilis  --  Sphingomonas paucimobilis          Clostridium difficile GDH Interpretation: Negative for toxigenic C. Difficile.  This specimen is negative for C.  Difficile glutamate dehydrogenase (GDH) antigen and negative for C.  Difficile Toxins A & B, by EIA.  GDH is a highly sensitive screening  marker for C. Difficile that is produced in large amounts by all C.  Difficile strains, both toxigenic and nontoxigenic.  This assay has not  been validated as a test of cure.  Repeat testing during the same episode  of diarrhea is of limited value and is discouraged.  The results of this  assay should always be interpreted in conjunction with pateint's clinical  history. (11-28-18 @ 11:31)    < from: CT Abdomen and Pelvis No Cont (11.28.18 @ 04:32) >  EXAM:  CT ABDOMEN AND PELVIS                            PROCEDURE DATE:  11/28/2018            INTERPRETATION:  CLINICAL INFORMATION: Diarrhea with fever on   chemotherapy for metastatic melanoma.     COMPARISON: PET CT 6/7/2018.    PROCEDURE:   CTof the Abdomen and Pelvis was performed without intravenous contrast.   Intravenous contrast: None.  Oral contrast: None.  Sagittal and coronal reformats were performed.    FINDINGS:    LOWER CHEST: Coronary artery calcifications. Partially imaged pacemaker   leads. Bibasilar atelectasis.    Evaluation of the solid organs is limited without the administration of   intravenous contrast.    LIVER: Within normal limits.  BILE DUCTS: Normal caliber.  GALLBLADDER: Within normal limits.  SPLEEN: Withinnormal limits.  PANCREAS: Peripancreatic cystic lesion measures 4.3 x 1.8 cm (series 3   image 54), stable in size and appearance compared to the PET/CT 6/7/2018   when it was non-FDG avid. No pancreatic ductal dilatation.  ADRENALS: Within normal limits.  KIDNEYS/URETERS: Bilateral renal cysts and hypoattenuating lesions that   are too small to characterize. No hydronephrosis.    BLADDER: Within normal limits.  REPRODUCTIVE ORGANS: Radiation seeds within the prostate.    BOWEL: Small hiatal hernia. No bowel obstruction. Appendix is normal.  PERITONEUM: No ascites.  VESSELS:  Normal caliber abdominal aorta with atherosclerotic disease.  RETROPERITONEUM: No lymphadenopathy.    ABDOMINAL WALL: Left inguinal hernia containing fat and nonobstructed   large bowel.  BONES: Compression deformity of the L4 vertebral body with mild bony   retropulsion. Degenerative changes of the spine.    IMPRESSION:     No bowel obstruction or bowel wall thickening.    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   Differential includes peripancreatic fluid collection versus cystic   neoplasm. Further evaluation with MRCP with and without contrast can be   obtained.    Degenerative changes of the spine with compression deformity of the L4   vertebral body with mild bony retropulsion.    These findings were discussed with Dr. Bartolo Oates at 11/28/2018 9:11 AM   by Dr. Gonzalez with read back confirmation.                < end of copied text >

## 2018-12-02 NOTE — DIETITIAN INITIAL EVALUATION ADULT. - OTHER INFO
Patient referred for nutrition consult, A1c 7.4.   Patient found walking the hallways.  Feeling much better.  Eating well.  Patient very defensive when diabetes mentioned and firmly denied having DMs.  On regular diet.  simple diet teaching provided on the need to watch sugar intake related to the side effects of steroids.  Patient reports that he got sick in hospital with a bacterial infection related to the therapy he is getting for melanoma.  Diarrhea greatly improved and takes probiotics at home.  Supplements with B12, B2 and Vit D3.  Weight stable.

## 2018-12-02 NOTE — DIETITIAN INITIAL EVALUATION ADULT. - ENERGY NEEDS
HT 70 inches,  pounds, WT- 163.13 pounds, BMI 23.5.  pounds +/- 10%.  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   Skin intact.  No edema.  Generally well nourished.

## 2018-12-02 NOTE — PROGRESS NOTE ADULT - SUBJECTIVE AND OBJECTIVE BOX
Patient is a 88y old  Male who presents with a chief complaint of diarrhea x one day (02 Dec 2018 10:06)      SUBJECTIVE / OVERNIGHT EVENTS:  No acute events overnight.  Patient feels well.     MEDICATIONS  (STANDING):  amLODIPine   Tablet 10 milliGRAM(s) Oral daily  apixaban 2.5 milliGRAM(s) Oral every 12 hours  atorvastatin 10 milliGRAM(s) Oral at bedtime  dextrose 5%. 1000 milliLiter(s) (50 mL/Hr) IV Continuous <Continuous>  dextrose 50% Injectable 12.5 Gram(s) IV Push once  dextrose 50% Injectable 25 Gram(s) IV Push once  dextrose 50% Injectable 25 Gram(s) IV Push once  insulin lispro (HumaLOG) corrective regimen sliding scale   SubCutaneous Before meals and at bedtime  lactated ringers. 1000 milliLiter(s) (75 mL/Hr) IV Continuous <Continuous>  lactated ringers. 1000 milliLiter(s) (100 mL/Hr) IV Continuous <Continuous>  meropenem  IVPB      meropenem  IVPB 1000 milliGRAM(s) IV Intermittent every 12 hours  predniSONE   Tablet 20 milliGRAM(s) Oral three times a day    MEDICATIONS  (PRN):  acetaminophen   Tablet .. 650 milliGRAM(s) Oral every 6 hours PRN Mild Pain (1 - 3)  dextrose 40% Gel 15 Gram(s) Oral once PRN Blood Glucose LESS THAN 70 milliGRAM(s)/deciliter  glucagon  Injectable 1 milliGRAM(s) IntraMuscular once PRN Glucose LESS THAN 70 milligrams/deciliter      CAPILLARY BLOOD GLUCOSE      POCT Blood Glucose.: 156 mg/dL (02 Dec 2018 08:51)  POCT Blood Glucose.: 128 mg/dL (01 Dec 2018 22:02)  POCT Blood Glucose.: 171 mg/dL (01 Dec 2018 17:17)  POCT Blood Glucose.: 184 mg/dL (01 Dec 2018 13:11)    I&O's Summary    01 Dec 2018 07:01  -  02 Dec 2018 07:00  --------------------------------------------------------  IN: 1210 mL / OUT: 0 mL / NET: 1210 mL        PHYSICAL EXAM:  Vital Signs Last 24 Hrs  T(C): 36.3 (02 Dec 2018 09:56), Max: 36.3 (01 Dec 2018 20:07)  T(F): 97.4 (02 Dec 2018 09:56), Max: 97.4 (02 Dec 2018 09:56)  HR: 87 (02 Dec 2018 09:56) (74 - 87)  BP: 125/76 (02 Dec 2018 09:56) (125/76 - 166/90)  BP(mean): --  RR: 18 (02 Dec 2018 09:56) (18 - 18)  SpO2: 97% (02 Dec 2018 09:56) (97% - 97%)  GENERAL: NAD, well-developed  HEAD:  Atraumatic, Normocephalic  EYES: EOMI, PERRLA, conjunctiva and sclera clear  NECK: Supple, No JVD  CHEST/LUNG: Clear to auscultation bilaterally; No wheeze  HEART: Regular rate and rhythm; No murmurs, rubs, or gallops  ABDOMEN: Soft, Nontender, Nondistended; Bowel sounds present  EXTREMITIES:  2+ Peripheral Pulses, No clubbing, cyanosis, or edema  PSYCH: AAOx3  NEUROLOGY: non-focal  SKIN: No rashes or lesions    LABS:                        11.2   11.17 )-----------( 203      ( 02 Dec 2018 09:15 )             35.6     12-02    142  |  105  |  24<H>  ----------------------------<  155<H>  4.2   |  24  |  1.42<H>    Ca    8.5      02 Dec 2018 06:23                RADIOLOGY & ADDITIONAL TESTS:    Imaging Personally Reviewed:    Consultant(s) Notes Reviewed:      Care Discussed with Consultants/Other Providers:

## 2018-12-03 LAB
ANION GAP SERPL CALC-SCNC: 13 MMOL/L — SIGNIFICANT CHANGE UP (ref 5–17)
BUN SERPL-MCNC: 27 MG/DL — HIGH (ref 7–23)
CALCIUM SERPL-MCNC: 8.7 MG/DL — SIGNIFICANT CHANGE UP (ref 8.4–10.5)
CHLORIDE SERPL-SCNC: 103 MMOL/L — SIGNIFICANT CHANGE UP (ref 96–108)
CO2 SERPL-SCNC: 24 MMOL/L — SIGNIFICANT CHANGE UP (ref 22–31)
CREAT SERPL-MCNC: 1.34 MG/DL — HIGH (ref 0.5–1.3)
GLUCOSE SERPL-MCNC: 160 MG/DL — HIGH (ref 70–99)
HCT VFR BLD CALC: 36 % — LOW (ref 39–50)
HGB BLD-MCNC: 11.6 G/DL — LOW (ref 13–17)
MCHC RBC-ENTMCNC: 27.9 PG — SIGNIFICANT CHANGE UP (ref 27–34)
MCHC RBC-ENTMCNC: 32.2 GM/DL — SIGNIFICANT CHANGE UP (ref 32–36)
MCV RBC AUTO: 86.5 FL — SIGNIFICANT CHANGE UP (ref 80–100)
PLATELET # BLD AUTO: 221 K/UL — SIGNIFICANT CHANGE UP (ref 150–400)
POTASSIUM SERPL-MCNC: 4.7 MMOL/L — SIGNIFICANT CHANGE UP (ref 3.5–5.3)
POTASSIUM SERPL-SCNC: 4.7 MMOL/L — SIGNIFICANT CHANGE UP (ref 3.5–5.3)
RBC # BLD: 4.16 M/UL — LOW (ref 4.2–5.8)
RBC # FLD: 16.8 % — HIGH (ref 10.3–14.5)
SODIUM SERPL-SCNC: 140 MMOL/L — SIGNIFICANT CHANGE UP (ref 135–145)
WBC # BLD: 12.6 K/UL — HIGH (ref 3.8–10.5)
WBC # FLD AUTO: 12.6 K/UL — HIGH (ref 3.8–10.5)

## 2018-12-03 PROCEDURE — 99233 SBSQ HOSP IP/OBS HIGH 50: CPT

## 2018-12-03 PROCEDURE — 99232 SBSQ HOSP IP/OBS MODERATE 35: CPT

## 2018-12-03 PROCEDURE — 99231 SBSQ HOSP IP/OBS SF/LOW 25: CPT

## 2018-12-03 RX ORDER — TETANUS TOXOID, REDUCED DIPHTHERIA TOXOID AND ACELLULAR PERTUSSIS VACCINE, ADSORBED 5; 2.5; 8; 8; 2.5 [IU]/.5ML; [IU]/.5ML; UG/.5ML; UG/.5ML; UG/.5ML
0.5 SUSPENSION INTRAMUSCULAR ONCE
Qty: 0 | Refills: 0 | Status: COMPLETED | OUTPATIENT
Start: 2018-12-03 | End: 2018-12-03

## 2018-12-03 RX ORDER — BACITRACIN ZINC 500 UNIT/G
1 OINTMENT IN PACKET (EA) TOPICAL
Qty: 0 | Refills: 0 | Status: DISCONTINUED | OUTPATIENT
Start: 2018-12-03 | End: 2018-12-06

## 2018-12-03 RX ORDER — MEROPENEM 1 G/30ML
1000 INJECTION INTRAVENOUS
Qty: 28 | Refills: 0 | OUTPATIENT
Start: 2018-12-03 | End: 2018-12-16

## 2018-12-03 RX ADMIN — Medication 1 APPLICATION(S): at 14:48

## 2018-12-03 RX ADMIN — Medication 1 APPLICATION(S): at 05:38

## 2018-12-03 RX ADMIN — ATORVASTATIN CALCIUM 10 MILLIGRAM(S): 80 TABLET, FILM COATED ORAL at 21:27

## 2018-12-03 RX ADMIN — Medication 20 MILLIGRAM(S): at 13:09

## 2018-12-03 RX ADMIN — APIXABAN 2.5 MILLIGRAM(S): 2.5 TABLET, FILM COATED ORAL at 05:38

## 2018-12-03 RX ADMIN — Medication 20 MILLIGRAM(S): at 21:27

## 2018-12-03 RX ADMIN — Medication 20 MILLIGRAM(S): at 05:38

## 2018-12-03 RX ADMIN — MEROPENEM 100 MILLIGRAM(S): 1 INJECTION INTRAVENOUS at 11:32

## 2018-12-03 RX ADMIN — MEROPENEM 100 MILLIGRAM(S): 1 INJECTION INTRAVENOUS at 23:32

## 2018-12-03 RX ADMIN — TETANUS TOXOID, REDUCED DIPHTHERIA TOXOID AND ACELLULAR PERTUSSIS VACCINE, ADSORBED 0.5 MILLILITER(S): 5; 2.5; 8; 8; 2.5 SUSPENSION INTRAMUSCULAR at 00:33

## 2018-12-03 RX ADMIN — AMLODIPINE BESYLATE 10 MILLIGRAM(S): 2.5 TABLET ORAL at 05:38

## 2018-12-03 RX ADMIN — Medication 1: at 13:09

## 2018-12-03 RX ADMIN — Medication 2: at 17:32

## 2018-12-03 NOTE — PROGRESS NOTE ADULT - SUBJECTIVE AND OBJECTIVE BOX
CC: F/U for bacteremia    Saw/spoke to patient. Patient well. Asking to go home. No new complaints. Overall unchanged. No new complaints.    Allergies  ciprofloxacin (Unknown)  Levaquin (Unknown)  penicillin (Unknown)    ANTIMICROBIALS:  meropenem  IVPB    meropenem  IVPB 1000 every 12 hours    PE:    Vital Signs Last 24 Hrs  T(C): 36.8 (03 Dec 2018 14:09), Max: 36.8 (03 Dec 2018 14:09)  T(F): 98.2 (03 Dec 2018 14:09), Max: 98.2 (03 Dec 2018 14:09)  HR: 80 (03 Dec 2018 14:09) (80 - 84)  BP: 148/82 (03 Dec 2018 14:09) (140/89 - 160/85)  RR: 20 (03 Dec 2018 14:09) (17 - 20)  SpO2: 98% (03 Dec 2018 14:09) (97% - 98%)    Gen: AOx3, NAD, non-toxic, pleasant  CV: S1+S2 normal, nontachycardic  Resp: Clear bilat, no resp distress, no crackles/wheezes  Abd: Soft, nontender, +BS  Ext: No LE edema, no wounds    LABS:                        11.6   12.60 )-----------( 221      ( 03 Dec 2018 07:57 )             36.0     12-03    140  |  103  |  27<H>  ----------------------------<  160<H>  4.7   |  24  |  1.34<H>    Ca    8.7      03 Dec 2018 06:12    MICROBIOLOGY:    .Blood Blood  12-01-18   No growth to date.     .Stool Feces  11-28-18   GI PCR Results: NOT detected    .Urine Clean Catch (Midstream)  11-28-18   <10,000 CFU/ml Normal Urogenital becki present     .Blood Blood  11-28-18   Growth in aerobic bottle: Sphingomonas paucimobilis  --  Sphingomonas paucimobilis S Jimmy    Rapid RVP Result: NotDetec (11-27 @ 22:24)    Clostridium difficile GDH Toxins A&amp;B, EIA:   Negative (11-28-18 @ 11:31)    RADIOLOGY:    11/28 CT:    IMPRESSION:     No bowel obstruction or bowel wall thickening.    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   Differential includes peripancreatic fluid collection versus cystic   neoplasm. Further evaluation with MRCP with and without contrast can be   obtained.    Degenerative changes of the spine with compression deformity of the L4   vertebral body with mild bony retropulsion.

## 2018-12-03 NOTE — PROGRESS NOTE ADULT - PROBLEM SELECTOR PLAN 1
- Febrile to 102.7 on admission, tachycardic in setting of diarrhea while on chronic     prednisone therapy.   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   - sensitvities 11/28/18 : sensitive to eric, levaquin. Pt is however allergic to fluroquinolones  - f/up with ID for final antibiotic recommendations   -continue meropenem 1gm iv q12 hours (renally dosed) for now  - tylenol prn fever

## 2018-12-03 NOTE — PROGRESS NOTE ADULT - SUBJECTIVE AND OBJECTIVE BOX
Patient is a 88y old  Male who presents with a chief complaint of diarrhea x one day (02 Dec 2018 11:24)      SUBJECTIVE / OVERNIGHT EVENTS:    MEDICATIONS  (STANDING):  amLODIPine   Tablet 10 milliGRAM(s) Oral daily  apixaban 2.5 milliGRAM(s) Oral every 12 hours  atorvastatin 10 milliGRAM(s) Oral at bedtime  BACItracin   Ointment 1 Application(s) Topical two times a day  dextrose 5%. 1000 milliLiter(s) (50 mL/Hr) IV Continuous <Continuous>  dextrose 50% Injectable 12.5 Gram(s) IV Push once  dextrose 50% Injectable 25 Gram(s) IV Push once  dextrose 50% Injectable 25 Gram(s) IV Push once  insulin lispro (HumaLOG) corrective regimen sliding scale   SubCutaneous Before meals and at bedtime  lactated ringers. 1000 milliLiter(s) (75 mL/Hr) IV Continuous <Continuous>  lactated ringers. 1000 milliLiter(s) (100 mL/Hr) IV Continuous <Continuous>  meropenem  IVPB      meropenem  IVPB 1000 milliGRAM(s) IV Intermittent every 12 hours  predniSONE   Tablet 20 milliGRAM(s) Oral three times a day    MEDICATIONS  (PRN):  acetaminophen   Tablet .. 650 milliGRAM(s) Oral every 6 hours PRN Mild Pain (1 - 3)  dextrose 40% Gel 15 Gram(s) Oral once PRN Blood Glucose LESS THAN 70 milliGRAM(s)/deciliter  glucagon  Injectable 1 milliGRAM(s) IntraMuscular once PRN Glucose LESS THAN 70 milligrams/deciliter      Vital Signs Last 24 Hrs  T(C): 36.5 (03 Dec 2018 05:34), Max: 36.5 (03 Dec 2018 05:34)  T(F): 97.7 (03 Dec 2018 05:34), Max: 97.7 (03 Dec 2018 05:34)  HR: 84 (03 Dec 2018 05:34) (81 - 87)  BP: 160/85 (03 Dec 2018 05:34) (125/76 - 160/85)  BP(mean): --  RR: 17 (03 Dec 2018 05:34) (17 - 18)  SpO2: 98% (03 Dec 2018 05:34) (97% - 98%)  CAPILLARY BLOOD GLUCOSE      POCT Blood Glucose.: 147 mg/dL (03 Dec 2018 08:25)  POCT Blood Glucose.: 135 mg/dL (02 Dec 2018 22:13)  POCT Blood Glucose.: 174 mg/dL (02 Dec 2018 17:28)  POCT Blood Glucose.: 127 mg/dL (02 Dec 2018 12:28)    I&O's Summary    02 Dec 2018 07:01  -  03 Dec 2018 07:00  --------------------------------------------------------  IN: 1130 mL / OUT: 150 mL / NET: 980 mL        PHYSICAL EXAM:  GENERAL: NAD, well-developed  HEAD:  Atraumatic, Normocephalic  EYES: EOMI, PERRLA, conjunctiva and sclera clear  NECK: Supple, No JVD  CHEST/LUNG: Clear to auscultation bilaterally; No wheeze  HEART: Regular rate and rhythm; No murmurs, rubs, or gallops  ABDOMEN: Soft, Nontender, Nondistended; Bowel sounds present  EXTREMITIES:  2+ Peripheral Pulses, No clubbing, cyanosis, or edema  PSYCH: AAOx3  NEUROLOGY: non-focal  SKIN: No rashes or lesions    LABS:                        11.6   12.60 )-----------( 221      ( 03 Dec 2018 07:57 )             36.0     12-03    140  |  103  |  27<H>  ----------------------------<  160<H>  4.7   |  24  |  1.34<H>    Ca    8.7      03 Dec 2018 06:12                RADIOLOGY & ADDITIONAL TESTS:    Imaging Personally Reviewed:    Consultant(s) Notes Reviewed:      Care Discussed with Consultants/Other Providers: Patient is a 88y old  Male who presents with a chief complaint of diarrhea x one day (02 Dec 2018 11:24)      SUBJECTIVE / OVERNIGHT EVENTS:  Pt seen and examined. Sustained L hand wound from sink while  washing his hands this am. Otherwise no new c/o. Eager to be discharged.  Denies fever/chills, cough, abd pain, diarrhea.    MEDICATIONS  (STANDING):  amLODIPine   Tablet 10 milliGRAM(s) Oral daily  apixaban 2.5 milliGRAM(s) Oral every 12 hours  atorvastatin 10 milliGRAM(s) Oral at bedtime  BACItracin   Ointment 1 Application(s) Topical two times a day  dextrose 5%. 1000 milliLiter(s) (50 mL/Hr) IV Continuous <Continuous>  dextrose 50% Injectable 12.5 Gram(s) IV Push once  dextrose 50% Injectable 25 Gram(s) IV Push once  dextrose 50% Injectable 25 Gram(s) IV Push once  insulin lispro (HumaLOG) corrective regimen sliding scale   SubCutaneous Before meals and at bedtime  lactated ringers. 1000 milliLiter(s) (75 mL/Hr) IV Continuous <Continuous>  lactated ringers. 1000 milliLiter(s) (100 mL/Hr) IV Continuous <Continuous>  meropenem  IVPB      meropenem  IVPB 1000 milliGRAM(s) IV Intermittent every 12 hours  predniSONE   Tablet 20 milliGRAM(s) Oral three times a day    MEDICATIONS  (PRN):  acetaminophen   Tablet .. 650 milliGRAM(s) Oral every 6 hours PRN Mild Pain (1 - 3)  dextrose 40% Gel 15 Gram(s) Oral once PRN Blood Glucose LESS THAN 70 milliGRAM(s)/deciliter  glucagon  Injectable 1 milliGRAM(s) IntraMuscular once PRN Glucose LESS THAN 70 milligrams/deciliter      Vital Signs Last 24 Hrs  T(C): 36.5 (03 Dec 2018 05:34), Max: 36.5 (03 Dec 2018 05:34)  T(F): 97.7 (03 Dec 2018 05:34), Max: 97.7 (03 Dec 2018 05:34)  HR: 84 (03 Dec 2018 05:34) (81 - 87)  BP: 160/85 (03 Dec 2018 05:34) (125/76 - 160/85)  BP(mean): --  RR: 17 (03 Dec 2018 05:34) (17 - 18)  SpO2: 98% (03 Dec 2018 05:34) (97% - 98%)  CAPILLARY BLOOD GLUCOSE      POCT Blood Glucose.: 147 mg/dL (03 Dec 2018 08:25)  POCT Blood Glucose.: 135 mg/dL (02 Dec 2018 22:13)  POCT Blood Glucose.: 174 mg/dL (02 Dec 2018 17:28)  POCT Blood Glucose.: 127 mg/dL (02 Dec 2018 12:28)    I&O's Summary    02 Dec 2018 07:01  -  03 Dec 2018 07:00  --------------------------------------------------------  IN: 1130 mL / OUT: 150 mL / NET: 980 mL        PHYSICAL EXAM:  GENERAL: NAD, anicteric, afebrile  HEAD:  Atraumatic, Normocephalic  EYES: EOMI, PERRLA, conjunctiva and sclera clear  NECK: Supple, No JVD  CHEST/LUNG: Clear to auscultation bilaterally; No wheeze  HEART: Regular rate and rhythm; No murmurs, rubs, or gallops  ABDOMEN: Soft, Nontender, Nondistended; Bowel sounds present  EXTREMITIES: dressing on dorsum of left hand is CDI;  2+ Peripheral Pulses, No clubbing, cyanosis, or edema  PSYCH: AAOx3  NEUROLOGY: non-focal  SKIN: No rashes or lesions    LABS:                        11.6   12.60 )-----------( 221      ( 03 Dec 2018 07:57 )             36.0     12-03    140  |  103  |  27<H>  ----------------------------<  160<H>  4.7   |  24  |  1.34<H>    Ca    8.7      03 Dec 2018 06:12        BLD culture 12/1/18 : NGTD ( prelim)    Organism Identification: Sphingomonas paucimobilis (11.28.18 @ 00:19); sensitive to meropenem, levofloxacin, gentamycin, tobramycin, bactrim

## 2018-12-03 NOTE — CHART NOTE - NSCHARTNOTEFT_GEN_A_CORE
follow up- per ID meropenem for 2 weeks from neg cx-through 12/15/18  	needs picc line; consent for picc line in place.  	eliquis on hold pending picc- per iv team eliquis to be held for 48 hours  	d/w pt/Dari Saldana(NP)  [facility] [place], [phone]

## 2018-12-03 NOTE — PROGRESS NOTE ADULT - PROBLEM SELECTOR PLAN 2
- Improving  - Prednisone 20mg TID.  - empiric meropenem as above, f/u ID recs  - Immunotherapy on hold for now.

## 2018-12-03 NOTE — CHART NOTE - NSCHARTNOTEFT_GEN_A_CORE
Chief Complaint: L hand wound     NP Note (episodic)     HPI:  Called in by RN because pt c/o L hand wound from sink as he was washing his hands. Pt seen and examined, c/o minimal pain, just stinging. States he is left handed.   Denies numbness/tingling, extremity weakness. Pt does not remember his last tetanus vaccine.       REVIEW OF SYSTEMS:  CONSTITUTIONAL: No weakness, fevers or chills  EYES/ENT: No visual changes;  No vertigo or throat pain   NECK: No pain or stiffness  RESPIRATORY: No cough, wheezing, hemoptysis; No shortness of breath  CARDIOVASCULAR: No chest pain or palpitations  GASTROINTESTINAL: No abdominal or epigastric pain. No nausea, vomiting, or hematemesis; No diarrhea or constipation. No melena or hematochezia.  MUSCULOSKELETAL: No joint pain, stiffness, or swelling, Normal ROM  GENITOURINARY: No dysuria, frequency or hematuria  NEUROLOGICAL: No numbness or weakness  SKIN: + L hand wound   All other review of systems is negative unless indicated above.    PMH/PSH:  PAST MEDICAL & SURGICAL HISTORY:  Pacemaker  Melanoma  Atrial fibrillation  CAD (coronary artery disease)  DM (diabetes mellitus)  HTN (hypertension)  S/P CABG x 4        Allergies    ciprofloxacin (Unknown)  Levaquin (Unknown)  penicillin (Unknown)    Intolerances          Physical Exam:    Vital Signs Last 24 Hrs  T(C): 36.4 (02 Dec 2018 20:36), Max: 36.4 (02 Dec 2018 20:36)  T(F): 97.5 (02 Dec 2018 20:36), Max: 97.5 (02 Dec 2018 20:36)  HR: 81 (02 Dec 2018 20:36) (74 - 87)  BP: 140/89 (02 Dec 2018 20:41) (125/76 - 166/90)  BP(mean): --  RR: 81 (02 Dec 2018 20:36) (18 - 81)  SpO2: 97% (02 Dec 2018 20:36) (97% - 97%)    General:  NAD, AOx3, nontoxic appearing  Integumentary: L hand skin tear noted, superficial with no edema erythema ecchymosis or exudate. ~ 4 mm wide.  L hand NVI. No deformity. Slight oozing of blood.   Head:  NC/AT, no scleral injection, no JVD       Labs:                          11.2   11.17 )-----------( 203      ( 02 Dec 2018 09:15 )             35.6     12-02    142  |  105  |  24<H>  ----------------------------<  155<H>  4.2   |  24  |  1.42<H>    Ca    8.5      02 Dec 2018 06:23            Assessment & Plan:    88 M w/pmh  HTN HLD DM CAD s/p CABG , s/p PPM for bradycardia, afib on Eliquis , metastatic melanoma  on Opvido and Yervoy,  p/w   diarrhea, febrile 102 found to have gram negative jose alejandro bacteremia p/w L hand wound from hospital sink. Pt does not remember tetanus vaccine.     -Adacel x 1 dose  -Wound care administered. cleansed, clean dressing applied  -bacitracin BID   -continue to monitor     Follow up with Primary Team in AM.    Julianna Godinez NP [id number]

## 2018-12-04 LAB
ANION GAP SERPL CALC-SCNC: 14 MMOL/L — SIGNIFICANT CHANGE UP (ref 5–17)
BUN SERPL-MCNC: 30 MG/DL — HIGH (ref 7–23)
CALCIUM SERPL-MCNC: 8.8 MG/DL — SIGNIFICANT CHANGE UP (ref 8.4–10.5)
CALPROTECTIN STL-MCNT: 242 UG/G — HIGH (ref 0–120)
CHLORIDE SERPL-SCNC: 105 MMOL/L — SIGNIFICANT CHANGE UP (ref 96–108)
CO2 SERPL-SCNC: 25 MMOL/L — SIGNIFICANT CHANGE UP (ref 22–31)
CREAT SERPL-MCNC: 1.38 MG/DL — HIGH (ref 0.5–1.3)
GLUCOSE SERPL-MCNC: 158 MG/DL — HIGH (ref 70–99)
INR BLD: 1 RATIO — SIGNIFICANT CHANGE UP (ref 0.88–1.16)
LACTOFERRIN STL-MCNC: 66.8 — HIGH (ref 0–7.24)
POTASSIUM SERPL-MCNC: 4.7 MMOL/L — SIGNIFICANT CHANGE UP (ref 3.5–5.3)
POTASSIUM SERPL-SCNC: 4.7 MMOL/L — SIGNIFICANT CHANGE UP (ref 3.5–5.3)
PROTHROM AB SERPL-ACNC: 11.2 SEC — SIGNIFICANT CHANGE UP (ref 10–13.1)
SODIUM SERPL-SCNC: 144 MMOL/L — SIGNIFICANT CHANGE UP (ref 135–145)

## 2018-12-04 PROCEDURE — 99233 SBSQ HOSP IP/OBS HIGH 50: CPT

## 2018-12-04 PROCEDURE — 99232 SBSQ HOSP IP/OBS MODERATE 35: CPT

## 2018-12-04 RX ORDER — FUROSEMIDE 40 MG
20 TABLET ORAL
Qty: 0 | Refills: 0 | Status: DISCONTINUED | OUTPATIENT
Start: 2018-12-04 | End: 2018-12-05

## 2018-12-04 RX ADMIN — Medication 1 APPLICATION(S): at 17:37

## 2018-12-04 RX ADMIN — Medication 1: at 12:23

## 2018-12-04 RX ADMIN — Medication 2: at 17:38

## 2018-12-04 RX ADMIN — MEROPENEM 100 MILLIGRAM(S): 1 INJECTION INTRAVENOUS at 12:23

## 2018-12-04 RX ADMIN — Medication 20 MILLIGRAM(S): at 13:29

## 2018-12-04 RX ADMIN — Medication 20 MILLIGRAM(S): at 22:38

## 2018-12-04 RX ADMIN — Medication 2: at 22:38

## 2018-12-04 RX ADMIN — Medication 1 APPLICATION(S): at 06:08

## 2018-12-04 RX ADMIN — ATORVASTATIN CALCIUM 10 MILLIGRAM(S): 80 TABLET, FILM COATED ORAL at 22:38

## 2018-12-04 RX ADMIN — MEROPENEM 100 MILLIGRAM(S): 1 INJECTION INTRAVENOUS at 23:09

## 2018-12-04 RX ADMIN — Medication 20 MILLIGRAM(S): at 17:38

## 2018-12-04 RX ADMIN — Medication 20 MILLIGRAM(S): at 06:08

## 2018-12-04 RX ADMIN — AMLODIPINE BESYLATE 10 MILLIGRAM(S): 2.5 TABLET ORAL at 06:08

## 2018-12-04 NOTE — PROGRESS NOTE ADULT - PROBLEM SELECTOR PLAN 1
- Febrile to 102.7 on admission, tachycardic in setting of diarrhea while on chronic     prednisone therapy.   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   - sensitvities 11/28/18 : sensitive to eric, levaquin. Pt is however allergic to fluroquinolones  - Per ID will need to be d/c on Meropenem  - Eliquis on hold for PICC line placement    -continue meropenem 1gm iv q12 hours (renally dosed) for now  - tylenol prn fever

## 2018-12-04 NOTE — PROGRESS NOTE ADULT - SUBJECTIVE AND OBJECTIVE BOX
CC: F/U for Bacteremia    Saw/spoke to patient. Saw/spoke to patient. No fevers, no chills. Overall well. No new complaints. Awaiting PICC.    Allergies  ciprofloxacin (Unknown)  Levaquin (Unknown)  penicillin (Unknown)    ANTIMICROBIALS:  meropenem  IVPB    meropenem  IVPB 1000 every 12 hours    PE:    Vital Signs Last 24 Hrs  T(C): 36.3 (04 Dec 2018 06:02), Max: 36.9 (03 Dec 2018 20:59)  T(F): 97.4 (04 Dec 2018 06:02), Max: 98.4 (03 Dec 2018 20:59)  HR: 88 (04 Dec 2018 06:02) (80 - 88)  BP: 137/88 (04 Dec 2018 06:02) (137/88 - 149/74)  RR: 18 (04 Dec 2018 06:02) (18 - 20)  SpO2: 98% (04 Dec 2018 06:02) (98% - 99%)    Gen: AOx3, NAD, non-toxic, pleasant  CV: S1+S2 normal, nontachycardic  Resp: Clear bilat, no resp distress, no crackles/wheezes  Abd: Soft, nontender, +BS  Ext: No LE edema, no wounds    LABS:                        11.6   12.60 )-----------( 221      ( 03 Dec 2018 07:57 )             36.0     12-04    144  |  105  |  30<H>  ----------------------------<  158<H>  4.7   |  25  |  1.38<H>    Ca    8.8      04 Dec 2018 06:30    MICROBIOLOGY:    .Blood Blood  12-01-18   No growth to date.    .Stool Feces  11-28-18   GI PCR Results: NOT detected    .Blood Blood  11-28-18   Growth in aerobic bottle: Sphingomonas paucimobilis  --  Sphingomonas paucimobilis    Rapid RVP Result: NotDetec (11-27 @ 22:24)    Clostridium difficile GDH Toxins A&amp;B, EIA:   Negative (11-28-18 @ 11:31)    (otherwise reviewed)    RADIOLOGY:    11/28 CT:    IMPRESSION:     No bowel obstruction or bowel wall thickening.    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   Differential includes peripancreatic fluid collection versus cystic   neoplasm. Further evaluation with MRCP with and without contrast can be   obtained.    Degenerative changes of the spine with compression deformity of the L4   vertebral body with mild bony retropulsion.

## 2018-12-04 NOTE — PROGRESS NOTE ADULT - SUBJECTIVE AND OBJECTIVE BOX
Patient is a 88y old  Male who presents with a chief complaint of diarrhea x one day (03 Dec 2018 09:44)      SUBJECTIVE / OVERNIGHT EVENTS:    MEDICATIONS  (STANDING):  amLODIPine   Tablet 10 milliGRAM(s) Oral daily  atorvastatin 10 milliGRAM(s) Oral at bedtime  BACItracin   Ointment 1 Application(s) Topical two times a day  dextrose 5%. 1000 milliLiter(s) (50 mL/Hr) IV Continuous <Continuous>  dextrose 50% Injectable 12.5 Gram(s) IV Push once  dextrose 50% Injectable 25 Gram(s) IV Push once  dextrose 50% Injectable 25 Gram(s) IV Push once  insulin lispro (HumaLOG) corrective regimen sliding scale   SubCutaneous Before meals and at bedtime  lactated ringers. 1000 milliLiter(s) (75 mL/Hr) IV Continuous <Continuous>  lactated ringers. 1000 milliLiter(s) (100 mL/Hr) IV Continuous <Continuous>  meropenem  IVPB      meropenem  IVPB 1000 milliGRAM(s) IV Intermittent every 12 hours  predniSONE   Tablet 20 milliGRAM(s) Oral three times a day    MEDICATIONS  (PRN):  acetaminophen   Tablet .. 650 milliGRAM(s) Oral every 6 hours PRN Mild Pain (1 - 3)  dextrose 40% Gel 15 Gram(s) Oral once PRN Blood Glucose LESS THAN 70 milliGRAM(s)/deciliter  glucagon  Injectable 1 milliGRAM(s) IntraMuscular once PRN Glucose LESS THAN 70 milligrams/deciliter      Vital Signs Last 24 Hrs  T(C): 36.3 (04 Dec 2018 06:02), Max: 36.9 (03 Dec 2018 20:59)  T(F): 97.4 (04 Dec 2018 06:02), Max: 98.4 (03 Dec 2018 20:59)  HR: 88 (04 Dec 2018 06:02) (80 - 88)  BP: 137/88 (04 Dec 2018 06:02) (137/88 - 149/74)  BP(mean): --  RR: 18 (04 Dec 2018 06:02) (18 - 20)  SpO2: 98% (04 Dec 2018 06:02) (98% - 99%)  CAPILLARY BLOOD GLUCOSE      POCT Blood Glucose.: 127 mg/dL (04 Dec 2018 08:29)  POCT Blood Glucose.: 136 mg/dL (03 Dec 2018 22:01)  POCT Blood Glucose.: 217 mg/dL (03 Dec 2018 17:21)  POCT Blood Glucose.: 162 mg/dL (03 Dec 2018 12:43)    I&O's Summary    03 Dec 2018 07:01  -  04 Dec 2018 07:00  --------------------------------------------------------  IN: 290 mL / OUT: 0 mL / NET: 290 mL        PHYSICAL EXAM:  GENERAL: NAD, well-developed  HEAD:  Atraumatic, Normocephalic  EYES: EOMI, PERRLA, conjunctiva and sclera clear  NECK: Supple, No JVD  CHEST/LUNG: Clear to auscultation bilaterally; No wheeze  HEART: Regular rate and rhythm; No murmurs, rubs, or gallops  ABDOMEN: Soft, Nontender, Nondistended; Bowel sounds present  EXTREMITIES:  2+ Peripheral Pulses, No clubbing, cyanosis, or edema  PSYCH: AAOx3  NEUROLOGY: non-focal  SKIN: No rashes or lesions    LABS:                        11.6   12.60 )-----------( 221      ( 03 Dec 2018 07:57 )             36.0     12-04    144  |  105  |  30<H>  ----------------------------<  158<H>  4.7   |  25  |  1.38<H>    Ca    8.8      04 Dec 2018 06:30      PT/INR - ( 04 Dec 2018 08:03 )   PT: 11.2 sec;   INR: 1.00 ratio                   RADIOLOGY & ADDITIONAL TESTS:    Imaging Personally Reviewed:    Consultant(s) Notes Reviewed:      Care Discussed with Consultants/Other Providers: Patient is a 88y old  Male who presents with a chief complaint of diarrhea x one day (03 Dec 2018 09:44)      SUBJECTIVE / OVERNIGHT EVENTS:  Pt seen and examined. No acute events overnight. Reports b/l LE swelling ( has not been on Lasix since admission due to JOSE MARIA on CKD). He denies fever/chills.    MEDICATIONS  (STANDING):  amLODIPine   Tablet 10 milliGRAM(s) Oral daily  atorvastatin 10 milliGRAM(s) Oral at bedtime  BACItracin   Ointment 1 Application(s) Topical two times a day  dextrose 5%. 1000 milliLiter(s) (50 mL/Hr) IV Continuous <Continuous>  dextrose 50% Injectable 12.5 Gram(s) IV Push once  dextrose 50% Injectable 25 Gram(s) IV Push once  dextrose 50% Injectable 25 Gram(s) IV Push once  insulin lispro (HumaLOG) corrective regimen sliding scale   SubCutaneous Before meals and at bedtime  lactated ringers. 1000 milliLiter(s) (75 mL/Hr) IV Continuous <Continuous>  lactated ringers. 1000 milliLiter(s) (100 mL/Hr) IV Continuous <Continuous>  meropenem  IVPB      meropenem  IVPB 1000 milliGRAM(s) IV Intermittent every 12 hours  predniSONE   Tablet 20 milliGRAM(s) Oral three times a day    MEDICATIONS  (PRN):  acetaminophen   Tablet .. 650 milliGRAM(s) Oral every 6 hours PRN Mild Pain (1 - 3)  dextrose 40% Gel 15 Gram(s) Oral once PRN Blood Glucose LESS THAN 70 milliGRAM(s)/deciliter  glucagon  Injectable 1 milliGRAM(s) IntraMuscular once PRN Glucose LESS THAN 70 milligrams/deciliter      Vital Signs Last 24 Hrs  T(C): 36.3 (04 Dec 2018 06:02), Max: 36.9 (03 Dec 2018 20:59)  T(F): 97.4 (04 Dec 2018 06:02), Max: 98.4 (03 Dec 2018 20:59)  HR: 88 (04 Dec 2018 06:02) (80 - 88)  BP: 137/88 (04 Dec 2018 06:02) (137/88 - 149/74)  BP(mean): --  RR: 18 (04 Dec 2018 06:02) (18 - 20)  SpO2: 98% (04 Dec 2018 06:02) (98% - 99%)  CAPILLARY BLOOD GLUCOSE      POCT Blood Glucose.: 127 mg/dL (04 Dec 2018 08:29)  POCT Blood Glucose.: 136 mg/dL (03 Dec 2018 22:01)  POCT Blood Glucose.: 217 mg/dL (03 Dec 2018 17:21)  POCT Blood Glucose.: 162 mg/dL (03 Dec 2018 12:43)    I&O's Summary    03 Dec 2018 07:01  -  04 Dec 2018 07:00  --------------------------------------------------------  IN: 290 mL / OUT: 0 mL / NET: 290 mL        PHYSICAL EXAM:  GENERAL: NAD, anicteric, afebrile  HEAD:  Atraumatic, Normocephalic  EYES: EOMI, PERRLA, conjunctiva and sclera clear  NECK: Supple, No JVD  CHEST/LUNG: Clear to auscultation bilaterally; No wheeze  HEART: Regular rate and rhythm; No murmurs, rubs, or gallops  ABDOMEN: Soft, Nontender, Nondistended; Bowel sounds present  EXTREMITIES: dressing on dorsum of left hand is CDI;  2+ Peripheral Pulses, No clubbing, cyanosis, or edema  PSYCH: AAOx3  NEUROLOGY: non-focal  SKIN: No rashes or lesions    LABS:                        11.6   12.60 )-----------( 221      ( 03 Dec 2018 07:57 )             36.0     12-04    144  |  105  |  30<H>  ----------------------------<  158<H>  4.7   |  25  |  1.38<H>    Ca    8.8      04 Dec 2018 06:30      PT/INR - ( 04 Dec 2018 08:03 )   PT: 11.2 sec;   INR: 1.00 ratio                   RADIOLOGY & ADDITIONAL TESTS:    Imaging Personally Reviewed:    Consultant(s) Notes Reviewed:      Care Discussed with Consultants/Other Providers:

## 2018-12-05 LAB
ANION GAP SERPL CALC-SCNC: 13 MMOL/L — SIGNIFICANT CHANGE UP (ref 5–17)
BUN SERPL-MCNC: 36 MG/DL — HIGH (ref 7–23)
CALCIUM SERPL-MCNC: 8.9 MG/DL — SIGNIFICANT CHANGE UP (ref 8.4–10.5)
CHLORIDE SERPL-SCNC: 103 MMOL/L — SIGNIFICANT CHANGE UP (ref 96–108)
CO2 SERPL-SCNC: 26 MMOL/L — SIGNIFICANT CHANGE UP (ref 22–31)
CREAT SERPL-MCNC: 1.65 MG/DL — HIGH (ref 0.5–1.3)
GLUCOSE SERPL-MCNC: 149 MG/DL — HIGH (ref 70–99)
HCT VFR BLD CALC: 39.1 % — SIGNIFICANT CHANGE UP (ref 39–50)
HGB BLD-MCNC: 12.7 G/DL — LOW (ref 13–17)
MCHC RBC-ENTMCNC: 28.7 PG — SIGNIFICANT CHANGE UP (ref 27–34)
MCHC RBC-ENTMCNC: 32.5 GM/DL — SIGNIFICANT CHANGE UP (ref 32–36)
MCV RBC AUTO: 88.3 FL — SIGNIFICANT CHANGE UP (ref 80–100)
PLATELET # BLD AUTO: 251 K/UL — SIGNIFICANT CHANGE UP (ref 150–400)
POTASSIUM SERPL-MCNC: 4.3 MMOL/L — SIGNIFICANT CHANGE UP (ref 3.5–5.3)
POTASSIUM SERPL-SCNC: 4.3 MMOL/L — SIGNIFICANT CHANGE UP (ref 3.5–5.3)
RBC # BLD: 4.43 M/UL — SIGNIFICANT CHANGE UP (ref 4.2–5.8)
RBC # FLD: 16.9 % — HIGH (ref 10.3–14.5)
SODIUM SERPL-SCNC: 142 MMOL/L — SIGNIFICANT CHANGE UP (ref 135–145)
WBC # BLD: 14.66 K/UL — HIGH (ref 3.8–10.5)
WBC # FLD AUTO: 14.66 K/UL — HIGH (ref 3.8–10.5)

## 2018-12-05 PROCEDURE — 99233 SBSQ HOSP IP/OBS HIGH 50: CPT

## 2018-12-05 PROCEDURE — 71045 X-RAY EXAM CHEST 1 VIEW: CPT | Mod: 26

## 2018-12-05 RX ORDER — APIXABAN 2.5 MG/1
2.5 TABLET, FILM COATED ORAL EVERY 12 HOURS
Qty: 0 | Refills: 0 | Status: DISCONTINUED | OUTPATIENT
Start: 2018-12-06 | End: 2018-12-06

## 2018-12-05 RX ADMIN — MEROPENEM 100 MILLIGRAM(S): 1 INJECTION INTRAVENOUS at 11:43

## 2018-12-05 RX ADMIN — Medication 20 MILLIGRAM(S): at 07:02

## 2018-12-05 RX ADMIN — Medication 1 APPLICATION(S): at 17:05

## 2018-12-05 RX ADMIN — Medication 1: at 13:08

## 2018-12-05 RX ADMIN — Medication 1 APPLICATION(S): at 06:56

## 2018-12-05 RX ADMIN — Medication 1: at 18:01

## 2018-12-05 RX ADMIN — ATORVASTATIN CALCIUM 10 MILLIGRAM(S): 80 TABLET, FILM COATED ORAL at 21:26

## 2018-12-05 RX ADMIN — Medication 20 MILLIGRAM(S): at 06:56

## 2018-12-05 RX ADMIN — Medication 20 MILLIGRAM(S): at 13:52

## 2018-12-05 RX ADMIN — Medication 2: at 22:30

## 2018-12-05 RX ADMIN — Medication 20 MILLIGRAM(S): at 21:26

## 2018-12-05 RX ADMIN — AMLODIPINE BESYLATE 10 MILLIGRAM(S): 2.5 TABLET ORAL at 06:56

## 2018-12-05 RX ADMIN — MEROPENEM 100 MILLIGRAM(S): 1 INJECTION INTRAVENOUS at 23:25

## 2018-12-05 NOTE — PROGRESS NOTE ADULT - PROBLEM SELECTOR PLAN 1
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   - sensitvities 11/28/18 : sensitive to eric, levaquin. Pt is however allergic to fluroquinolones  - Per ID will need to be d/c on Meropenem  - Eliquis on hold for PICC line placement  today  -continue meropenem 1gm iv q12 hours (renally dosed) for now  - tylenol prn fever

## 2018-12-05 NOTE — PROGRESS NOTE ADULT - SUBJECTIVE AND OBJECTIVE BOX
Patient is a 88y old  Male who presents with a chief complaint of diarrhea x one day (04 Dec 2018 09:43)      SUBJECTIVE / OVERNIGHT EVENTS:    MEDICATIONS  (STANDING):  amLODIPine   Tablet 10 milliGRAM(s) Oral daily  atorvastatin 10 milliGRAM(s) Oral at bedtime  BACItracin   Ointment 1 Application(s) Topical two times a day  dextrose 5%. 1000 milliLiter(s) (50 mL/Hr) IV Continuous <Continuous>  dextrose 50% Injectable 12.5 Gram(s) IV Push once  dextrose 50% Injectable 25 Gram(s) IV Push once  dextrose 50% Injectable 25 Gram(s) IV Push once  furosemide    Tablet 20 milliGRAM(s) Oral two times a day  insulin lispro (HumaLOG) corrective regimen sliding scale   SubCutaneous Before meals and at bedtime  lactated ringers. 1000 milliLiter(s) (75 mL/Hr) IV Continuous <Continuous>  lactated ringers. 1000 milliLiter(s) (100 mL/Hr) IV Continuous <Continuous>  meropenem  IVPB      meropenem  IVPB 1000 milliGRAM(s) IV Intermittent every 12 hours  predniSONE   Tablet 20 milliGRAM(s) Oral three times a day    MEDICATIONS  (PRN):  acetaminophen   Tablet .. 650 milliGRAM(s) Oral every 6 hours PRN Mild Pain (1 - 3)  dextrose 40% Gel 15 Gram(s) Oral once PRN Blood Glucose LESS THAN 70 milliGRAM(s)/deciliter  glucagon  Injectable 1 milliGRAM(s) IntraMuscular once PRN Glucose LESS THAN 70 milligrams/deciliter      Vital Signs Last 24 Hrs  T(C): 36.2 (04 Dec 2018 20:48), Max: 36.5 (04 Dec 2018 13:50)  T(F): 97.1 (04 Dec 2018 20:48), Max: 97.7 (04 Dec 2018 13:50)  HR: 80 (05 Dec 2018 06:49) (80 - 86)  BP: 144/77 (05 Dec 2018 06:49) (130/80 - 153/-)  BP(mean): --  RR: 18 (05 Dec 2018 06:49) (18 - 18)  SpO2: 97% (05 Dec 2018 06:49) (97% - 98%)  CAPILLARY BLOOD GLUCOSE      POCT Blood Glucose.: 139 mg/dL (05 Dec 2018 08:19)  POCT Blood Glucose.: 209 mg/dL (04 Dec 2018 21:59)  POCT Blood Glucose.: 240 mg/dL (04 Dec 2018 17:23)  POCT Blood Glucose.: 151 mg/dL (04 Dec 2018 12:15)    I&O's Summary    04 Dec 2018 07:01  -  05 Dec 2018 07:00  --------------------------------------------------------  IN: 240 mL / OUT: 800 mL / NET: -560 mL        PHYSICAL EXAM:  GENERAL: NAD, well-developed  HEAD:  Atraumatic, Normocephalic  EYES: EOMI, PERRLA, conjunctiva and sclera clear  NECK: Supple, No JVD  CHEST/LUNG: Clear to auscultation bilaterally; No wheeze  HEART: Regular rate and rhythm; No murmurs, rubs, or gallops  ABDOMEN: Soft, Nontender, Nondistended; Bowel sounds present  EXTREMITIES:  2+ Peripheral Pulses, No clubbing, cyanosis, or edema  PSYCH: AAOx3  NEUROLOGY: non-focal  SKIN: No rashes or lesions    LABS:                        12.7   14.66 )-----------( 251      ( 05 Dec 2018 08:07 )             39.1     12-05    142  |  103  |  36<H>  ----------------------------<  149<H>  4.3   |  26  |  1.65<H>    Ca    8.9      05 Dec 2018 07:07      PT/INR - ( 04 Dec 2018 08:03 )   PT: 11.2 sec;   INR: 1.00 ratio                   RADIOLOGY & ADDITIONAL TESTS:    Imaging Personally Reviewed:    Consultant(s) Notes Reviewed:      Care Discussed with Consultants/Other Providers: Patient is a 88y old  Male who presents with a chief complaint of diarrhea x one day (04 Dec 2018 09:43)      SUBJECTIVE / OVERNIGHT EVENTS:  Pt seen and examined. No acute events overnight. Denies fever/chills, diarrhea. Awaiting PICC line placement today.    MEDICATIONS  (STANDING):  amLODIPine   Tablet 10 milliGRAM(s) Oral daily  atorvastatin 10 milliGRAM(s) Oral at bedtime  BACItracin   Ointment 1 Application(s) Topical two times a day  dextrose 5%. 1000 milliLiter(s) (50 mL/Hr) IV Continuous <Continuous>  dextrose 50% Injectable 12.5 Gram(s) IV Push once  dextrose 50% Injectable 25 Gram(s) IV Push once  dextrose 50% Injectable 25 Gram(s) IV Push once  furosemide    Tablet 20 milliGRAM(s) Oral two times a day  insulin lispro (HumaLOG) corrective regimen sliding scale   SubCutaneous Before meals and at bedtime  lactated ringers. 1000 milliLiter(s) (75 mL/Hr) IV Continuous <Continuous>  lactated ringers. 1000 milliLiter(s) (100 mL/Hr) IV Continuous <Continuous>  meropenem  IVPB      meropenem  IVPB 1000 milliGRAM(s) IV Intermittent every 12 hours  predniSONE   Tablet 20 milliGRAM(s) Oral three times a day    MEDICATIONS  (PRN):  acetaminophen   Tablet .. 650 milliGRAM(s) Oral every 6 hours PRN Mild Pain (1 - 3)  dextrose 40% Gel 15 Gram(s) Oral once PRN Blood Glucose LESS THAN 70 milliGRAM(s)/deciliter  glucagon  Injectable 1 milliGRAM(s) IntraMuscular once PRN Glucose LESS THAN 70 milligrams/deciliter      Vital Signs Last 24 Hrs  T(C): 36.2 (04 Dec 2018 20:48), Max: 36.5 (04 Dec 2018 13:50)  T(F): 97.1 (04 Dec 2018 20:48), Max: 97.7 (04 Dec 2018 13:50)  HR: 80 (05 Dec 2018 06:49) (80 - 86)  BP: 144/77 (05 Dec 2018 06:49) (130/80 - 153/-)  BP(mean): --  RR: 18 (05 Dec 2018 06:49) (18 - 18)  SpO2: 97% (05 Dec 2018 06:49) (97% - 98%)  CAPILLARY BLOOD GLUCOSE      POCT Blood Glucose.: 139 mg/dL (05 Dec 2018 08:19)  POCT Blood Glucose.: 209 mg/dL (04 Dec 2018 21:59)  POCT Blood Glucose.: 240 mg/dL (04 Dec 2018 17:23)  POCT Blood Glucose.: 151 mg/dL (04 Dec 2018 12:15)    I&O's Summary    04 Dec 2018 07:01  -  05 Dec 2018 07:00  --------------------------------------------------------  IN: 240 mL / OUT: 800 mL / NET: -560 mL        PHYSICAL EXAM:  GENERAL: NAD, anicteric, afebrile  HEAD:  Atraumatic, Normocephalic  EYES: EOMI, PERRLA, conjunctiva and sclera clear  NECK: Supple, No JVD  CHEST/LUNG: Clear to auscultation bilaterally; No wheeze  HEART: Regular rate and rhythm; No murmurs, rubs, or gallops  ABDOMEN: Soft, Nontender, Nondistended; Bowel sounds present  EXTREMITIES: dressing on dorsum of left hand is CDI;  2+ Peripheral Pulses, No clubbing, cyanosis, or edema  PSYCH: AAOx3  NEUROLOGY: non-focal  SKIN: No rashes or lesions    LABS:                        12.7   14.66 )-----------( 251      ( 05 Dec 2018 08:07 )             39.1     12-05    142  |  103  |  36<H>  ----------------------------<  149<H>  4.3   |  26  |  1.65<H>    Ca    8.9      05 Dec 2018 07:07      PT/INR - ( 04 Dec 2018 08:03 )   PT: 11.2 sec;   INR: 1.00 ratio

## 2018-12-05 NOTE — PROGRESS NOTE ADULT - PROBLEM SELECTOR PLAN 10
- DVT ppx : Eliquis on hold for now
- ppx; on eliquis  - diet: regular  - dispo: pending clinical progress    Plan discussed with patient and floor NP.
- DVT ppx : Eliquis on hold for now
- ppx; on eliquis  - diet: regular  - dispo: pending clinical progress    Plan discussed with patient and floor NP.
- ppx; on eliquis  - diet: regular  - dispo: pending clinical progress    Plan discussed with patient and floor NP.

## 2018-12-06 VITALS
OXYGEN SATURATION: 98 % | TEMPERATURE: 98 F | SYSTOLIC BLOOD PRESSURE: 123 MMHG | HEART RATE: 70 BPM | DIASTOLIC BLOOD PRESSURE: 72 MMHG | RESPIRATION RATE: 18 BRPM

## 2018-12-06 LAB
ANION GAP SERPL CALC-SCNC: 13 MMOL/L — SIGNIFICANT CHANGE UP (ref 5–17)
BASOPHILS # BLD AUTO: 0 K/UL — SIGNIFICANT CHANGE UP (ref 0–0.2)
BASOPHILS NFR BLD AUTO: 0 % — SIGNIFICANT CHANGE UP (ref 0–2)
BUN SERPL-MCNC: 42 MG/DL — HIGH (ref 7–23)
CALCIUM SERPL-MCNC: 8.7 MG/DL — SIGNIFICANT CHANGE UP (ref 8.4–10.5)
CHLORIDE SERPL-SCNC: 103 MMOL/L — SIGNIFICANT CHANGE UP (ref 96–108)
CO2 SERPL-SCNC: 25 MMOL/L — SIGNIFICANT CHANGE UP (ref 22–31)
CREAT SERPL-MCNC: 1.5 MG/DL — HIGH (ref 0.5–1.3)
CULTURE RESULTS: SIGNIFICANT CHANGE UP
CULTURE RESULTS: SIGNIFICANT CHANGE UP
EOSINOPHIL # BLD AUTO: 0.1 K/UL — SIGNIFICANT CHANGE UP (ref 0–0.5)
EOSINOPHIL NFR BLD AUTO: 0.5 % — SIGNIFICANT CHANGE UP (ref 0–6)
GLUCOSE SERPL-MCNC: 153 MG/DL — HIGH (ref 70–99)
HCT VFR BLD CALC: 35.4 % — LOW (ref 39–50)
HGB BLD-MCNC: 12.4 G/DL — LOW (ref 13–17)
LYMPHOCYTES # BLD AUTO: 0.9 K/UL — LOW (ref 1–3.3)
LYMPHOCYTES # BLD AUTO: 5.9 % — LOW (ref 13–44)
MCHC RBC-ENTMCNC: 30.4 PG — SIGNIFICANT CHANGE UP (ref 27–34)
MCHC RBC-ENTMCNC: 35 GM/DL — SIGNIFICANT CHANGE UP (ref 32–36)
MCV RBC AUTO: 86.9 FL — SIGNIFICANT CHANGE UP (ref 80–100)
MONOCYTES # BLD AUTO: 0.7 K/UL — SIGNIFICANT CHANGE UP (ref 0–0.9)
MONOCYTES NFR BLD AUTO: 4.6 % — SIGNIFICANT CHANGE UP (ref 2–14)
NEUTROPHILS # BLD AUTO: 13.2 K/UL — HIGH (ref 1.8–7.4)
NEUTROPHILS NFR BLD AUTO: 89 % — HIGH (ref 43–77)
PLAT MORPH BLD: NORMAL — SIGNIFICANT CHANGE UP
PLATELET # BLD AUTO: 238 K/UL — SIGNIFICANT CHANGE UP (ref 150–400)
POTASSIUM SERPL-MCNC: 3.6 MMOL/L — SIGNIFICANT CHANGE UP (ref 3.5–5.3)
POTASSIUM SERPL-SCNC: 3.6 MMOL/L — SIGNIFICANT CHANGE UP (ref 3.5–5.3)
RBC # BLD: 4.08 M/UL — LOW (ref 4.2–5.8)
RBC # FLD: 16.1 % — HIGH (ref 10.3–14.5)
RBC BLD AUTO: SIGNIFICANT CHANGE UP
SODIUM SERPL-SCNC: 141 MMOL/L — SIGNIFICANT CHANGE UP (ref 135–145)
SPECIMEN SOURCE: SIGNIFICANT CHANGE UP
SPECIMEN SOURCE: SIGNIFICANT CHANGE UP
WBC # BLD: 14.8 K/UL — HIGH (ref 3.8–10.5)
WBC # FLD AUTO: 14.8 K/UL — HIGH (ref 3.8–10.5)

## 2018-12-06 PROCEDURE — 82330 ASSAY OF CALCIUM: CPT

## 2018-12-06 PROCEDURE — 82435 ASSAY OF BLOOD CHLORIDE: CPT

## 2018-12-06 PROCEDURE — 80053 COMPREHEN METABOLIC PANEL: CPT

## 2018-12-06 PROCEDURE — 83036 HEMOGLOBIN GLYCOSYLATED A1C: CPT

## 2018-12-06 PROCEDURE — 36569 INSJ PICC 5 YR+ W/O IMAGING: CPT

## 2018-12-06 PROCEDURE — 87040 BLOOD CULTURE FOR BACTERIA: CPT

## 2018-12-06 PROCEDURE — 96375 TX/PRO/DX INJ NEW DRUG ADDON: CPT

## 2018-12-06 PROCEDURE — 80048 BASIC METABOLIC PNL TOTAL CA: CPT

## 2018-12-06 PROCEDURE — 83735 ASSAY OF MAGNESIUM: CPT

## 2018-12-06 PROCEDURE — 93005 ELECTROCARDIOGRAM TRACING: CPT

## 2018-12-06 PROCEDURE — 87086 URINE CULTURE/COLONY COUNT: CPT

## 2018-12-06 PROCEDURE — 85730 THROMBOPLASTIN TIME PARTIAL: CPT

## 2018-12-06 PROCEDURE — 85014 HEMATOCRIT: CPT

## 2018-12-06 PROCEDURE — 99239 HOSP IP/OBS DSCHRG MGMT >30: CPT

## 2018-12-06 PROCEDURE — 84295 ASSAY OF SERUM SODIUM: CPT

## 2018-12-06 PROCEDURE — 85027 COMPLETE CBC AUTOMATED: CPT

## 2018-12-06 PROCEDURE — 83993 ASSAY FOR CALPROTECTIN FECAL: CPT

## 2018-12-06 PROCEDURE — 87186 SC STD MICRODIL/AGAR DIL: CPT

## 2018-12-06 PROCEDURE — 87581 M.PNEUMON DNA AMP PROBE: CPT

## 2018-12-06 PROCEDURE — 96374 THER/PROPH/DIAG INJ IV PUSH: CPT

## 2018-12-06 PROCEDURE — 74176 CT ABD & PELVIS W/O CONTRAST: CPT

## 2018-12-06 PROCEDURE — 84132 ASSAY OF SERUM POTASSIUM: CPT

## 2018-12-06 PROCEDURE — 82565 ASSAY OF CREATININE: CPT

## 2018-12-06 PROCEDURE — 81001 URINALYSIS AUTO W/SCOPE: CPT

## 2018-12-06 PROCEDURE — 84100 ASSAY OF PHOSPHORUS: CPT

## 2018-12-06 PROCEDURE — 82947 ASSAY GLUCOSE BLOOD QUANT: CPT

## 2018-12-06 PROCEDURE — 99285 EMERGENCY DEPT VISIT HI MDM: CPT | Mod: 25

## 2018-12-06 PROCEDURE — 87449 NOS EACH ORGANISM AG IA: CPT

## 2018-12-06 PROCEDURE — 87507 IADNA-DNA/RNA PROBE TQ 12-25: CPT

## 2018-12-06 PROCEDURE — 87150 DNA/RNA AMPLIFIED PROBE: CPT

## 2018-12-06 PROCEDURE — 82803 BLOOD GASES ANY COMBINATION: CPT

## 2018-12-06 PROCEDURE — 83631 LACTOFERRIN FECAL (QUANT): CPT

## 2018-12-06 PROCEDURE — 87798 DETECT AGENT NOS DNA AMP: CPT

## 2018-12-06 PROCEDURE — C1751: CPT

## 2018-12-06 PROCEDURE — 90715 TDAP VACCINE 7 YRS/> IM: CPT

## 2018-12-06 PROCEDURE — 87486 CHLMYD PNEUM DNA AMP PROBE: CPT

## 2018-12-06 PROCEDURE — 71045 X-RAY EXAM CHEST 1 VIEW: CPT

## 2018-12-06 PROCEDURE — 96376 TX/PRO/DX INJ SAME DRUG ADON: CPT

## 2018-12-06 PROCEDURE — 87324 CLOSTRIDIUM AG IA: CPT

## 2018-12-06 PROCEDURE — 87633 RESP VIRUS 12-25 TARGETS: CPT

## 2018-12-06 PROCEDURE — 82962 GLUCOSE BLOOD TEST: CPT

## 2018-12-06 PROCEDURE — 85610 PROTHROMBIN TIME: CPT

## 2018-12-06 PROCEDURE — 83605 ASSAY OF LACTIC ACID: CPT

## 2018-12-06 RX ORDER — MEROPENEM 1 G/30ML
1000 INJECTION INTRAVENOUS
Qty: 18 | Refills: 0 | OUTPATIENT
Start: 2018-12-06 | End: 2018-12-14

## 2018-12-06 RX ORDER — BACITRACIN ZINC 500 UNIT/G
1 OINTMENT IN PACKET (EA) TOPICAL
Qty: 0 | Refills: 0 | COMMUNITY
Start: 2018-12-06

## 2018-12-06 RX ORDER — AMLODIPINE BESYLATE 2.5 MG/1
1 TABLET ORAL
Qty: 0 | Refills: 0 | COMMUNITY
Start: 2018-12-06

## 2018-12-06 RX ORDER — ACETAMINOPHEN 500 MG
2 TABLET ORAL
Qty: 0 | Refills: 0 | COMMUNITY
Start: 2018-12-06

## 2018-12-06 RX ADMIN — MEROPENEM 100 MILLIGRAM(S): 1 INJECTION INTRAVENOUS at 10:22

## 2018-12-06 RX ADMIN — APIXABAN 2.5 MILLIGRAM(S): 2.5 TABLET, FILM COATED ORAL at 06:08

## 2018-12-06 RX ADMIN — AMLODIPINE BESYLATE 10 MILLIGRAM(S): 2.5 TABLET ORAL at 06:08

## 2018-12-06 RX ADMIN — MEROPENEM 100 MILLIGRAM(S): 1 INJECTION INTRAVENOUS at 15:51

## 2018-12-06 RX ADMIN — Medication 20 MILLIGRAM(S): at 13:15

## 2018-12-06 RX ADMIN — Medication 20 MILLIGRAM(S): at 06:08

## 2018-12-06 RX ADMIN — Medication 1 APPLICATION(S): at 06:08

## 2018-12-06 RX ADMIN — Medication 1: at 12:42

## 2018-12-06 NOTE — PROGRESS NOTE ADULT - PROBLEM SELECTOR PLAN 3
- Likely prerenal 2/2 presenting sepsis and dehydration from diarrhea.  - Improving.  - encourage PO intake to match output  - IVF discontinued.   - monitor strict I/O  - monitor Cr  - dose medications renally, avoid nephrotoxins
- Likely prerenal 2/2 presenting sepsis and dehydration from diarrhea. Improved   with IVF, Cr 1.5 today.  - encourage PO intake to match output  - Improving  - IVF discontinued.   - monitor strict I/O  - monitor Cr  - dose medications renally, avoid nephrotoxins
Likely prerenal 2/2 presenting sepsis and dehydration from diarrhea. Improved with IVF, Cr 1.6 today  - encourage PO intake to match output  - resume maintenance IVF 75cc/hr x 24 hours  - monitor strict I/O  - monitor Cr  - dose medications renally, avoid nephrotoxins
s/p repletion- now normalized  - may d/c telemetry  - monitor serum potassium
- Cr improving
- Cr trending up   - will d/c Lasix ; can restart after completing meropenem  - monitor Cr closely
- Likely prerenal 2/2 presenting sepsis and dehydration from diarrhea.  - Improving.  - encourage PO intake to match output  - IVF discontinued.   - monitor strict I/O  - monitor Cr  - dose medications renally, avoid nephrotoxins
- Cr much improved   - will restart Lasix 20mg bid for b/l LE edema  - monitor Cr closely
3.3 today- replete to k>3.5  -continue to monitor serum potassium

## 2018-12-06 NOTE — PROGRESS NOTE ADULT - PROVIDER SPECIALTY LIST ADULT
Heme/Onc
Heme/Onc
Hospitalist
Infectious Disease
Hospitalist
Hospitalist

## 2018-12-06 NOTE — PROGRESS NOTE ADULT - PROBLEM SELECTOR PROBLEM 3
JOSE MARIA (acute kidney injury)
Hypokalemia
JOSE MARIA (acute kidney injury)
Hypokalemia

## 2018-12-06 NOTE — PROGRESS NOTE ADULT - PROBLEM SELECTOR PROBLEM 1
Sepsis, due to unspecified organism

## 2018-12-06 NOTE — PROGRESS NOTE ADULT - REASON FOR ADMISSION
diarrhea x one day

## 2018-12-06 NOTE — PROGRESS NOTE ADULT - ASSESSMENT
87 yo M hx HTN, HLD, DM, CAD s/p CABG , s/p PPM for bradycardia, afib on Eliquis , metastatic melanoma s/p excision and skin grafts currently  on Opvido and Yervoy, presents with diarrhea.  No fevers, no chills  Diarrhea improving  Otherwise no complaints to suggest source  BCX with Sphingomonas S Jimmy  Patient immunocompromised on steroids, opvido, yervoy  Complete course therapy for bacteremia  Overall, GNR Bacteremia, diarrhea, immunocompromised  - Meropenem 1g q 12 through 12/14/18  - Can place PICC when BCX neg x 48 hours, no fevers, no other signs sepsis  - Would check CBC/CMP weekly while on IV abx; fax to [phone]  - Discussed with medicine NP    Cuba Lopez MD  Pager [phone]  After 5pm and on weekends call [phone]
88 M w/pmh  HTN HLD DM CAD s/p CABG , s/p PPM for bradycardia, afib on Eliquis , metastatic melanoma  on Opvido and Yervoy,  p/w   diarrhea, febrile 102 found to have gram negative jose alejandro bacteremia
88 M w/pmh  HTN HLD DM CAD s/p CABG , s/p PPM for bradycardia, afib on Eliquis , metastatic melanoma  on Opvido and Yervoy,  p/w   diarrhea, febrile 102 found to have gram negative jose alejandro bacteremia
88 M w/pmh  HTN HLD DM CAD s/p CABG , s/p PPM for bradycardia, afib on Eliquis , metastatic melanoma  on Opvido and Yervoy,  p/w   diarrhea, febrile 102.
88M w/ PMH of HTN, HLD, DM, CAD s/p CABG, s/p PPM for bradycardia, AFib on Eliquis, prostate cancer (diagnosed 2001 s/p radiation seed implants by Dr. Bosworth), recurrent melanoma s/p excision and skin grafts who presents with acute on chronic diarrhea.     # Bacteremia: sphingomonas paucimobilis   - patient has been steroids and is high-risk for infection given immunosuppression  - currently on meropenem, will need transition to PO antibiotics for discharge, if possible    # Stage III melanoma  - First diagnosed in 2009 and 2014, resolved with surgical resection  - Now found to have recurrence in 5/2018 in right foot and right leg consistent with metastatic melanoma  - PET/CT  6/4/2018 no evidence of disease throughout  - Patient is s/p 2 cycles nivolumab 240 mg, s/p 1 cycle nivolumab 480 mg, s/p 2 cycles of ipilimumab/nivolumab; currently on surveillance.   - He is due for an outpatient PET scan in 2 weeks, can follow-up with Dr. Amor as an outpatient     # Immunotherapy-induced diarrhea  - improving, states that he had 2 formed bowel movements today  - remains on prednisone 20mg TID, would consider tapering down given bacteremia, however will need to be done slowly   - discussed possible GI consult, however patient refused       Ginger Ford MD  Hematology/Oncology Fellow, PGY-4  pager: [phone]  After 5pm or on weekends, please page the on-call fellow.
89 yo M hx HTN, HLD, DM, CAD s/p CABG , s/p PPM for bradycardia, afib on Eliquis , metastatic melanoma s/p excision and skin grafts currently  on Opvido and Yervoy, presents with diarrhea.  No fevers, no chills  Diarrhea improving  Otherwise no complaints to suggest source  BCX with Sphingomonas  Patient immunocompromised on steroids, opvido, yervoy  Overall, GNR Bacteremia, diarrhea, immunocompromised  - Meropenem 1g q 12  - Repeat BCX for clearance; BC from 12/1 negative at 24 hours, repeat after 48 hours if turne positive  - organism is sensitive to meropenem, continue IV therapy  - Final antibiotic plan depending on culture results  - Consider CT chest if worsening or any signs sepsis    Kofi Montejo MD  pager [phone]  office [phone]
89 yo M hx HTN, HLD, DM, CAD s/p CABG , s/p PPM for bradycardia, afib on Eliquis , metastatic melanoma s/p excision and skin grafts currently  on Opvido and Yervoy, presents with diarrhea.  No fevers, no chills  Diarrhea improving  Otherwise no complaints to suggest source  BCX with Sphingomonas S Jimmy  Patient immunocompromised on steroids, opvido, yervoy  Complete course therapy for bacteremia  Awaiting PICC but delayed due to anticoagulation  Overall, GNR Bacteremia, diarrhea, immunocompromised  - Meropenem 1g q 12 through 12/14/18  - Can place PICC when BCX neg x 48 hours, no fevers, no other signs sepsis  - Would check CBC/CMP weekly while on IV abx; fax to [phone]  - Follow up in ID clinic in 3 weeks  - Will sign off. Please call with further questions or change in status.    Cuba Lopez MD  Pager [phone]  After 5pm and on weekends call [phone]
This 88 year old male had malignant melanoma of the right leg with significant N2 c disease with at least four nodules in transit metastasis 4 months ago. He did not respond to 2 doses of IV nivolumab and he was given combined ipilimumab and nivolumab  as a treatment in month 2.There was significant flattening of the lesions after the first dose of treatment and immune therapy continued through October 2018. He began to experience immune related diarrhea in November 2018 and he has been on various dosage of prednisone: his last dose was 40 mg PO daily. He is readmitted with two episodes of diarrhea three days ago. This was proceeded by carlos segundo. He did not have fever (he was on prednisone) until his referral to the ER. I had sen him earlier in the day and I had advised the patient and his wife to go to the ER if the chills persisted. He had a normal blood pressure and temperature at [facility]. Since admission he has a gram negative bacteremia and the organism is a rare gram negative. He is without fever today and he tells me he feels "great " and wishes to go home. Please document clearence of bacteremia before considering home IV catheter placement and home antibiotic therapy possibly next week. I will follow him as an outpatient.  The course of the patient's illness has demonstrated what has been seen in other patients with complete response to immune therapy: that there is usually a co existing significant side effect such as diarrhea or endocrine disturbance.
88 M w/pmh  HTN HLD DM CAD s/p CABG , s/p PPM for bradycardia, afib on Eliquis , metastatic melanoma  on Opvido and Yervoy,  p/w   diarrhea, febrile 102 found to have gram negative jose alejandro bacteremia

## 2018-12-06 NOTE — PROGRESS NOTE ADULT - SUBJECTIVE AND OBJECTIVE BOX
Patient is a 88y old  Male who presents with a chief complaint of diarrhea x one day (05 Dec 2018 10:06)      SUBJECTIVE / OVERNIGHT EVENTS:    MEDICATIONS  (STANDING):  amLODIPine   Tablet 10 milliGRAM(s) Oral daily  apixaban 2.5 milliGRAM(s) Oral every 12 hours  atorvastatin 10 milliGRAM(s) Oral at bedtime  BACItracin   Ointment 1 Application(s) Topical two times a day  dextrose 5%. 1000 milliLiter(s) (50 mL/Hr) IV Continuous <Continuous>  dextrose 50% Injectable 12.5 Gram(s) IV Push once  dextrose 50% Injectable 25 Gram(s) IV Push once  dextrose 50% Injectable 25 Gram(s) IV Push once  insulin lispro (HumaLOG) corrective regimen sliding scale   SubCutaneous Before meals and at bedtime  lactated ringers. 1000 milliLiter(s) (75 mL/Hr) IV Continuous <Continuous>  lactated ringers. 1000 milliLiter(s) (100 mL/Hr) IV Continuous <Continuous>  meropenem  IVPB      meropenem  IVPB 1000 milliGRAM(s) IV Intermittent every 12 hours  predniSONE   Tablet 20 milliGRAM(s) Oral three times a day    MEDICATIONS  (PRN):  acetaminophen   Tablet .. 650 milliGRAM(s) Oral every 6 hours PRN Mild Pain (1 - 3)  dextrose 40% Gel 15 Gram(s) Oral once PRN Blood Glucose LESS THAN 70 milliGRAM(s)/deciliter  glucagon  Injectable 1 milliGRAM(s) IntraMuscular once PRN Glucose LESS THAN 70 milligrams/deciliter      Vital Signs Last 24 Hrs  T(C): 36.3 (06 Dec 2018 04:43), Max: 36.4 (05 Dec 2018 14:03)  T(F): 97.3 (06 Dec 2018 04:43), Max: 97.5 (05 Dec 2018 14:03)  HR: 78 (06 Dec 2018 04:43) (78 - 90)  BP: 150/88 (06 Dec 2018 04:43) (129/78 - 150/88)  BP(mean): --  RR: 18 (06 Dec 2018 04:43) (18 - 18)  SpO2: 98% (06 Dec 2018 04:43) (98% - 99%)  CAPILLARY BLOOD GLUCOSE      POCT Blood Glucose.: 128 mg/dL (06 Dec 2018 09:00)  POCT Blood Glucose.: 204 mg/dL (05 Dec 2018 22:11)  POCT Blood Glucose.: 183 mg/dL (05 Dec 2018 17:36)  POCT Blood Glucose.: 184 mg/dL (05 Dec 2018 12:22)    I&O's Summary    05 Dec 2018 07:01  -  06 Dec 2018 07:00  --------------------------------------------------------  IN: 890 mL / OUT: 700 mL / NET: 190 mL        PHYSICAL EXAM:  GENERAL: NAD, well-developed  HEAD:  Atraumatic, Normocephalic  EYES: EOMI, PERRLA, conjunctiva and sclera clear  NECK: Supple, No JVD  CHEST/LUNG: Clear to auscultation bilaterally; No wheeze  HEART: Regular rate and rhythm; No murmurs, rubs, or gallops  ABDOMEN: Soft, Nontender, Nondistended; Bowel sounds present  EXTREMITIES:  2+ Peripheral Pulses, No clubbing, cyanosis, or edema  PSYCH: AAOx3  NEUROLOGY: non-focal  SKIN: No rashes or lesions    LABS:                        12.4   14.8  )-----------( 238      ( 06 Dec 2018 06:40 )             35.4     12-06    141  |  103  |  42<H>  ----------------------------<  153<H>  3.6   |  25  |  1.50<H>    Ca    8.7      06 Dec 2018 06:40                RADIOLOGY & ADDITIONAL TESTS:    Imaging Personally Reviewed:    Consultant(s) Notes Reviewed:      Care Discussed with Consultants/Other Providers: Patient is a 88y old  Male who presents with a chief complaint of diarrhea x one day (05 Dec 2018 10:06)      SUBJECTIVE / OVERNIGHT EVENTS:  Pt seen and examined. No acute events overnight. Pt reports no new c/o today. He is s/p PICC line placed yesterday. Procedure was well tolerated.    MEDICATIONS  (STANDING):  amLODIPine   Tablet 10 milliGRAM(s) Oral daily  apixaban 2.5 milliGRAM(s) Oral every 12 hours  atorvastatin 10 milliGRAM(s) Oral at bedtime  BACItracin   Ointment 1 Application(s) Topical two times a day  dextrose 5%. 1000 milliLiter(s) (50 mL/Hr) IV Continuous <Continuous>  dextrose 50% Injectable 12.5 Gram(s) IV Push once  dextrose 50% Injectable 25 Gram(s) IV Push once  dextrose 50% Injectable 25 Gram(s) IV Push once  insulin lispro (HumaLOG) corrective regimen sliding scale   SubCutaneous Before meals and at bedtime  lactated ringers. 1000 milliLiter(s) (75 mL/Hr) IV Continuous <Continuous>  lactated ringers. 1000 milliLiter(s) (100 mL/Hr) IV Continuous <Continuous>  meropenem  IVPB      meropenem  IVPB 1000 milliGRAM(s) IV Intermittent every 12 hours  predniSONE   Tablet 20 milliGRAM(s) Oral three times a day    MEDICATIONS  (PRN):  acetaminophen   Tablet .. 650 milliGRAM(s) Oral every 6 hours PRN Mild Pain (1 - 3)  dextrose 40% Gel 15 Gram(s) Oral once PRN Blood Glucose LESS THAN 70 milliGRAM(s)/deciliter  glucagon  Injectable 1 milliGRAM(s) IntraMuscular once PRN Glucose LESS THAN 70 milligrams/deciliter      Vital Signs Last 24 Hrs  T(C): 36.3 (06 Dec 2018 04:43), Max: 36.4 (05 Dec 2018 14:03)  T(F): 97.3 (06 Dec 2018 04:43), Max: 97.5 (05 Dec 2018 14:03)  HR: 78 (06 Dec 2018 04:43) (78 - 90)  BP: 150/88 (06 Dec 2018 04:43) (129/78 - 150/88)  BP(mean): --  RR: 18 (06 Dec 2018 04:43) (18 - 18)  SpO2: 98% (06 Dec 2018 04:43) (98% - 99%)  CAPILLARY BLOOD GLUCOSE      POCT Blood Glucose.: 128 mg/dL (06 Dec 2018 09:00)  POCT Blood Glucose.: 204 mg/dL (05 Dec 2018 22:11)  POCT Blood Glucose.: 183 mg/dL (05 Dec 2018 17:36)  POCT Blood Glucose.: 184 mg/dL (05 Dec 2018 12:22)    I&O's Summary    05 Dec 2018 07:01  -  06 Dec 2018 07:00  --------------------------------------------------------  IN: 890 mL / OUT: 700 mL / NET: 190 mL        PHYSICAL EXAM:  GENERAL: NAD, anicteric, afebrile  HEAD:  Atraumatic, Normocephalic  EYES: EOMI, PERRLA, conjunctiva and sclera clear  NECK: Supple, No JVD  CHEST/LUNG: Clear to auscultation bilaterally; No wheeze  HEART: Regular rate and rhythm; No murmurs, rubs, or gallops  ABDOMEN: Soft, Nontender, Nondistended; Bowel sounds present  EXTREMITIES: dressing on dorsum of left hand is CDI;  2+ Peripheral Pulses, No clubbing, cyanosis, or edema  PSYCH: AAOx3  NEUROLOGY: non-focal  SKIN: No rashes or lesions    LABS:                        12.4   14.8  )-----------( 238      ( 06 Dec 2018 06:40 )             35.4     12-06    141  |  103  |  42<H>  ----------------------------<  153<H>  3.6   |  25  |  1.50<H>    Ca    8.7      06 Dec 2018 06:40                RADIOLOGY & ADDITIONAL TESTS:    Imaging Personally Reviewed:    Consultant(s) Notes Reviewed:      Care Discussed with Consultants/Other Providers:

## 2018-12-06 NOTE — PROGRESS NOTE ADULT - PROBLEM SELECTOR PLAN 2
- resolved  - Prednisone 20mg TID.  - for d/c home on  meropenem today  - Immunotherapy on hold for now.

## 2018-12-06 NOTE — PROGRESS NOTE ADULT - PROBLEM SELECTOR PLAN 1
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   - sensitvities 11/28/18 : sensitive to eric, levaquin. Pt is however allergic to fluroquinolones  - s/p PICC line placement  yesterday  -for d/c home today on  meropenem 1gm iv q12 hours through 12/14/18  - Follow up in ID clinic in 3 weeks

## 2018-12-17 ENCOUNTER — INPATIENT (INPATIENT)
Facility: HOSPITAL | Age: 83
LOS: 1 days | DRG: 543 | End: 2018-12-19
Attending: INTERNAL MEDICINE | Admitting: HOSPITALIST
Payer: MEDICARE

## 2018-12-17 VITALS
WEIGHT: 158.07 LBS | TEMPERATURE: 98 F | SYSTOLIC BLOOD PRESSURE: 124 MMHG | DIASTOLIC BLOOD PRESSURE: 86 MMHG | OXYGEN SATURATION: 94 % | HEIGHT: 70 IN | RESPIRATION RATE: 18 BRPM | HEART RATE: 106 BPM

## 2018-12-17 DIAGNOSIS — N17.9 ACUTE KIDNEY FAILURE, UNSPECIFIED: ICD-10-CM

## 2018-12-17 DIAGNOSIS — Z95.1 PRESENCE OF AORTOCORONARY BYPASS GRAFT: Chronic | ICD-10-CM

## 2018-12-17 DIAGNOSIS — D72.829 ELEVATED WHITE BLOOD CELL COUNT, UNSPECIFIED: ICD-10-CM

## 2018-12-17 DIAGNOSIS — M48.56XA COLLAPSED VERTEBRA, NOT ELSEWHERE CLASSIFIED, LUMBAR REGION, INITIAL ENCOUNTER FOR FRACTURE: ICD-10-CM

## 2018-12-17 DIAGNOSIS — Z29.9 ENCOUNTER FOR PROPHYLACTIC MEASURES, UNSPECIFIED: ICD-10-CM

## 2018-12-17 PROBLEM — I25.10 ATHEROSCLEROTIC HEART DISEASE OF NATIVE CORONARY ARTERY WITHOUT ANGINA PECTORIS: Chronic | Status: ACTIVE | Noted: 2018-11-28

## 2018-12-17 PROBLEM — C43.9 MALIGNANT MELANOMA OF SKIN, UNSPECIFIED: Chronic | Status: ACTIVE | Noted: 2018-11-28

## 2018-12-17 PROBLEM — Z95.0 PRESENCE OF CARDIAC PACEMAKER: Chronic | Status: ACTIVE | Noted: 2018-11-28

## 2018-12-17 PROBLEM — I48.91 UNSPECIFIED ATRIAL FIBRILLATION: Chronic | Status: ACTIVE | Noted: 2018-11-28

## 2018-12-17 PROBLEM — E11.9 TYPE 2 DIABETES MELLITUS WITHOUT COMPLICATIONS: Chronic | Status: ACTIVE | Noted: 2018-11-28

## 2018-12-17 PROBLEM — I10 ESSENTIAL (PRIMARY) HYPERTENSION: Chronic | Status: ACTIVE | Noted: 2018-11-28

## 2018-12-17 LAB
ALBUMIN SERPL ELPH-MCNC: 2.6 G/DL — LOW (ref 3.3–5)
ALP SERPL-CCNC: 142 U/L — HIGH (ref 40–120)
ALT FLD-CCNC: 36 U/L — SIGNIFICANT CHANGE UP (ref 10–45)
ANION GAP SERPL CALC-SCNC: 16 MMOL/L — SIGNIFICANT CHANGE UP (ref 5–17)
AST SERPL-CCNC: 29 U/L — SIGNIFICANT CHANGE UP (ref 10–40)
BASOPHILS # BLD AUTO: 0 K/UL — SIGNIFICANT CHANGE UP (ref 0–0.2)
BILIRUB SERPL-MCNC: 0.6 MG/DL — SIGNIFICANT CHANGE UP (ref 0.2–1.2)
BUN SERPL-MCNC: 89 MG/DL — HIGH (ref 7–23)
C DIFF GDH STL QL: POSITIVE — SIGNIFICANT CHANGE UP
C DIFF GDH STL QL: SIGNIFICANT CHANGE UP
CALCIUM SERPL-MCNC: 9 MG/DL — SIGNIFICANT CHANGE UP (ref 8.4–10.5)
CHLORIDE SERPL-SCNC: 100 MMOL/L — SIGNIFICANT CHANGE UP (ref 96–108)
CO2 SERPL-SCNC: 21 MMOL/L — LOW (ref 22–31)
CREAT SERPL-MCNC: 3.79 MG/DL — HIGH (ref 0.5–1.3)
EOSINOPHIL # BLD AUTO: 0.1 K/UL — SIGNIFICANT CHANGE UP (ref 0–0.5)
GLUCOSE BLDC GLUCOMTR-MCNC: 143 MG/DL — HIGH (ref 70–99)
GLUCOSE SERPL-MCNC: 121 MG/DL — HIGH (ref 70–99)
HCT VFR BLD CALC: 45 % — SIGNIFICANT CHANGE UP (ref 39–50)
HGB BLD-MCNC: 14.8 G/DL — SIGNIFICANT CHANGE UP (ref 13–17)
LYMPHOCYTES # BLD AUTO: 1 K/UL — SIGNIFICANT CHANGE UP (ref 1–3.3)
LYMPHOCYTES # BLD AUTO: 3 % — LOW (ref 13–44)
MCHC RBC-ENTMCNC: 29.2 PG — SIGNIFICANT CHANGE UP (ref 27–34)
MCHC RBC-ENTMCNC: 33 GM/DL — SIGNIFICANT CHANGE UP (ref 32–36)
MCV RBC AUTO: 88.6 FL — SIGNIFICANT CHANGE UP (ref 80–100)
MONOCYTES # BLD AUTO: 0.5 K/UL — SIGNIFICANT CHANGE UP (ref 0–0.9)
MONOCYTES NFR BLD AUTO: 6 % — SIGNIFICANT CHANGE UP (ref 2–14)
NEUTROPHILS # BLD AUTO: 14.2 K/UL — HIGH (ref 1.8–7.4)
NEUTROPHILS NFR BLD AUTO: 82 % — HIGH (ref 43–77)
PLATELET # BLD AUTO: 164 K/UL — SIGNIFICANT CHANGE UP (ref 150–400)
POTASSIUM SERPL-MCNC: 5.2 MMOL/L — SIGNIFICANT CHANGE UP (ref 3.5–5.3)
POTASSIUM SERPL-SCNC: 5.2 MMOL/L — SIGNIFICANT CHANGE UP (ref 3.5–5.3)
PROT SERPL-MCNC: 5.5 G/DL — LOW (ref 6–8.3)
RBC # BLD: 5.08 M/UL — SIGNIFICANT CHANGE UP (ref 4.2–5.8)
RBC # FLD: 17.3 % — HIGH (ref 10.3–14.5)
SODIUM SERPL-SCNC: 137 MMOL/L — SIGNIFICANT CHANGE UP (ref 135–145)
WBC # BLD: 15.8 K/UL — HIGH (ref 3.8–10.5)
WBC # FLD AUTO: 15.8 K/UL — HIGH (ref 3.8–10.5)

## 2018-12-17 PROCEDURE — 71045 X-RAY EXAM CHEST 1 VIEW: CPT | Mod: 26

## 2018-12-17 PROCEDURE — 72125 CT NECK SPINE W/O DYE: CPT | Mod: 26

## 2018-12-17 PROCEDURE — 72131 CT LUMBAR SPINE W/O DYE: CPT | Mod: 26

## 2018-12-17 PROCEDURE — 72128 CT CHEST SPINE W/O DYE: CPT | Mod: 26

## 2018-12-17 PROCEDURE — 99223 1ST HOSP IP/OBS HIGH 75: CPT | Mod: AI,GC

## 2018-12-17 PROCEDURE — 99285 EMERGENCY DEPT VISIT HI MDM: CPT | Mod: GC

## 2018-12-17 PROCEDURE — 72100 X-RAY EXAM L-S SPINE 2/3 VWS: CPT | Mod: 26

## 2018-12-17 RX ORDER — FUROSEMIDE 40 MG
20 TABLET ORAL
Qty: 0 | Refills: 0 | Status: DISCONTINUED | OUTPATIENT
Start: 2018-12-17 | End: 2018-12-17

## 2018-12-17 RX ORDER — SODIUM CHLORIDE 9 MG/ML
1000 INJECTION INTRAMUSCULAR; INTRAVENOUS; SUBCUTANEOUS
Qty: 0 | Refills: 0 | Status: DISCONTINUED | OUTPATIENT
Start: 2018-12-17 | End: 2018-12-18

## 2018-12-17 RX ORDER — CLOTRIMAZOLE 10 MG
1 TROCHE MUCOUS MEMBRANE
Qty: 0 | Refills: 0 | COMMUNITY

## 2018-12-17 RX ORDER — LIDOCAINE 4 G/100G
1 CREAM TOPICAL ONCE
Qty: 0 | Refills: 0 | Status: COMPLETED | OUTPATIENT
Start: 2018-12-17 | End: 2018-12-17

## 2018-12-17 RX ORDER — PREGABALIN 225 MG/1
1000 CAPSULE ORAL DAILY
Qty: 0 | Refills: 0 | Status: DISCONTINUED | OUTPATIENT
Start: 2018-12-17 | End: 2018-12-19

## 2018-12-17 RX ORDER — CHOLECALCIFEROL (VITAMIN D3) 125 MCG
1000 CAPSULE ORAL ONCE
Qty: 0 | Refills: 0 | Status: COMPLETED | OUTPATIENT
Start: 2018-12-17 | End: 2018-12-17

## 2018-12-17 RX ORDER — APIXABAN 2.5 MG/1
2.5 TABLET, FILM COATED ORAL EVERY 12 HOURS
Qty: 0 | Refills: 0 | Status: COMPLETED | OUTPATIENT
Start: 2018-12-17 | End: 2018-12-17

## 2018-12-17 RX ORDER — CHOLECALCIFEROL (VITAMIN D3) 125 MCG
1000 CAPSULE ORAL DAILY
Qty: 0 | Refills: 0 | Status: DISCONTINUED | OUTPATIENT
Start: 2018-12-17 | End: 2018-12-19

## 2018-12-17 RX ORDER — APIXABAN 2.5 MG/1
2.5 TABLET, FILM COATED ORAL EVERY 12 HOURS
Qty: 0 | Refills: 0 | Status: DISCONTINUED | OUTPATIENT
Start: 2018-12-17 | End: 2018-12-19

## 2018-12-17 RX ORDER — OXYCODONE HYDROCHLORIDE 5 MG/1
5 TABLET ORAL ONCE
Qty: 0 | Refills: 0 | Status: DISCONTINUED | OUTPATIENT
Start: 2018-12-17 | End: 2018-12-17

## 2018-12-17 RX ORDER — AMLODIPINE BESYLATE 2.5 MG/1
10 TABLET ORAL DAILY
Qty: 0 | Refills: 0 | Status: DISCONTINUED | OUTPATIENT
Start: 2018-12-17 | End: 2018-12-17

## 2018-12-17 RX ORDER — AMLODIPINE BESYLATE 2.5 MG/1
5 TABLET ORAL DAILY
Qty: 0 | Refills: 0 | Status: DISCONTINUED | OUTPATIENT
Start: 2018-12-17 | End: 2018-12-19

## 2018-12-17 RX ORDER — CLOTRIMAZOLE 10 MG
1 TROCHE MUCOUS MEMBRANE
Qty: 0 | Refills: 0 | Status: DISCONTINUED | OUTPATIENT
Start: 2018-12-17 | End: 2018-12-19

## 2018-12-17 RX ORDER — ACETAMINOPHEN 500 MG
650 TABLET ORAL EVERY 6 HOURS
Qty: 0 | Refills: 0 | Status: DISCONTINUED | OUTPATIENT
Start: 2018-12-17 | End: 2018-12-19

## 2018-12-17 RX ORDER — ACETAMINOPHEN 500 MG
1000 TABLET ORAL ONCE
Qty: 0 | Refills: 0 | Status: COMPLETED | OUTPATIENT
Start: 2018-12-17 | End: 2018-12-17

## 2018-12-17 RX ORDER — APIXABAN 2.5 MG/1
1 TABLET, FILM COATED ORAL
Qty: 0 | Refills: 0 | COMMUNITY

## 2018-12-17 RX ORDER — CALCIUM CARBONATE 500(1250)
1 TABLET ORAL DAILY
Qty: 0 | Refills: 0 | Status: DISCONTINUED | OUTPATIENT
Start: 2018-12-17 | End: 2018-12-19

## 2018-12-17 RX ORDER — BACITRACIN ZINC 500 UNIT/G
1 OINTMENT IN PACKET (EA) TOPICAL
Qty: 0 | Refills: 0 | Status: DISCONTINUED | OUTPATIENT
Start: 2018-12-17 | End: 2018-12-19

## 2018-12-17 RX ORDER — ATORVASTATIN CALCIUM 80 MG/1
10 TABLET, FILM COATED ORAL AT BEDTIME
Qty: 0 | Refills: 0 | Status: DISCONTINUED | OUTPATIENT
Start: 2018-12-17 | End: 2018-12-19

## 2018-12-17 RX ORDER — OXYCODONE HYDROCHLORIDE 5 MG/1
5 TABLET ORAL EVERY 4 HOURS
Qty: 0 | Refills: 0 | Status: DISCONTINUED | OUTPATIENT
Start: 2018-12-17 | End: 2018-12-19

## 2018-12-17 RX ORDER — FUROSEMIDE 40 MG
1 TABLET ORAL
Qty: 0 | Refills: 0 | COMMUNITY

## 2018-12-17 RX ADMIN — LIDOCAINE 1 PATCH: 4 CREAM TOPICAL at 07:30

## 2018-12-17 RX ADMIN — APIXABAN 2.5 MILLIGRAM(S): 2.5 TABLET, FILM COATED ORAL at 22:05

## 2018-12-17 RX ADMIN — OXYCODONE HYDROCHLORIDE 5 MILLIGRAM(S): 5 TABLET ORAL at 07:30

## 2018-12-17 RX ADMIN — LIDOCAINE 1 PATCH: 4 CREAM TOPICAL at 05:39

## 2018-12-17 RX ADMIN — Medication 1 LOZENGE: at 22:05

## 2018-12-17 RX ADMIN — AMLODIPINE BESYLATE 5 MILLIGRAM(S): 2.5 TABLET ORAL at 22:05

## 2018-12-17 RX ADMIN — Medication 1 APPLICATION(S): at 18:38

## 2018-12-17 RX ADMIN — ATORVASTATIN CALCIUM 10 MILLIGRAM(S): 80 TABLET, FILM COATED ORAL at 22:04

## 2018-12-17 RX ADMIN — APIXABAN 2.5 MILLIGRAM(S): 2.5 TABLET, FILM COATED ORAL at 18:15

## 2018-12-17 RX ADMIN — OXYCODONE HYDROCHLORIDE 5 MILLIGRAM(S): 5 TABLET ORAL at 05:39

## 2018-12-17 RX ADMIN — Medication 25 MILLIGRAM(S): at 22:04

## 2018-12-17 RX ADMIN — PREGABALIN 1000 MICROGRAM(S): 225 CAPSULE ORAL at 18:12

## 2018-12-17 RX ADMIN — Medication 1000 MILLIGRAM(S): at 07:30

## 2018-12-17 RX ADMIN — SODIUM CHLORIDE 75 MILLILITER(S): 9 INJECTION INTRAMUSCULAR; INTRAVENOUS; SUBCUTANEOUS at 18:39

## 2018-12-17 RX ADMIN — Medication 1000 UNIT(S): at 18:12

## 2018-12-17 RX ADMIN — Medication 1000 UNIT(S): at 22:04

## 2018-12-17 RX ADMIN — OXYCODONE HYDROCHLORIDE 5 MILLIGRAM(S): 5 TABLET ORAL at 22:36

## 2018-12-17 RX ADMIN — Medication 1000 MILLIGRAM(S): at 05:39

## 2018-12-17 RX ADMIN — OXYCODONE HYDROCHLORIDE 5 MILLIGRAM(S): 5 TABLET ORAL at 22:05

## 2018-12-17 RX ADMIN — LIDOCAINE 1 PATCH: 4 CREAM TOPICAL at 18:00

## 2018-12-17 RX ADMIN — AMLODIPINE BESYLATE 10 MILLIGRAM(S): 2.5 TABLET ORAL at 18:13

## 2018-12-17 NOTE — H&P ADULT - NSHPPHYSICALEXAM_GEN_ALL_CORE
Vital Signs Last 24 Hrs  T(C): 36.4 (17 Dec 2018 07:30), Max: 36.8 (17 Dec 2018 04:06)  T(F): 97.5 (17 Dec 2018 07:30), Max: 98.3 (17 Dec 2018 04:06)  HR: 92 (17 Dec 2018 07:30) (92 - 106)  BP: 142/85 (17 Dec 2018 07:30) (124/86 - 142/85)  RR: 18 (17 Dec 2018 07:30) (18 - 18)  SpO2: 96% (17 Dec 2018 07:30) (94% - 98%)    GENERAL: No acute distress, well-developed  HEAD: Atraumatic, Normocephalic  ENT: EOMI, PERRL, conjunctiva and sclera clear, moist mucosa no pharyngeal erythema or exudates   NECK: supple , no JVD   CHEST/LUNG: Clear to auscultation bilaterally; No wheeze, equal breath sounds bilaterally   BACK: No spinal tenderness,  No CVA tenderness. No tenderness to palpation of spine. Lidocaine patch in place.  HEART: Regular rate and rhythm; No murmurs, rubs, or gallops  ABDOMEN: Soft, Nontender, Nondistended; Bowel sounds present  MSK: No joint swelling or effusions.   PSYCH: Normal behavior/affect  NEUROLOGY: AAOx3, non-focal, cranial nerves grossly intact  SKIN: bilat lower extremities w/ trace edema s/p skin grafts b/l pigmented skin, hematomas, and weeping wounds. Vital Signs Last 24 Hrs  T(C): 36.4 (17 Dec 2018 07:30), Max: 36.8 (17 Dec 2018 04:06)  T(F): 97.5 (17 Dec 2018 07:30), Max: 98.3 (17 Dec 2018 04:06)  HR: 92 (17 Dec 2018 07:30) (92 - 106)  BP: 142/85 (17 Dec 2018 07:30) (124/86 - 142/85)  RR: 18 (17 Dec 2018 07:30) (18 - 18)  SpO2: 96% (17 Dec 2018 07:30) (94% - 98%)    GENERAL: No acute distress, well-developed  HEAD: Atraumatic, Normocephalic  ENT: EOMI, PERRL, conjunctiva and sclera clear, moist mucosa no pharyngeal erythema or exudates   NECK: supple , no JVD   CHEST/LUNG: Clear to auscultation bilaterally; No wheeze, equal breath sounds bilaterally   BACK: No spinal tenderness,  No CVA tenderness. No tenderness to palpation of spine. Lidocaine patch in place.  HEART: Regular rate and rhythm; No murmurs, rubs, or gallops  ABDOMEN: Soft, Nontender, Nondistended; Bowel sounds present  MSK: No joint swelling or effusions. ROM intact   Extremity . no clubbing, no cyanosis, b/l lower extremity pigmentation , some weeping ulcers   PSYCH: Normal behavior/affect  NEUROLOGY: AAOx3, non-focal, cranial nerves grossly intact. Motor in LE - limited exam but grossly 5/5 . UE - 5/5   SKIN: bilat lower extremities w/ trace edema s/p skin grafts b/l pigmented skin, hematomas, and weeping wounds.

## 2018-12-17 NOTE — H&P ADULT - PROBLEM SELECTOR PLAN 2
thought to be secondary to immunotherapy , however currently febrile   - prednisone   - check c diff panel   - stool PCR thought to be secondary to immunotherapy, currently afebrile   - c/w home prednisone 20 mg tid

## 2018-12-17 NOTE — H&P ADULT - HISTORY OF PRESENT ILLNESS
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    Pt reports he was last able to walk normally in early november prior to recent admission. On that admission, patient reported diarrhea consisted of  3-4 episodes of watery stool a day over the past month, symptoms were attributed  to the immunotherapy  and patient had been on prednisone  which improved his symptoms. During admission, pt had been found to have L4 compression fracture. Since then, he reports he had worsening lower back pain progressively limiting his ability to walk. He also reports diarrhea which had improved on last admission but has recently gotten worse, no BM's since yesterday. Reports starting new medication for thrush 4 days ago, possibly fluconazole.    ED vitals afebrile with , /85, RR 18, SpO2 94% on RA. Pt underwent CT of cervical, thoracic, and lumbar spine, which revealed prior and new compression fractures, specifically L4 appeared stable, T12 progression, and new compression fractures of T12, L1, & L2. Pt was given IV tylenol, oxy 5, and lidocaine patch. Currently reports back pain is minimal when he is not moving. Pt denies fever, chills, abd pain, nausea, vomiting, constipation, melana, hematochezia, lightheadedness. 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 He denied any numbness, tingling , focal weakness, urinary or faecal incontinence. he denied any h/o fever , chills or night sweat. He also stated  that at some his twisted his back.   Pt reports he was last able to walk normally in early november prior to recent admission. On that admission, patient reported diarrhea consisted of  3-4 episodes of watery stool a day over the past month, symptoms were attributed  to the immunotherapy  and patient had been on prednisone  which improved his symptoms. During admission, pt had been found to have L4 compression fracture. Since then, he reports he had worsening lower back pain progressively limiting his ability to walk. He also reports diarrhea which had improved on last admission but has recently gotten worse, no BM's since yesterday. Reports starting new medication for thrush 4 days ago, possibly fluconazole.    ED vitals afebrile with , /85, RR 18, SpO2 94% on RA. Pt underwent CT of cervical, thoracic, and lumbar spine, which revealed prior and new compression fractures, specifically L4 appeared stable, T12 progression, and new compression fractures of T12, L1, & L2. Pt was given IV tylenol, oxy 5, and lidocaine patch. Currently reports back pain is minimal when he is not moving. Pt denies fever, chills, abd pain, nausea, vomiting, constipation, melana, hematochezia, lightheadedness.

## 2018-12-17 NOTE — H&P ADULT - ATTENDING COMMENTS
I have seen and examined the patient. discussed with resident and team. labs vitals reviewed.   d/w patient agree with plan

## 2018-12-17 NOTE — H&P ADULT - PROBLEM SELECTOR PLAN 7
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 Started on nivolumab, then added ipilimumab, complicated by immune-related diarrhea on recent admission.  - outpt oncology follow up w Dr Oakley - c/w home elliquis 2.5 mg q12h

## 2018-12-17 NOTE — ED PROVIDER NOTE - PROGRESS NOTE DETAILS
Multiple compression fractures identified on CT: T9 (indeterminant), T12 (progression of prior fracture), T11 (acute), L1 (new/acute), L2 (new/acute) and L4 (stable).  No evidence of cord compression on CT.  Also noted on labs to have some acute on chronic JOSE MARIA.  Plan now for admission to medicine service for further management. -[place]

## 2018-12-17 NOTE — ED PROVIDER NOTE - MEDICAL DECISION MAKING DETAILS
89 y/o M w/ back pain and difficulty walking. Will give multimodal analgesia in effort to control pain and attempt to ambulate pt. Will get xray lumbar region to eval fx. Pt may require admission if he is unable to ambulate.

## 2018-12-17 NOTE — H&P ADULT - PROBLEM SELECTOR PLAN 5
- amlodipine - c/w home amlodipine 10 mg qD  - c/w home furosemide 20 mg BP well-controlled  - c/w home amlodipine 10 mg qD  - c/w home furosemide 20 mg CAD s/p CABG x3  in 2010, bradycardia s/p PPM in 2017  - c/w home atorvastatin 10 mg qD BP well-controlled  - c/w home amlodipine 10 mg qD BP well-controlled  - c/w amlodipine 5 mg qD

## 2018-12-17 NOTE — H&P ADULT - NSHPREVIEWOFSYSTEMS_GEN_ALL_CORE
CONSTITUTIONAL: No weakness, fevers or chills  EYES/ENT: No visual changes; No dysphagia  NECK: No pain or stiffness  RESPIRATORY: No cough, wheezing, hemoptysis; No shortness of breath  CARDIOVASCULAR: No chest pain or palpitations; No lower extremity edema  EXTREMITIES: skin changes as below. no leg edema, cyanosis, clubbing  GASTROINTESTINAL: No abdominal or epigastric pain. No nausea, vomiting, or hematemesis; + diarrhea. No constipation. No melena or hematochezia.  BACK: + back pain  GENITOURINARY: No dysuria, frequency or hematuria  NEUROLOGICAL: No numbness or weakness  MSK: no joint swelling or pain  SKIN: + bilat leg swelling & chronic skin darkening. No itching, burning, rashes  PSYCH: no agitation  All other review of systems is negative unless indicated above.

## 2018-12-17 NOTE — PROVIDER CONTACT NOTE (MEDICATION) - ACTION/TREATMENT ORDERED:
MD Rach Gaspar aware of situation, meds reordered as 1x doses, okay to give meds now. occurrence/ corrective action completed. RN manager Vincenzo and admin Darlene aware of situation

## 2018-12-17 NOTE — H&P ADULT - NSHPSOCIALHISTORY_GEN_ALL_CORE
Prior social smoker 1 pack per week quit tobacco in 1948  Social EtOH, denies drugs Prior social smoker 1 pack per week quit tobacco in 1948  Social EtOH, denies drugs  Reports wife is his HCP  Reports living in house with staircase which he is unable to climb i/s/o compression fractures

## 2018-12-17 NOTE — H&P ADULT - PROBLEM SELECTOR PLAN 8
- continue Eliquis - c/w home elliquis 2.5 mg q12h - DVT ppx: on therapeutic elliquis  - c/w home vit B12 1000 mg qD  - Diet: DASH/TLC

## 2018-12-17 NOTE — H&P ADULT - ASSESSMENT
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

## 2018-12-17 NOTE — ED PROVIDER NOTE - NS ED ROS FT
GENERAL: No fever or chills, //             EYES: no change in vision, //             HEENT: no trouble swallowing or speaking, //             CARDIAC: no chest pain, //              PULMONARY: no cough or SOB, //             GI: no abdominal pain, no nausea or no vomiting, + diarrhea, constipation, //             : No changes in urination,  //            SKIN: no rashes,  //            NEURO: no headache,  //             MSK: as per HPI, otherwise as HPI or negative.

## 2018-12-17 NOTE — ED ADULT NURSE NOTE - OBJECTIVE STATEMENT
Patient is a 88 year old male complaining of back pain starting 0100. Arrived by EMS from home. Patient has history of HTN, melanoma of R. lower leg with weeping edema on immunotherapy. Patient is A&O x 4 and appears well. Pt reports he has a h/o compressed L4 fx, he believes he sustained several weeks ago while crossing his legs. Also states he was treated in the hospital recently for a GI infection on 11/27/2018. States he is still having some diarrhea. Endorsing complaints of back pain now in ED. Denies complaints of chest pain, sob, fevers, chills, n/v/d, headache, syncope, burning urination, blood in urine, blood in stool, urinary and bowel incontinence, numbness or tingling in the legs. Abd is soft, non tender, non distended. Skin is warm and dry. Right leg is cool and skin is weeping which he states has been baseline since having melanoma. Color is consistent with ethnicity. Patient states he usually walks independently at home, and lives with his wife. VSS/ NAD. Safety and comfort maintained. No visitors at the bedside. Will continue to monitor.

## 2018-12-17 NOTE — H&P ADULT - PROBLEM SELECTOR PLAN 1
- c/w lidocaine patch  - PT eval CT of cervical, thoracic, and lumbar spine revealed prior and new compression fractures i/s/o steroids, specifically L4 appeared stable, T12 progression, and new compression fractures of T12, L1, & L2  - c/w lidocaine patch, tylenol prn mild pain, oxycodone prn severe pain  - c/w home vit D 1000 U qD  - PT eval CT of cervical, thoracic, and lumbar spine revealed prior and new compression fractures i/s/o steroids, specifically L4 appeared stable, T12 progression, and new compression fractures of T12, L1, & L2  - ortho eval  - c/w lidocaine patch, tylenol prn mild pain, oxycodone prn severe pain  - c/w home vit D 1000 U qD  - PT eval CT of cervical, thoracic, and lumbar spine revealed prior and new compression fractures i/s/o steroids, specifically L4 appeared stable, T12 progression, and new compression fractures of T12, L1, & L2  no neurological deficit   - ortho spine  eval  - c/w lidocaine patch, tylenol prn mild pain, oxycodone prn severe pain  - c/w home vit D 1000 U qD  - PT eval CT of cervical, thoracic, and lumbar spine revealed prior and new compression fractures i/s/o steroids, specifically L4 appeared stable, T12 progression, and new compression fractures of T12, L1, & L2  no neurological deficit   - ortho spine consult  - c/w lidocaine patch, tylenol prn mild pain, oxycodone prn severe pain  - c/w home vit D 1000 U qD  - start calcium carbonate 500 mg qD  - PT eval

## 2018-12-17 NOTE — ED ADULT NURSE NOTE - NSIMPLEMENTINTERV_GEN_ALL_ED
Implemented All Fall with Harm Risk Interventions:  [place] to call system. Call bell, personal items and telephone within reach. Instruct patient to call for assistance. Room bathroom lighting operational. Non-slip footwear when patient is off stretcher. Physically safe environment: no spills, clutter or unnecessary equipment. Stretcher in lowest position, wheels locked, appropriate side rails in place. Provide visual cue, wrist band, yellow gown, etc. Monitor gait and stability. Monitor for mental status changes and reorient to person, place, and time. Review medications for side effects contributing to fall risk. Reinforce activity limits and safety measures with patient and family. Provide visual clues: red socks.

## 2018-12-17 NOTE — H&P ADULT - NSHPLABSRESULTS_GEN_ALL_CORE
LABS:                        14.8   15.8  )-----------( 164      ( 17 Dec 2018 07:03 )             45.0     Hgb Trend: 14.8<--  12-17    137  |  100  |  89<H>  ----------------------------<  121<H>  5.2   |  21<L>  |  3.79<H>    Ca    9.0      17 Dec 2018 07:03    TPro  5.5<L>  /  Alb  2.6<L>  /  TBili  0.6  /  DBili  x   /  AST  29  /  ALT  36  /  AlkPhos  142<H>  12-17    Creatinine Trend: 3.79<--, 1.50<--, 1.65<--, 1.38<--, 1.34<--, 1.42<--      RADIOLOGY:    12/17 CT cervical, thoracic, and lumbar spine:    Cervical spine CT: No acute fracture or traumatic subluxation. Multilevel   degenerative changes.    Thoracic spine CT: T9 vertebral body compression fracture of   indeterminate age. Progressive compression fracture at T12.  New is   examination were discussed with Dr. Burris at 10:35 AM on 12/17/2018   compression deformity at T11.    Lumbar spine CT: New compression deformities of the L1 and L2 vertebral   bodies.  Stable compression fracture of L4. LABS:                        14.8   15.8  )-----------( 164      ( 17 Dec 2018 07:03 )             45.0     Hgb Trend: 14.8<--  12-17    137  |  100  |  89<H>  ----------------------------<  121<H>  5.2   |  21<L>  |  3.79<H>    Ca    9.0      17 Dec 2018 07:03    TPro  5.5<L>  /  Alb  2.6<L>  /  TBili  0.6  /  DBili  x   /  AST  29  /  ALT  36  /  AlkPhos  142<H>  12-17    Creatinine Trend: 3.79<--, 1.50<--, 1.65<--, 1.38<--, 1.34<--, 1.42<--      RADIOLOGY:    12/17 CT cervical, thoracic, and lumbar spine:    Cervical spine CT: No acute fracture or traumatic subluxation. Multilevel degenerative changes.    Thoracic spine CT: T9 vertebral body compression fracture of indeterminate age. Progressive compression fracture at T12.  New is examination were discussed with Dr. Burris at 10:35 AM on 12/17/2018 compression deformity at T11.    Lumbar spine CT: New compression deformities of the L1 and L2 vertebral bodies. Stable compression fracture of L4.      11/28 CT A/P:  IMPRESSION: No bowel obstruction or bowel wall thickening.    Peripancreatic cystic lesion in the region of the pancreatic head which is stable in size compared to prior exam 6/7/2018 and was not FDG avid.   Differential includes peripancreatic fluid collection versus cystic neoplasm. Further evaluation with MRCP with and without contrast can be obtained.    Degenerative changes of the spine with compression deformity of the L4 vertebral body with mild bony retropulsion.      6/7/18 PET/CT:    IMPRESSION:  Abnormal whole body FDG-PET/CT scan.    1. Nonspecific small focus of mildly FDG-avid skin thickening, anterior aspect of right ankle. Please correlate clinically.    2. Nonspecific, minimally FDG-avid skin thickening along medial aspect of left mid/distal thigh, possibly postsurgical. Please correlate clinically.    3. Remainder of study is unremarkable, demonstrating no evidence of FDG-avid disease. LABS:                        14.8   15.8  )-----------( 164      ( 17 Dec 2018 07:03 )             45.0     Hgb Trend: 14.8<--  12-17    137  |  100  |  89<H>  ----------------------------<  121<H>  5.2   |  21<L>  |  3.79<H>    Ca    9.0      17 Dec 2018 07:03    TPro  5.5<L>  /  Alb  2.6<L>  /  TBili  0.6  /  DBili  x   /  AST  29  /  ALT  36  /  AlkPhos  142<H>  12-17    Creatinine Trend: 3.79<--, 1.50<--, 1.65<--, 1.38<--, 1.34<--, 1.42<--      RADIOLOGY:    12/17 CT cervical, thoracic, and lumbar spine:    Cervical spine CT: No acute fracture or traumatic subluxation. Multilevel degenerative changes.    Thoracic spine CT: T9 vertebral body compression fracture of indeterminate age. Progressive compression fracture at T12.  New is examination were discussed with Dr. Burris at 10:35 AM on 12/17/2018 compression deformity at T11.    Lumbar spine CT: New compression deformities of the L1 and L2 vertebral bodies. Stable compression fracture of L4.      12/17 CXR:    There are median sternotomy wires, status post CABG. A pacemaker device overlies left hemithorax.  Cardiac silhouette is within normal limits.  Left basilar subsegmental atelectasis.  No pleural effusions or pneumothorax.      11/28 CT A/P:  IMPRESSION: No bowel obstruction or bowel wall thickening.    Peripancreatic cystic lesion in the region of the pancreatic head which is stable in size compared to prior exam 6/7/2018 and was not FDG avid.   Differential includes peripancreatic fluid collection versus cystic neoplasm. Further evaluation with MRCP with and without contrast can be obtained.    Degenerative changes of the spine with compression deformity of the L4 vertebral body with mild bony retropulsion.      6/7/18 PET/CT:    IMPRESSION:  Abnormal whole body FDG-PET/CT scan.    1. Nonspecific small focus of mildly FDG-avid skin thickening, anterior aspect of right ankle. Please correlate clinically.    2. Nonspecific, minimally FDG-avid skin thickening along medial aspect of left mid/distal thigh, possibly postsurgical. Please correlate clinically.    3. Remainder of study is unremarkable, demonstrating no evidence of FDG-avid disease.

## 2018-12-17 NOTE — PROVIDER CONTACT NOTE (MEDICATION) - ASSESSMENT
pt A&Ox4, VSS. family @ bedside questioned if patient received meds. day ELLIE Asher signed off meds in [facility], pt did not receive meds as per ELLIE Asher.

## 2018-12-17 NOTE — ED PROVIDER NOTE - PHYSICAL EXAMINATION
Gen: NAD, AOx3, non-toxic //            Head: NCAT //            HEENT: EOMI, oral mucosa moist, normal conjunctiva //            Lung: CTAB, no respiratory distress, no wheezes/rhonchi/rales B/L, speaking in full sentences. //            CV: RRR, no murmurs//            Abd: soft, NTND, no guarding//            MSK: tender to palpation midline lower lumbar. LE w/ chronic venous stasis changes. mild edema b/l. cool to touch b/l. skin weeping. (baseline findings per pt) //            Neuro: strength 5/5 x4 extremities. No focal sensory or motor deficits //            Skin: Warm, well perfused //            Psych: normal affect.

## 2018-12-17 NOTE — ED ADULT NURSE REASSESSMENT NOTE - NS ED NURSE REASSESS COMMENT FT1
Received report from nurse jackie Conti.  No acute respiratory distress noted, v/s obtained. Received report from nurse Leyda Conti.  No acute respiratory distress noted, v/s obtained.

## 2018-12-17 NOTE — ED PROVIDER NOTE - PMH
Atrial fibrillation    CAD (coronary artery disease)    DM (diabetes mellitus)    HTN (hypertension)    Melanoma    Pacemaker

## 2018-12-17 NOTE — ED PROVIDER NOTE - OBJECTIVE STATEMENT
89 y/o M w/ PMH of L4 compression fx, A fib on eliquis, CAD s/p CABG, DM, HTN, melanoma, pacemaker presenting w/ lower back pain. Pt reports having lower back pain for the past 2 weeks, but it worsened last week. He was discharged from this facility on 12/6 after an admission for diarrhea. L4 compression fx was found on abdominal CT. His pain was controlled while admitted but the day after he returned home his pain worsened. Reports difficulty walking 2/2 to pain. Says he will have to crawl on his hands and knees to the bathroom. Denies weakness in his legs. Denies loss of bladder or bowel control. No numbness or tingling. Prior to his injury was active and plays tennis multiple times a week. No additional complaints at this time. 89 y/o M w/ PMH of L4 compression fx, A fib on eliquis, CAD s/p CABG, DM, HTN, melanoma, pacemaker presenting w/ lower back pain. Pt reports having lower back pain for the past 2 weeks, but it worsened last week. He was discharged from this facility on 12/6 after an admission for diarrhea, bacteremia. L4 compression fx was found on abdominal CT. His pain was controlled while admitted but the day after he returned home his pain worsened. Reports difficulty walking 2/2 to pain. Says he will have to crawl on his hands and knees to the bathroom. Denies weakness in his legs. Denies loss of bladder or bowel control. No numbness or tingling. Prior to his injury was active and plays tennis multiple times a week. No additional complaints at this time.

## 2018-12-17 NOTE — ED PROVIDER NOTE - SHIFT CHANGE DETAILS
I have received sign out on this patient, briefly: 89y/o M with h/o melanoma, CAD, DM, HTN, PPM s/p CABGx4, recently diagnosed with L4 compression fracture, now presenting with worsenign back pain and difficulty walking; xray performed by overnight team of Lumbar spine revealed evidence of T12 compression fracture; no apparent traumatic injuries / falls.  No bowel/bladder dysfunction, no LE weakness.  Current plan is pain control, obtain CT cervical/thoracic/lumbar spine and then admission given inability to walk. Chikis I have received sign out on this patient, briefly: 87y/o M with h/o melanoma, CAD, DM, HTN, PPM s/p CABGx4, recently diagnosed with L4 compression fracture, now presenting with worsening back pain and difficulty walking; xray performed by overnight team of Lumbar spine revealed evidence of T12 compression fracture; no apparent traumatic injuries / falls.  No bowel/bladder dysfunction, no LE weakness.  Current plan is pain control, obtain CT cervical/thoracic/lumbar spine and then admission given inability to walk. Chikis

## 2018-12-17 NOTE — H&P ADULT - PROBLEM SELECTOR PLAN 3
no localizing symptoms except diarrhea , CXR with small opacity , UA neg for infection, RVP negative , patient is immunocompromised, however non toxic appearing hemodynamically stable , S/p ceftriaxone 1 G , will hold further antibiotics and monitor closely   - f/u blood cultures   - f/u urine cultures   - f/u stool studies   - f/u CXR final report s/p repletion   - telemetry   - monitor serum potassium Leukocytosis w WBC 16 on admission. No localizing symptoms except diarrhea. No URI sx, non-toxic appearing, hemodynamically stable, will monitor off antibiotics  - f/u UA  - monitor CBC, would start abx if febrile Leukocytosis w WBC 16 on admission. No localizing symptoms except diarrhea. No URI sx, non-toxic appearing, hemodynamically stable, will monitor off antibiotics  - f/u UA & CXR  - monitor CBC Likely prerenal  check bladder scan  check renal sono  hold lasix  gentle IVF  monitor BMP Likely prerenal  - check bladder scan  - check renal sono  - hold lasix  - gentle IVF  - monitor BMP  - avoid nephrotoxic agents  - renally dose medications Likely pre-renal  - check post-void bladder scan  - check renal sono  - hold lasix  - gentle IVF at 75 cc/hr  - monitor BMP  - avoid nephrotoxic agents  - renally dose medications

## 2018-12-17 NOTE — H&P ADULT - PROBLEM SELECTOR PLAN 6
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 Started on nivolumab, then added ipilimumab, complicated by immune-related diarrhea on recent admission.  - outpt oncology follow up w Dr Oakley

## 2018-12-17 NOTE — ED PROVIDER NOTE - ATTENDING CONTRIBUTION TO CARE
GEN: no acute respiratory distress. nontoxic, speaking comfortably in full sentences  HEENT: NCAT. face symmetrical. PERRL 4mm, EOMI, MMM, oropharynx wnl.  Neck: no JVD, trachea midline, no LAD  CV: RRR. +S1S2, no murmur. 2+ pulses in 4 extremities  Chest: CTA B/l. no wheezing, rales, rhonchi. no retractions.  ABD: +BS, soft, non distended, non tender. No guarding/rebound.   MSK: No clubbing, cyanosis, edema. FROM of all extremities. no ext tenderness to palpation. no midline, + lumbar paraspinal tenderness. no spinal step-offs.  Neuro: AOOX3.  Sensation intact, motor 5/5 throughout.    89 yo M PMH A fib on eliquis, CAD s/p CABG, pacer, DM, HTN, melanoma, recently diagnosed l4 compression fx during last admission with worsening back pain and difficulty ambulating 2/2 pain. neurovasc intact. plan: labs, XR T spine, symptom relief. pt tba after work up 2/2 imability to walk. T spine xr (prelim) showing new T12 fx--> CT ordered to further investigate. pt s/o to dayteam pending results.

## 2018-12-17 NOTE — ED PROVIDER NOTE - CARE PLAN
Principal Discharge DX:	JOSE MARIA (acute kidney injury)  Secondary Diagnosis:	Compression fracture of lumbar spine, non-traumatic

## 2018-12-17 NOTE — H&P ADULT - PROBLEM SELECTOR PLAN 4
s/p repletion   - telemetry   - monitor serum potassium Leukocytosis w WBC 16 on admission. No localizing symptoms except diarrhea. No URI sx, non-toxic appearing, hemodynamically stable, will monitor off antibiotics  - f/u UA  - monitor CBC, would start abx if febrile BP well-controlled  - c/w home amlodipine 10 mg qD  - c/w home furosemide 20 mg Leukocytosis w WBC 16 on admission with . No localizing symptoms except diarrhea. No URI sx, non-toxic appearing, hemodynamically stable, will monitor off antibiotics  - f/u UA & CXR  - monitor CBC  - Likely secondary to prednisone.   - low suspicion for infection   - sine patient has bacteremia   - will repeat BC

## 2018-12-18 DIAGNOSIS — E11.9 TYPE 2 DIABETES MELLITUS WITHOUT COMPLICATIONS: ICD-10-CM

## 2018-12-18 DIAGNOSIS — B96.89 OTHER SPECIFIED BACTERIAL AGENTS AS THE CAUSE OF DISEASES CLASSIFIED ELSEWHERE: ICD-10-CM

## 2018-12-18 LAB
ALBUMIN SERPL ELPH-MCNC: 2.2 G/DL — LOW (ref 3.3–5)
ALP SERPL-CCNC: 125 U/L — HIGH (ref 40–120)
ALT FLD-CCNC: 28 U/L — SIGNIFICANT CHANGE UP (ref 10–45)
ANION GAP SERPL CALC-SCNC: 18 MMOL/L — HIGH (ref 5–17)
ANISOCYTOSIS BLD QL: SLIGHT — SIGNIFICANT CHANGE UP
AST SERPL-CCNC: 29 U/L — SIGNIFICANT CHANGE UP (ref 10–40)
BASOPHILS # BLD AUTO: 0 K/UL — SIGNIFICANT CHANGE UP (ref 0–0.2)
BASOPHILS NFR BLD AUTO: 0 % — SIGNIFICANT CHANGE UP (ref 0–2)
BILIRUB SERPL-MCNC: 0.5 MG/DL — SIGNIFICANT CHANGE UP (ref 0.2–1.2)
BUN SERPL-MCNC: 97 MG/DL — HIGH (ref 7–23)
CALCIUM SERPL-MCNC: 8.1 MG/DL — LOW (ref 8.4–10.5)
CHLORIDE SERPL-SCNC: 98 MMOL/L — SIGNIFICANT CHANGE UP (ref 96–108)
CO2 SERPL-SCNC: 20 MMOL/L — LOW (ref 22–31)
CREAT SERPL-MCNC: 3.9 MG/DL — HIGH (ref 0.5–1.3)
DACRYOCYTES BLD QL SMEAR: SLIGHT — SIGNIFICANT CHANGE UP
ELLIPTOCYTES BLD QL SMEAR: SLIGHT — SIGNIFICANT CHANGE UP
EOSINOPHIL # BLD AUTO: 0.1 K/UL — SIGNIFICANT CHANGE UP (ref 0–0.5)
EOSINOPHIL NFR BLD AUTO: 0 % — SIGNIFICANT CHANGE UP (ref 0–6)
GLUCOSE BLDC GLUCOMTR-MCNC: 131 MG/DL — HIGH (ref 70–99)
GLUCOSE BLDC GLUCOMTR-MCNC: 132 MG/DL — HIGH (ref 70–99)
GLUCOSE BLDC GLUCOMTR-MCNC: 150 MG/DL — HIGH (ref 70–99)
GLUCOSE BLDC GLUCOMTR-MCNC: 155 MG/DL — HIGH (ref 70–99)
GLUCOSE SERPL-MCNC: 153 MG/DL — HIGH (ref 70–99)
HCT VFR BLD CALC: 45.7 % — SIGNIFICANT CHANGE UP (ref 39–50)
HGB BLD-MCNC: 14.8 G/DL — SIGNIFICANT CHANGE UP (ref 13–17)
HYPOCHROMIA BLD QL: SLIGHT — SIGNIFICANT CHANGE UP
LYMPHOCYTES # BLD AUTO: 0.5 K/UL — LOW (ref 1–3.3)
LYMPHOCYTES # BLD AUTO: 2 % — LOW (ref 13–44)
MAGNESIUM SERPL-MCNC: 2.6 MG/DL — SIGNIFICANT CHANGE UP (ref 1.6–2.6)
MAGNESIUM SERPL-MCNC: 2.9 MG/DL — HIGH (ref 1.6–2.6)
MCHC RBC-ENTMCNC: 28.7 PG — SIGNIFICANT CHANGE UP (ref 27–34)
MCHC RBC-ENTMCNC: 32.3 GM/DL — SIGNIFICANT CHANGE UP (ref 32–36)
MCV RBC AUTO: 88.6 FL — SIGNIFICANT CHANGE UP (ref 80–100)
MONOCYTES # BLD AUTO: 0.6 K/UL — SIGNIFICANT CHANGE UP (ref 0–0.9)
MONOCYTES NFR BLD AUTO: 5 % — SIGNIFICANT CHANGE UP (ref 2–14)
MYELOCYTES NFR BLD: 3 % — HIGH (ref 0–0)
NEUTROPHILS # BLD AUTO: 13.7 K/UL — HIGH (ref 1.8–7.4)
NEUTROPHILS NFR BLD AUTO: 78 % — HIGH (ref 43–77)
NEUTS BAND # BLD: 12 % — HIGH (ref 0–8)
NRBC # BLD: 1 /100 — HIGH (ref 0–0)
OSMOLALITY SERPL: 331 MOS/KG — HIGH (ref 275–300)
OSMOLALITY UR: 435 MOS/KG — SIGNIFICANT CHANGE UP (ref 300–900)
OVALOCYTES BLD QL SMEAR: SLIGHT — SIGNIFICANT CHANGE UP
PHOSPHATE SERPL-MCNC: 7.7 MG/DL — HIGH (ref 2.5–4.5)
PLAT MORPH BLD: NORMAL — SIGNIFICANT CHANGE UP
PLATELET # BLD AUTO: 145 K/UL — LOW (ref 150–400)
POIKILOCYTOSIS BLD QL AUTO: SLIGHT — SIGNIFICANT CHANGE UP
POLYCHROMASIA BLD QL SMEAR: SLIGHT — SIGNIFICANT CHANGE UP
POTASSIUM SERPL-MCNC: 4.8 MMOL/L — SIGNIFICANT CHANGE UP (ref 3.5–5.3)
POTASSIUM SERPL-SCNC: 4.8 MMOL/L — SIGNIFICANT CHANGE UP (ref 3.5–5.3)
PROT SERPL-MCNC: 5 G/DL — LOW (ref 6–8.3)
RBC # BLD: 5.16 M/UL — SIGNIFICANT CHANGE UP (ref 4.2–5.8)
RBC # FLD: 17.4 % — HIGH (ref 10.3–14.5)
RBC BLD AUTO: ABNORMAL
SCHISTOCYTES BLD QL AUTO: SLIGHT — SIGNIFICANT CHANGE UP
SODIUM SERPL-SCNC: 136 MMOL/L — SIGNIFICANT CHANGE UP (ref 135–145)
SODIUM UR-SCNC: <20 MMOL/L — SIGNIFICANT CHANGE UP
WBC # BLD: 14.9 K/UL — HIGH (ref 3.8–10.5)
WBC # FLD AUTO: 14.9 K/UL — HIGH (ref 3.8–10.5)

## 2018-12-18 PROCEDURE — 99233 SBSQ HOSP IP/OBS HIGH 50: CPT | Mod: GC

## 2018-12-18 PROCEDURE — 99222 1ST HOSP IP/OBS MODERATE 55: CPT

## 2018-12-18 PROCEDURE — 99232 SBSQ HOSP IP/OBS MODERATE 35: CPT

## 2018-12-18 RX ORDER — DEXTROSE 50 % IN WATER 50 %
25 SYRINGE (ML) INTRAVENOUS ONCE
Qty: 0 | Refills: 0 | Status: DISCONTINUED | OUTPATIENT
Start: 2018-12-18 | End: 2018-12-19

## 2018-12-18 RX ORDER — GLUCAGON INJECTION, SOLUTION 0.5 MG/.1ML
1 INJECTION, SOLUTION SUBCUTANEOUS ONCE
Qty: 0 | Refills: 0 | Status: DISCONTINUED | OUTPATIENT
Start: 2018-12-18 | End: 2018-12-19

## 2018-12-18 RX ORDER — INSULIN LISPRO 100/ML
VIAL (ML) SUBCUTANEOUS
Qty: 0 | Refills: 0 | Status: DISCONTINUED | OUTPATIENT
Start: 2018-12-18 | End: 2018-12-19

## 2018-12-18 RX ORDER — DEXTROSE 50 % IN WATER 50 %
12.5 SYRINGE (ML) INTRAVENOUS ONCE
Qty: 0 | Refills: 0 | Status: DISCONTINUED | OUTPATIENT
Start: 2018-12-18 | End: 2018-12-19

## 2018-12-18 RX ORDER — DEXTROSE 50 % IN WATER 50 %
15 SYRINGE (ML) INTRAVENOUS ONCE
Qty: 0 | Refills: 0 | Status: DISCONTINUED | OUTPATIENT
Start: 2018-12-18 | End: 2018-12-19

## 2018-12-18 RX ORDER — VANCOMYCIN HCL 1 G
125 VIAL (EA) INTRAVENOUS EVERY 6 HOURS
Qty: 0 | Refills: 0 | Status: DISCONTINUED | OUTPATIENT
Start: 2018-12-18 | End: 2018-12-19

## 2018-12-18 RX ORDER — SODIUM CHLORIDE 9 MG/ML
1000 INJECTION INTRAMUSCULAR; INTRAVENOUS; SUBCUTANEOUS
Qty: 0 | Refills: 0 | Status: DISCONTINUED | OUTPATIENT
Start: 2018-12-18 | End: 2018-12-19

## 2018-12-18 RX ORDER — SODIUM CHLORIDE 9 MG/ML
1000 INJECTION, SOLUTION INTRAVENOUS
Qty: 0 | Refills: 0 | Status: DISCONTINUED | OUTPATIENT
Start: 2018-12-18 | End: 2018-12-19

## 2018-12-18 RX ADMIN — Medication 1 LOZENGE: at 23:50

## 2018-12-18 RX ADMIN — Medication 25 MILLIGRAM(S): at 18:03

## 2018-12-18 RX ADMIN — Medication 1 TABLET(S): at 11:23

## 2018-12-18 RX ADMIN — Medication 1 APPLICATION(S): at 06:51

## 2018-12-18 RX ADMIN — Medication 1 LOZENGE: at 16:53

## 2018-12-18 RX ADMIN — Medication 1 LOZENGE: at 11:24

## 2018-12-18 RX ADMIN — APIXABAN 2.5 MILLIGRAM(S): 2.5 TABLET, FILM COATED ORAL at 18:03

## 2018-12-18 RX ADMIN — Medication 1000 UNIT(S): at 11:25

## 2018-12-18 RX ADMIN — Medication 125 MILLIGRAM(S): at 18:03

## 2018-12-18 RX ADMIN — ATORVASTATIN CALCIUM 10 MILLIGRAM(S): 80 TABLET, FILM COATED ORAL at 21:37

## 2018-12-18 RX ADMIN — Medication 25 MILLIGRAM(S): at 06:51

## 2018-12-18 RX ADMIN — SODIUM CHLORIDE 75 MILLILITER(S): 9 INJECTION INTRAMUSCULAR; INTRAVENOUS; SUBCUTANEOUS at 18:02

## 2018-12-18 RX ADMIN — Medication 1 LOZENGE: at 01:47

## 2018-12-18 RX ADMIN — Medication 125 MILLIGRAM(S): at 23:50

## 2018-12-18 RX ADMIN — Medication 125 MILLIGRAM(S): at 11:25

## 2018-12-18 RX ADMIN — Medication 1 LOZENGE: at 21:37

## 2018-12-18 RX ADMIN — Medication 125 MILLIGRAM(S): at 06:50

## 2018-12-18 RX ADMIN — APIXABAN 2.5 MILLIGRAM(S): 2.5 TABLET, FILM COATED ORAL at 06:51

## 2018-12-18 RX ADMIN — AMLODIPINE BESYLATE 5 MILLIGRAM(S): 2.5 TABLET ORAL at 06:51

## 2018-12-18 RX ADMIN — PREGABALIN 1000 MICROGRAM(S): 225 CAPSULE ORAL at 11:25

## 2018-12-18 NOTE — CONSULT NOTE ADULT - SUBJECTIVE AND OBJECTIVE BOX
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 He denied any numbness, tingling , focal weakness, urinary or faecal incontinence. he denied any h/o fever , chills or night sweat. He also stated  that at some his twisted his back.   Pt reports he was last able to walk normally in early november prior to recent admission. On that admission, patient reported diarrhea consisted of  3-4 episodes of watery stool a day over the past month, symptoms were attributed  to the immunotherapy  and patient had been on prednisone  which improved his symptoms. During admission, pt had been found to have L4 compression fracture. Since then, he reports he had worsening lower back pain progressively limiting his ability to walk. He also reports diarrhea which had improved on last admission but has recently gotten worse, no BM's since yesterday. Reports starting new medication for thrush 4 days ago, possibly fluconazole.    ED vitals afebrile with , /85, RR 18, SpO2 94% on RA. Pt underwent CT of cervical, thoracic, and lumbar spine, which revealed prior and new compression fractures, specifically L4 appeared stable, T12 progression, and new compression fractures of T12, L1, & L2. Pt was given IV tylenol, oxy 5, and lidocaine patch. Currently reports back pain is minimal when he is not moving. Pt denies fever, chills, abd pain, nausea, vomiting, constipation, melana, hematochezia, lightheadedness. (17 Dec 2018 14:08)"    Above reviewed. Saw/spoke to patient. Patient familiar to me. On recent admission was found to have a Sphingomonas bacteremia of unclear etiology. History immunocompromise. Completed course of meropenem for bacteremia. In the interim, patient developed episodes of loose bowel movements and watery diarrhea. No abd pain, no fevers, no chills. No other new complaints otherwise.    PAST MEDICAL & SURGICAL HISTORY:  Pacemaker  Melanoma  Atrial fibrillation  CAD (coronary artery disease)  DM (diabetes mellitus)  HTN (hypertension)  S/P CABG x 4    Allergies    ciprofloxacin (Unknown)  clindamycin (Unknown)  Levaquin (Unknown)  penicillin (Unknown)    ANTIMICROBIALS:  clotrimazole Lozenge 1 five times a day  vancomycin    Solution 125 every 6 hours    OTHER MEDS:  acetaminophen   Tablet .. 650 milliGRAM(s) Oral every 6 hours PRN  amLODIPine   Tablet 5 milliGRAM(s) Oral daily  apixaban 2.5 milliGRAM(s) Oral every 12 hours  atorvastatin 10 milliGRAM(s) Oral at bedtime  BACItracin   Ointment 1 Application(s) Topical two times a day  calcium carbonate    500 mG (Tums) Chewable 1 Tablet(s) Chew daily  cholecalciferol 1000 Unit(s) Oral daily  cyanocobalamin 1000 MICROGram(s) Oral daily  oxyCODONE    IR 5 milliGRAM(s) Oral every 4 hours PRN  predniSONE   Tablet 25 milliGRAM(s) Oral two times a day    SOCIAL HISTORY: No tobacco, no alcohol, no illicit drugs    FAMILY HISTORY:  Family history of chronic ischemic heart disease    Drug Dosing Weight  Height (cm): 177.8 (18 Dec 2018 04:18)  Weight (kg): 71.8 (18 Dec 2018 04:18)  BMI (kg/m2): 22.7 (18 Dec 2018 04:18)  BSA (m2): 1.89 (18 Dec 2018 04:18)    PE:    Vital Signs Last 24 Hrs  T(C): 36.9 (18 Dec 2018 04:18), Max: 36.9 (18 Dec 2018 04:18)  T(F): 98.4 (18 Dec 2018 04:18), Max: 98.4 (18 Dec 2018 04:18)  HR: 101 (18 Dec 2018 06:49) (88 - 109)  BP: 148/94 (18 Dec 2018 06:49) (124/81 - 148/94)  RR: 18 (18 Dec 2018 04:18) (18 - 18)  SpO2: 99% (18 Dec 2018 04:18) (94% - 99%)    Gen: AOx3, NAD, non-toxic, pleasant  CV: S1+S2 normal, tachycardic  Resp: Clear bilat, no resp distress, no crackles/wheezes  Abd: Soft, nontender, +BS, no distension  Ext: No LE edema, no wounds  : No [place], no suprapubic tenderness  IV/Skin: No thrombophlebitis, no rash  Msk: No low back pain, no arthralgias, no joint swelling  Neuro: No sensory deficits, no motor deficits    LABS:                        14.8   14.9  )-----------( 145      ( 18 Dec 2018 09:49 )             45.7     12-18    136  |  98  |  97<H>  ----------------------------<  153<H>  4.8   |  20<L>  |  3.90<H>    Ca    8.1<L>      18 Dec 2018 09:39  Phos  7.7     12-18  Mg     2.6     12-18    TPro  5.0<L>  /  Alb  2.2<L>  /  TBili  0.5  /  DBili  x   /  AST  29  /  ALT  28  /  AlkPhos  125<H>  12-18    MICROBIOLOGY:    .Blood Blood  12-01-18   No growth at 5 days.      .Stool Feces  11-28-18   GI PCR Results: NOT detected    .Urine Clean Catch (Midstream)  11-28-18   <10,000 CFU/ml Normal Urogenital becki present     .Blood Blood  11-28-18   Growth in aerobic bottle: Sphingomonas paucimobilis  --  Sphingomonas paucimobilis    Clostridium difficile GD Toxins A&amp;B, EIA:   Positive (12-17-18 @ 22:39)    (otherwise reviewed)    RADIOLOGY:    12/17 CXR:    IMPRESSION:     There are median sternotomy wires, status post CABG. A pacemaker device   overlies left hemithorax.  Cardiac silhouette is within normal limits.  Left basilar subsegmental atelectasis.  No pleural effusions or pneumothorax.    12/17 CT:    Impression:    Cervical spine CT: No acute fracture or traumatic subluxation. Multilevel   degenerative changes.    Thoracic spine CT: T9 vertebral body compression fracture of   indeterminate age. Progressive compression fracture at T12.  New is   examination were discussed with Dr. Burris at 10:35 AM on 12/17/2018   compression deformity at T11.    Lumbar spine CT: New compression deformities of the L1 and L2 vertebral   bodies.  Stable compression fracture of L4.
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 He denied any numbness, tingling , focal weakness, urinary or faecal incontinence. he denied any h/o fever , chills or night sweat. He also stated  that at some his twisted his back.   Pt reports he was last able to walk normally in early november prior to recent admission. On that admission, patient reported diarrhea consisted of  3-4 episodes of watery stool a day over the past month, symptoms were attributed  to the immunotherapy  and patient had been on prednisone  which improved his symptoms. During admission, pt had been found to have L4 compression fracture. Since then, he reports he had worsening lower back pain progressively limiting his ability to walk. He also reports diarrhea which had improved on last admission but has recently gotten worse, no BM's since yesterday. Reports starting new medication for thrush 4 days ago, possibly fluconazole.      PMH:  Pacemaker  Melanoma  Atrial fibrillation  CAD (coronary artery disease)  DM (diabetes mellitus)  Multiple myeloma  HTN (hypertension)    PSH:  S/P CABG x 4    AH:  ciprofloxacin (Unknown)  clindamycin (Unknown)  Levaquin (Unknown)  penicillin (Unknown)    Meds: See med rec    T(C): 36.5 (12-17-18 @ 21:44)  HR: 109 (12-17-18 @ 21:44)  BP: 139/88 (12-17-18 @ 21:44)  RR: 18 (12-17-18 @ 21:44)  SpO2: 94% (12-17-18 @ 21:44)  Wt(kg): --    PE Spine:  No TTP over spinous processes, SILT C5-T1 & L2-S1, C5-C8 5/5 BL, L3-S1 5/5 BL, Able to SLR, +Distal pulses, (-) plunkett, (-) clonus, (-) SLR, (-) babinski    Secondary:  No TTP over bony landmarks, SILT BL, ROM intact BL, distal pulses palpable.    Imaging:    Cervical spine CT: No acute fracture or traumatic subluxation. Multilevel   degenerative changes.    Thoracic spine CT: T9 vertebral body compression fracture of   indeterminate age. Progressive compression fracture at T12.  New is   examination were discussed with Dr. Burris at 10:35 AM on 12/17/2018   compression deformity at T11.    Lumbar spine CT: New compression deformities of the L1 and L2 vertebral   bodies.  Stable compression fracture of L4.
Wound SURGERY CONSULT NOTE    HPI:  Patient is an 88 year old male with past medical history of HTN, HLD, DM, CAD s/p CABG x3  in 2010, bradycardia s/p PPM in 2017, afib on Eliquis, metastatic melanoma of R ankle s/p excision and skin grafts currently  on Opvido and Yervoy, and recent admission for  diarrhea and bacteremia   presenting for around 2 weeks history of progressive back pain, which is dull to sharp, non radiating , get wore with the movement and  now to the point that he is unable to walk. He denied any numbness, tingling , focal weakness, urinary or faecal incontinence. he denied any h/o fever , chills or night sweat. He also stated  that at some his twisted his back.   Pt reports he was last able to walk normally in early november prior to recent admission. On that admission, patient reported diarrhea consisted of  3-4 episodes of watery stool a day over the past month, symptoms were attributed  to the immunotherapy  and patient had been on prednisone  which improved his symptoms. During admission, pt had been found to have L4 compression fracture. Since then, he reports he had worsening lower back pain progressively limiting his ability to walk. He also reports diarrhea which had improved on last admission but has recently gotten worse, no BM's since yesterday. Reports starting new medication for thrush 4 days ago, possibly fluconazole.    ED vitals afebrile with , /85, RR 18, SpO2 94% on RA. Pt underwent CT of cervical, thoracic, and lumbar spine, which revealed prior and new compression fractures, specifically L4 appeared stable, T12 progression, and new compression fractures of T12, L1, & L2. Pt was given IV tylenol, oxy 5, and lidocaine patch. Currently reports back pain is minimal when he is not moving. Pt denies fever, chills, abd pain, nausea, vomiting, constipation, melana, hematochezia, lightheadedness.     Wound consult requested to assist w/ management of  leg  wound.  DSD placed awaiting consult. No drainage, odor, redness, warmth, pain, f/c/s noted. Drainage not managed by dressing.  Pt seems impatient with all the doctors looking at his leg wounds, but allows exam.  Pt not clear on types of dressings but his family notes that he was compliant w/ compression socks.  Pt's son & wife at bedside.  All questions asked and answered to pt & family's satisfaction.       PAST MEDICAL & SURGICAL HISTORY:  Pacemaker  Melanoma  Atrial fibrillation  CAD (coronary artery disease)  DM (diabetes mellitus)  HTN (hypertension)  S/P CABG x 4      REVIEW OF SYSTEMS  Skin/ MSK: see HPI  All other systems negative    MEDICATIONS  (STANDING):  amLODIPine   Tablet 5 milliGRAM(s) Oral daily  apixaban 2.5 milliGRAM(s) Oral every 12 hours  atorvastatin 10 milliGRAM(s) Oral at bedtime  BACItracin   Ointment 1 Application(s) Topical two times a day  calcium carbonate    500 mG (Tums) Chewable 1 Tablet(s) Chew daily  cholecalciferol 1000 Unit(s) Oral daily  clotrimazole Lozenge 1 Lozenge Oral five times a day  cyanocobalamin 1000 MICROGram(s) Oral daily  predniSONE   Tablet 25 milliGRAM(s) Oral two times a day  vancomycin    Solution 125 milliGRAM(s) Oral every 6 hours    MEDICATIONS  (PRN):  acetaminophen   Tablet .. 650 milliGRAM(s) Oral every 6 hours PRN Mild Pain (1 - 3)  oxyCODONE    IR 5 milliGRAM(s) Oral every 4 hours PRN Severe Pain (7 - 10)      Allergies    ciprofloxacin (Unknown)  clindamycin (Unknown)  Levaquin (Unknown)  penicillin (Unknown)    SOCIAL HISTORY:  ; Former smoker, Denies smoking, ETOH, drugs    FAMILY HISTORY:  Family history of chronic ischemic heart disease      Vital Signs Last 24 Hrs  T(C): 36.9 (18 Dec 2018 04:18), Max: 36.9 (18 Dec 2018 04:18)  T(F): 98.4 (18 Dec 2018 04:18), Max: 98.4 (18 Dec 2018 04:18)  HR: 101 (18 Dec 2018 06:49) (88 - 109)  BP: 148/94 (18 Dec 2018 06:49) (124/81 - 148/94)  BP(mean): --  RR: 18 (18 Dec 2018 04:18) (18 - 18)  SpO2: 99% (18 Dec 2018 04:18) (94% - 99%)    NAD /  A&Ox3/ frail  WD/ WN/ Disheveled  Versa Care P500 bed    Cardiovascular: RRR     Respiratory: CTA    Gastrointestinal soft NT/ND (+)BS      Neurology  weakened strength & sensation grossly intact    Musculoskeletal/Vascular: FROM x4  mild BLE edema equal   no DP/PT pulses palpable  BLE equally cool  no acute ischemia noted  hemosiderin staining  no deformities/ contractures  Rt calf w/ 2 cm x 1.6cm x 0.2cm superficial ulcer  skin dry & scaling w/ hypo & hyperpigmented areas w/ scattered nodules   (+) serous drainage  No odor, erythema, increased warmth, tenderness, induration, fluctuance      Skin:  dry, frail,  ecchymosis w/o hematoma      LABS:  12-18    136  |  98  |  97<H>  ----------------------------<  153<H>  4.8   |  20<L>  |  3.90<H>    Ca    8.1<L>      18 Dec 2018 09:39  Phos  7.7     12-18  Mg     2.6     12-18    TPro  5.0<L>  /  Alb  2.2<L>  /  TBili  0.5  /  DBili  x   /  AST  29  /  ALT  28  /  AlkPhos  125<H>  12-18                          14.8   14.9  )-----------( 145      ( 18 Dec 2018 09:49 )             45.7           RADIOLOGY & ADDITIONAL STUDIES:  < from: Xray Chest 1 View- PORTABLE-Urgent (12.17.18 @ 14:54) >  IMPRESSION:     There are median sternotomy wires, status post CABG. A pacemaker device   overlies left hemithorax.  Cardiac silhouette is within normal limits.  Left basilar subsegmental atelectasis.  No pleural effusions or pneumothorax.    < from: CT Lumbar Spine No Cont (12.17.18 @ 08:14) >  FINDINGS:     Cervical spine CT:    There is straightening of the normal cervical lordosis. There is   retrolisthesis of C5 on C6 and anterolisthesis of C7 on T1.    There is a chronic anterior wedging of the C6 vertebral body, likely   degenerative in nature.    No acute fracture or traumatic subluxation is identified. Multilevel   degenerative changes with disc space narrowing, osteophyte and   uncovertebral joint formation as well as facet arthropathy contribute to   multilevel central canal and neural foraminal stenosis.    The prevertebral soft tissues are not remarkable in appearance.    There is mild bilateral apical pleural thickening.     A pacemaker is noted to be in place.    Thoracic spine CT    The normal thoracic kyphosis is maintained.    There are compression fractures of the T9, T11, T12 and L1 vertebral   bodies.    The T12 vertebral body compression fracture has progressed when compared   with the prior study. The L1 vertebral body compression fracture is new   compared with the prior study. The T9 vertebral body has no images for   comparison and is of indeterminate age. There is very minimal   retropulsion of the T12 and L1 vertebral body superior cortex without   significant compromise of the central canal.    The remaining vertebral bodies are well-maintained. Mild multilevel   degenerative changes osteophyte formation are noted. No destructive   lesions are present.    Bilateral renal cysts are again identified. A right renal calculus is   noted.    Lumbar spine CT:    There is loss of the normal lumbar lordosis.    There are new mild compression deformities of the L1 and L2 vertebral   bodies. There is a stable compression deformity of the L4 vertebral body.   The L3 and L5 vertebral bodies remain well-maintained. The posterior   cortices of the L1-L2 vertebral bodies is maintained. There is slight   retropulsion of the posterior cortex of L4 which is unchanged when   compared with the prior study.    There are multilevel degenerative changes which are most severe at the   L5-S1 level with there is retrolisthesis of L5 on S1, osteophyte   formation and vacuum disc phenomenon. There is narrowing of the neural   foramina bilaterally.    No destructive lesions are identified.    L3-4: A right parasagittal foraminal disc herniation effaces the ventral   aspect of the thecal sac and narrows the right neural foramen.    L4-5: Disc bulge with bilateral facet ligamentous hypertrophy.    L5-S1: Possible central left-sided disc herniation.    Impression:    Cervical spine CT: No acute fracture or traumatic subluxation. Multilevel   degenerative changes.    Thoracic spine CT: T9 vertebral body compression fracture of   indeterminate age. Progressive compression fracture at T12.  New is   examination were discussed with Dr. Burris at 10:35 AM on 12/17/2018   compression deformity at T11.    Lumbar spine CT: New compression deformities of the L1 and L2 vertebral   bodies.  Stable compression fracture of L4.       Cultures:  Culture - Blood in AM (12.01.18 @ 08:10)    Specimen Source: .Blood Blood    Culture Results:   No growth at 5 days.    C. difficile GDH &amp; toxins A/B by EIA . (12.17.18 @ 22:39)    Clostridium difficile GDH Toxins A&amp;B, EIA:   Positive    Clostridium difficile GDH Interpretation: Positive for toxigenic C. Difficile.  This specimen is positive for C.  Difficile glutamate dehydrogenase (GDH) antigen and positive for C.  Difficile Toxins A & B, by EIA.  GDH is a highly sensitive marker for C.  Difficile that is produced in largeamounts by all C. Difficile strains,  both toxigenic and nontoxigenic.  This assay has not been validated as a  test of cure.  The results of this assay should always be interpreted in  conjunction with patient's clinical history.

## 2018-12-18 NOTE — PROGRESS NOTE ADULT - PROBLEM SELECTOR PLAN 3
Likely pre-renal  - check post-void bladder scan  - check renal sono  - hold lasix  - gentle IVF at 75 cc/hr  - monitor BMP  - avoid nephrotoxic agents  - renally dose medications reports recurrent diarrhea and poor appetite i/s/o recent course of meropenem, Cdiff PCR positive, started on PO vanc  - c/w vancomycin 125 mg PO q6h x 10 days (12/18 - 12/28)  - steroid taper - c/w prednisone 25 mg bid (home dose 20 mg tid).

## 2018-12-18 NOTE — PROGRESS NOTE ADULT - PROBLEM SELECTOR PLAN 10
- DVT ppx: on therapeutic elliquis  - c/w home vit B12 1000 mg qD  - Diet: DASH/TLC  - Dispo: PT eval

## 2018-12-18 NOTE — PROGRESS NOTE ADULT - PROBLEM SELECTOR PLAN 5
BP well-controlled  - c/w amlodipine 5 mg qD HgA1C 7.4%. FS well-controlled  - c/w ISS  - monitor FS

## 2018-12-18 NOTE — PROGRESS NOTE ADULT - PROBLEM SELECTOR PLAN 4
Leukocytosis w WBC 16 on admission with . No localizing symptoms except diarrhea. No URI sx, non-toxic appearing, hemodynamically stable, will monitor off antibiotics  - f/u UA & CXR  - monitor CBC  - Likely secondary to prednisone.   - low suspicion for infection   - sine patient has bacteremia   - will repeat BC Leukocytosis w WBC 16 and 12% bands on admission with no localizing symptoms except diarrhea. No URI sx, non-toxic appearing, hemodynamically stable, initially thought likely secondary to prednisone and monitored off abx, then started PO vanc when Cdiff PCR came back positive  - c/w vanc for Cdiff as above  - monitor CBC  - f/u BCx

## 2018-12-18 NOTE — PROGRESS NOTE ADULT - PROBLEM SELECTOR PLAN 6
CAD s/p CABG x3  in 2010, bradycardia s/p PPM in 2017  - c/w home atorvastatin 10 mg qD BP well-controlled  - c/w amlodipine 5 mg qD

## 2018-12-18 NOTE — CONSULT NOTE ADULT - REASON FOR ADMISSION
compression fractures, c/c back pain

## 2018-12-18 NOTE — PROGRESS NOTE ADULT - PROBLEM SELECTOR PLAN 9
- DVT ppx: on therapeutic elliquis  - c/w home vit B12 1000 mg qD  - Diet: DASH/TLC - c/w home elliquis 2.5 mg q12h

## 2018-12-18 NOTE — PROGRESS NOTE ADULT - PROBLEM SELECTOR PLAN 8
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 Started on nivolumab, then added ipilimumab, complicated by immune-related diarrhea on recent admission.  - c/w wound care  - outpt oncology follow up w Dr Oakley

## 2018-12-18 NOTE — PROGRESS NOTE ADULT - ATTENDING COMMENTS
No change in neuro exam and its intact, no bladder and bowel dysfunction.  C Diff +  ortho note appreciated.   TLSO brace  JOSE MARIA- creatinine is worsening- renal sono, renal consult, monitor BMP  continue PO Vanco   d/w Dr Cervantes

## 2018-12-18 NOTE — PROGRESS NOTE ADULT - PROBLEM SELECTOR PLAN 7
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 Started on nivolumab, then added ipilimumab, complicated by immune-related diarrhea on recent admission.  - outpt oncology follow up w Dr Oakley CAD s/p CABG x3  in 2010, bradycardia s/p PPM in 2017  - c/w home atorvastatin 10 mg qD

## 2018-12-18 NOTE — CONSULT NOTE ADULT - ATTENDING COMMENTS
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 , receiving PO Vancomycin  Has received steroids for diarrhea previously , which was thought to be related to use of immunosuppressives   Son and daughter in law present  Patient supine in bed  Awake , and alert  Not obese  Both legs and arms with ecchymotic changes, legs more extensively involved, in this patient on Eliquis  Patient reports that appearance of both legs is unchanged over many years  Moderate edematous changes both feet and lower legs  Both lower legs and feet are cool to touch with no pedal pulses palpable  No limb threatening ischemia apparent  There is an approx 2 cm wound present right calf area , with lymphorrhea present  Some maceration of mona wound skin is apparent  No visible melanoma present  Wound dressed with foam , and loosely applied Gabo and ace  Op f/u info provided to patient and family  Consider baseline venous duplex of legs  remain available 87 yo male, re admitted yesterday with inability to walk , 2/2 multiple compression fx of spine  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 , receiving PO Vancomycin  Has received steroids for diarrhea previously , which was thought to be related to use of immunosuppressives   Son and daughter in law present  Patient supine in bed  Awake , and alert  Not obese  Both legs and arms with ecchymotic changes, legs more extensively involved, in this patient on Eliquis  Patient reports that appearance of both legs is unchanged over many years  Moderate edematous changes both feet and lower legs  Both lower legs and feet are cool to touch with no pedal pulses palpable  No limb threatening ischemia apparent  There is an approx 2 cm wound present right calf area , with lymphorrhea present  Some maceration of mona wound skin is apparent  No visible melanoma present  Wound dressed with foam , and loosely applied Gabo and ace  Op f/u info provided to patient and family  Consider baseline venous duplex of legs  remain available 87 yo male, re admitted yesterday with inability to walk , 2/2 multiple compression fx of spine  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 , receiving PO Vancomycin  Has received steroids for diarrhea previously , which was thought to be related to use of immunosuppressives   Son and daughter in law present  Patient supine in bed  Awake , and alert  Not obese  Both legs and arms with ecchymotic changes, legs more extensively involved, in this patient on Eliquis  Patient reports that appearance of both legs is unchanged over many years  Moderate edematous changes both feet and lower legs  Both lower legs and feet are cool to touch with no pedal pulses palpable  No limb threatening ischemia apparent  There is an approx 2 cm wound present right calf area , with lymphorrhea present  Some maceration of mona wound skin is apparent  No visible melanoma present  Wound dressed with foam , and loosely applied Gabo and ace  Op f/u info provided to patient and family  Consider baseline venous duplex of legs  remain available 89 yo male, re admitted yesterday with inability to walk , 2/2 multiple compression fx of spine  Former smoker, quit 1965  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 , receiving PO Vancomycin  Has received steroids for diarrhea previously , which was thought to be related to use of immunosuppressives   Son and daughter in law present  Patient supine in bed  Awake , and alert  Not obese  Both legs and arms with ecchymotic changes, legs more extensively involved, in this patient on Eliquis  Patient reports that appearance of both legs is unchanged over many years  Moderate edematous changes both feet and lower legs  Both lower legs and feet are cool to touch with no pedal pulses palpable  No limb threatening ischemia apparent  There is an approx 2 cm wound present right calf area , with lymphorrhea present  Some maceration of mona wound skin is apparent  No visible melanoma present  Wound dressed with foam , and loosely applied Gabo and ace  Op f/u info provided to patient and family  Consider baseline venous and arterial duplex of legs  remain available

## 2018-12-18 NOTE — CONSULT NOTE ADULT - ASSESSMENT
88M w/ acute T11, L1, and L2 vertebral compression fractures  Analgesia  DVT ppx  Incentive spirometry  Care per medical team  FU TLSO brace  WBAT w/ brace for comfort  P/T  No acute surgical intervention  Will d/w attending any further recommendations
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  Pt noted to be Cdif (+).    RLE melanoma & BLE PVD w/ weeping lesions- Allevyn  Compression BLE  Consider TAZ/PVR, XRay, Duplex  BLE elevation  Abx per Medicine/ ID  Moisturize intact skin w/ SWEEN cream BID  con't Nutrition (as tolerated), Nutrition Consult  con't Offloading   con't Pericare  Care as per medicine will follow w/ you  Upon discharge f/u as outpatient at [facility] [address] [place] [address] [phone]  Seen w/ attng and D/w team  Thank you for this consult  Sosa Ortez PA-C [id number]
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  Leukocytosis/bands, no fever  C diff positive  S/p treatment course for prior sphingomonas bacteremia  Otherwise appears clinically well, but still with diarrhea  Also with back pain 2/2 compression fracture and LE wound--chronic  Overall, C diff colitis, leukocytosis, compression fracture back  - Vanco 125mg po q 6  - F/U pending BCXs  - Monitor stool count, frequent abd exams  - F/U ortho and wound care service  - Discussed with medicine attending    Cuba Lopez MD  Pager [phone]  After 5pm and on weekends call [phone]

## 2018-12-18 NOTE — PROGRESS NOTE ADULT - SUBJECTIVE AND OBJECTIVE BOX
***INCOMPLETE***      INTERVAL EVENTS / SUBJECTIVE:    ***    OBJECTIVE:  Vital Signs Last 24 Hrs  T(C): 36.9 (18 Dec 2018 04:18), Max: 36.9 (18 Dec 2018 04:18)  T(F): 98.4 (18 Dec 2018 04:18), Max: 98.4 (18 Dec 2018 04:18)  HR: 101 (18 Dec 2018 06:49) (97 - 109)  BP: 148/94 (18 Dec 2018 06:49) (124/81 - 148/94)  BP(mean): --  RR: 18 (18 Dec 2018 04:18) (18 - 18)  SpO2: 99% (18 Dec 2018 04:18) (94% - 99%)    12-17 @ 07:01  -  12-18 @ 07:00  --------------------------------------------------------  IN: 1402.5 mL / OUT: 0 mL / NET: 1402.5 mL    12-18 @ 07:01  -  12-18 @ 12:30  --------------------------------------------------------  IN: 200 mL / OUT: 0 mL / NET: 200 mL      CAPILLARY BLOOD GLUCOSE    POCT Blood Glucose.: 131 mg/dL (18 Dec 2018 08:12)      PHYSICAL EXAM:    ***    HOSPITAL MEDICATIONS:  acetaminophen   Tablet .. 650 milliGRAM(s) Oral every 6 hours PRN Mild Pain (1 - 3)  amLODIPine   Tablet 5 milliGRAM(s) Oral daily  apixaban 2.5 milliGRAM(s) Oral every 12 hours  atorvastatin 10 milliGRAM(s) Oral at bedtime  BACItracin   Ointment 1 Application(s) Topical two times a day  calcium carbonate    500 mG (Tums) Chewable 1 Tablet(s) Chew daily  cholecalciferol 1000 Unit(s) Oral daily  clotrimazole Lozenge 1 Lozenge Oral five times a day  cyanocobalamin 1000 MICROGram(s) Oral daily  oxyCODONE    IR 5 milliGRAM(s) Oral every 4 hours PRN Severe Pain (7 - 10)  predniSONE   Tablet 25 milliGRAM(s) Oral two times a day  vancomycin    Solution 125 milliGRAM(s) Oral every 6 hours      LABS:                        14.8   14.9  )-----------( 145      ( 18 Dec 2018 09:49 )             45.7     Hgb Trend: 14.8<--, 14.8<--  12-18    136  |  98  |  97<H>  ----------------------------<  153<H>  4.8   |  20<L>  |  3.90<H>    Ca    8.1<L>      18 Dec 2018 09:39  Phos  7.7     12-18  Mg     2.6     12-18    TPro  5.0<L>  /  Alb  2.2<L>  /  TBili  0.5  /  DBili  x   /  AST  29  /  ALT  28  /  AlkPhos  125<H>  12-18    Creatinine Trend: 3.90<--, 3.79<--, 1.50<--, 1.65<--, 1.38<--, 1.34<-- INTERVAL EVENTS / SUBJECTIVE:    Ortho consulted for L1-L2 compression fracture, rec'd  brace. Ordered renal U/S and started gentle IV fluid hydration i/s/o JOSE MARIA, sending Ulytes today. Started prednisone taper to 50mg. Cdiff positive, started on PO vancomycin. No acute events overnight. Pt seen and examined at bedside this am. Pt reports pain well-controlled w Tylenol + oxycodone. Reports poor appetite, fatigue, loose stools. Denies fever, chills, night sweats, chest pain, palpitations.    OBJECTIVE:  Vital Signs Last 24 Hrs  T(C): 36.9 (18 Dec 2018 04:18), Max: 36.9 (18 Dec 2018 04:18)  T(F): 98.4 (18 Dec 2018 04:18), Max: 98.4 (18 Dec 2018 04:18)  HR: 101 (18 Dec 2018 06:49) (97 - 109)  BP: 148/94 (18 Dec 2018 06:49) (124/81 - 148/94)  BP(mean): --  RR: 18 (18 Dec 2018 04:18) (18 - 18)  SpO2: 99% (18 Dec 2018 04:18) (94% - 99%)    12-17 @ 07:01  -  12-18 @ 07:00  --------------------------------------------------------  IN: 1402.5 mL / OUT: 0 mL / NET: 1402.5 mL    12-18 @ 07:01  -  12-18 @ 12:30  --------------------------------------------------------  IN: 200 mL / OUT: 0 mL / NET: 200 mL      CAPILLARY BLOOD GLUCOSE    POCT Blood Glucose.: 131 mg/dL (18 Dec 2018 08:12)      PHYSICAL EXAM:  	GENERAL: No acute distress, well-developed  	HEAD: Atraumatic, Normocephalic  	ENT: EOMI, PERRL, conjunctiva and sclera clear, moist mucosa no pharyngeal erythema or exudates   	NECK: supple , no JVD   	CHEST/LUNG: Clear to auscultation bilaterally; No wheeze, equal breath sounds bilaterally   	BACK: No spinal tenderness,  No CVA tenderness. No tenderness to palpation of spine. Lidocaine patch in place.  	HEART: Regular rate and rhythm; No murmurs, rubs, or gallops  	ABDOMEN: Soft, Nontender, Nondistended; Bowel sounds present  	MSK: No joint swelling or effusions. ROM intact   	Extremity . no clubbing, no cyanosis, b/l lower extremity pigmentation , some weeping ulcers   	PSYCH: Normal behavior/affect  	NEUROLOGY: AAOx3, non-focal, cranial nerves grossly intact. Motor in LE - limited exam but grossly 5/5 . UE - 5/5   SKIN: bilat lower extremities w/ trace edema s/p skin grafts b/l pigmented skin, hematomas, and weeping wounds.      HOSPITAL MEDICATIONS:  acetaminophen   Tablet .. 650 milliGRAM(s) Oral every 6 hours PRN Mild Pain (1 - 3)  amLODIPine   Tablet 5 milliGRAM(s) Oral daily  apixaban 2.5 milliGRAM(s) Oral every 12 hours  atorvastatin 10 milliGRAM(s) Oral at bedtime  BACItracin   Ointment 1 Application(s) Topical two times a day  calcium carbonate    500 mG (Tums) Chewable 1 Tablet(s) Chew daily  cholecalciferol 1000 Unit(s) Oral daily  clotrimazole Lozenge 1 Lozenge Oral five times a day  cyanocobalamin 1000 MICROGram(s) Oral daily  oxyCODONE    IR 5 milliGRAM(s) Oral every 4 hours PRN Severe Pain (7 - 10)  predniSONE   Tablet 25 milliGRAM(s) Oral two times a day  vancomycin    Solution 125 milliGRAM(s) Oral every 6 hours      LABS:                        14.8   14.9  )-----------( 145      ( 18 Dec 2018 09:49 )             45.7     Hgb Trend: 14.8<--, 14.8<--  12-18    136  |  98  |  97<H>  ----------------------------<  153<H>  4.8   |  20<L>  |  3.90<H>    Ca    8.1<L>      18 Dec 2018 09:39  Phos  7.7     12-18  Mg     2.6     12-18    TPro  5.0<L>  /  Alb  2.2<L>  /  TBili  0.5  /  DBili  x   /  AST  29  /  ALT  28  /  AlkPhos  125<H>  12-18    Creatinine Trend: 3.90<--, 3.79<--, 1.50<--, 1.65<--, 1.38<--, 1.34<--      RADIOLOGY:    	12/17 CT cervical, thoracic, and lumbar spine:    	Cervical spine CT: No acute fracture or traumatic subluxation. Multilevel degenerative changes.    	Thoracic spine CT: T9 vertebral body compression fracture of indeterminate age. Progressive compression fracture at T12.  New is examination were discussed with Dr. Burris at 10:35 AM on 12/17/2018 compression deformity at T11.    	Lumbar spine CT: New compression deformities of the L1 and L2 vertebral bodies. Stable compression fracture of L4.      	12/17 CXR:    	There are median sternotomy wires, status post CABG. A pacemaker device overlies left hemithorax.  	Cardiac silhouette is within normal limits.  	Left basilar subsegmental atelectasis.  	No pleural effusions or pneumothorax.      	11/28 CT A/P:  	IMPRESSION: No bowel obstruction or bowel wall thickening.    	Peripancreatic cystic lesion in the region of the pancreatic head which is stable in size compared to prior exam 6/7/2018 and was not FDG avid.   	Differential includes peripancreatic fluid collection versus cystic neoplasm. Further evaluation with MRCP with and without contrast can be obtained.    	Degenerative changes of the spine with compression deformity of the L4 vertebral body with mild bony retropulsion.      	6/7/18 PET/CT:    	IMPRESSION:  Abnormal whole body FDG-PET/CT scan.    	1. Nonspecific small focus of mildly FDG-avid skin thickening, anterior aspect of right ankle. Please correlate clinically.    	2. Nonspecific, minimally FDG-avid skin thickening along medial aspect of left mid/distal thigh, possibly postsurgical. Please correlate clinically.    	3. Remainder of study is unremarkable, demonstrating no evidence of FDG-avid disease. INTERVAL EVENTS / SUBJECTIVE:    Ortho consulted for T11, L1, L2 compression fractures, rec'd brace, rec'd against surgical intervention. Ordered renal U/S and started gentle IV fluid hydration i/s/o JOSE MARIA, sending Ulytes today, renal US pending, bladder scan w 220cc. Started prednisone taper to 25mg bid. Cdiff positive, started on PO vancomycin. No acute events overnight. Pt seen and examined at bedside this am. Pt reports pain well-controlled w Tylenol + oxycodone. Reports poor appetite, fatigue, loose stools. Denies fever, chills, night sweats, chest pain, palpitations.    OBJECTIVE:  Vital Signs Last 24 Hrs  T(C): 36.9 (18 Dec 2018 04:18), Max: 36.9 (18 Dec 2018 04:18)  T(F): 98.4 (18 Dec 2018 04:18), Max: 98.4 (18 Dec 2018 04:18)  HR: 101 (18 Dec 2018 06:49) (97 - 109)  BP: 148/94 (18 Dec 2018 06:49) (124/81 - 148/94)  BP(mean): --  RR: 18 (18 Dec 2018 04:18) (18 - 18)  SpO2: 99% (18 Dec 2018 04:18) (94% - 99%)    12-17 @ 07:01  -  12-18 @ 07:00  --------------------------------------------------------  IN: 1402.5 mL / OUT: 0 mL / NET: 1402.5 mL    12-18 @ 07:01 - 12-18 @ 12:30  --------------------------------------------------------  IN: 200 mL / OUT: 0 mL / NET: 200 mL      CAPILLARY BLOOD GLUCOSE    POCT Blood Glucose.: 131 mg/dL (18 Dec 2018 08:12)      PHYSICAL EXAM:  	GENERAL: No acute distress, well-developed  	HEAD: Atraumatic, Normocephalic  	ENT: EOMI, PERRL, conjunctiva and sclera clear, moist mucosa no pharyngeal erythema or exudates   	NECK: supple , no JVD   	CHEST/LUNG: Clear to auscultation bilaterally; No wheeze, equal breath sounds bilaterally   	BACK: No spinal tenderness,  No CVA tenderness. No tenderness to palpation of spine. Lidocaine patch in place.  	HEART: Regular rate and rhythm; No murmurs, rubs, or gallops  	ABDOMEN: Soft, Nontender, Nondistended; Bowel sounds present  	MSK: No joint swelling or effusions. ROM intact   	Extremity . no clubbing, no cyanosis, b/l lower extremity pigmentation , some weeping ulcers   	PSYCH: Normal behavior/affect  	NEUROLOGY: AAOx3, non-focal, cranial nerves grossly intact. Motor in LE - limited exam but grossly 5/5 . UE - 5/5     SKIN: bilat lower extremities w/ trace edema s/p skin grafts b/l pigmented skin, hematomas, and weeping wounds.    HOSPITAL MEDICATIONS:  acetaminophen   Tablet .. 650 milliGRAM(s) Oral every 6 hours PRN Mild Pain (1 - 3)  amLODIPine   Tablet 5 milliGRAM(s) Oral daily  apixaban 2.5 milliGRAM(s) Oral every 12 hours  atorvastatin 10 milliGRAM(s) Oral at bedtime  BACItracin   Ointment 1 Application(s) Topical two times a day  calcium carbonate    500 mG (Tums) Chewable 1 Tablet(s) Chew daily  cholecalciferol 1000 Unit(s) Oral daily  clotrimazole Lozenge 1 Lozenge Oral five times a day  cyanocobalamin 1000 MICROGram(s) Oral daily  oxyCODONE    IR 5 milliGRAM(s) Oral every 4 hours PRN Severe Pain (7 - 10)  predniSONE   Tablet 25 milliGRAM(s) Oral two times a day  vancomycin    Solution 125 milliGRAM(s) Oral every 6 hours      LABS:                        14.8   14.9  )-----------( 145      ( 18 Dec 2018 09:49 )             45.7     Hgb Trend: 14.8<--, 14.8<--  12-18    136  |  98  |  97<H>  ----------------------------<  153<H>  4.8   |  20<L>  |  3.90<H>    Ca    8.1<L>      18 Dec 2018 09:39  Phos  7.7     12-18  Mg     2.6     12-18    TPro  5.0<L>  /  Alb  2.2<L>  /  TBili  0.5  /  DBili  x   /  AST  29  /  ALT  28  /  AlkPhos  125<H>  12-18    Creatinine Trend: 3.90<--, 3.79<--, 1.50<--, 1.65<--, 1.38<--, 1.34<--      RADIOLOGY:    	12/17 CT cervical, thoracic, and lumbar spine:    	Cervical spine CT: No acute fracture or traumatic subluxation. Multilevel degenerative changes.    	Thoracic spine CT: T9 vertebral body compression fracture of indeterminate age. Progressive compression fracture at T12.  New is examination were discussed with Dr. Burris at 10:35 AM on 12/17/2018 compression deformity at T11.    	Lumbar spine CT: New compression deformities of the L1 and L2 vertebral bodies. Stable compression fracture of L4.      	12/17 CXR:    	There are median sternotomy wires, status post CABG. A pacemaker device overlies left hemithorax.  	Cardiac silhouette is within normal limits.  	Left basilar subsegmental atelectasis.  	No pleural effusions or pneumothorax.      	11/28 CT A/P:  	IMPRESSION: No bowel obstruction or bowel wall thickening.    	Peripancreatic cystic lesion in the region of the pancreatic head which is stable in size compared to prior exam 6/7/2018 and was not FDG avid.   	Differential includes peripancreatic fluid collection versus cystic neoplasm. Further evaluation with MRCP with and without contrast can be obtained.    	Degenerative changes of the spine with compression deformity of the L4 vertebral body with mild bony retropulsion.      	6/7/18 PET/CT:    	IMPRESSION:  Abnormal whole body FDG-PET/CT scan.    	1. Nonspecific small focus of mildly FDG-avid skin thickening, anterior aspect of right ankle. Please correlate clinically.    	2. Nonspecific, minimally FDG-avid skin thickening along medial aspect of left mid/distal thigh, possibly postsurgical. Please correlate clinically.    	3. Remainder of study is unremarkable, demonstrating no evidence of FDG-avid disease. INTERVAL EVENTS / SUBJECTIVE:    Ortho consulted for T11, L1, L2 compression fractures, rec'd brace, rec'd against surgical intervention. Ordered renal U/S and started gentle IV fluid hydration i/s/o JOSE MARIA, sending Ulytes today, renal US pending, bladder scan w 220cc. Started prednisone taper to 25mg bid. Cdiff positive, started on PO vancomycin. No acute events overnight. Pt seen and examined at bedside this am. Pt reports pain well-controlled w Tylenol + oxycodone. Reports poor appetite, fatigue, loose stools. Denies fever, chills, night sweats, chest pain, palpitations, or any difficulty urinating or passing stool.    OBJECTIVE:  Vital Signs Last 24 Hrs  T(C): 36.9 (18 Dec 2018 04:18), Max: 36.9 (18 Dec 2018 04:18)  T(F): 98.4 (18 Dec 2018 04:18), Max: 98.4 (18 Dec 2018 04:18)  HR: 101 (18 Dec 2018 06:49) (97 - 109)  BP: 148/94 (18 Dec 2018 06:49) (124/81 - 148/94)  BP(mean): --  RR: 18 (18 Dec 2018 04:18) (18 - 18)  SpO2: 99% (18 Dec 2018 04:18) (94% - 99%)    12-17 @ 07:01  -  12-18 @ 07:00  --------------------------------------------------------  IN: 1402.5 mL / OUT: 0 mL / NET: 1402.5 mL    12-18 @ 07:01  -  12-18 @ 12:30  --------------------------------------------------------  IN: 200 mL / OUT: 0 mL / NET: 200 mL      CAPILLARY BLOOD GLUCOSE    POCT Blood Glucose.: 131 mg/dL (18 Dec 2018 08:12)      PHYSICAL EXAM:  	GENERAL: No acute distress, well-developed  	HEAD: Atraumatic, Normocephalic  	ENT: EOMI, PERRL, conjunctiva and sclera clear, moist mucosa no pharyngeal erythema or exudates   	NECK: supple , no JVD   	CHEST/LUNG: Clear to auscultation bilaterally; No wheeze, equal breath sounds bilaterally   	BACK: No spinal tenderness,  No CVA tenderness. No tenderness to palpation of spine. Lidocaine patch in place.  	HEART: Regular rate and rhythm; No murmurs, rubs, or gallops  	ABDOMEN: Soft, Nontender, Nondistended; Bowel sounds present  	MSK: No joint swelling or effusions. ROM intact   	Extremity . no clubbing, no cyanosis, b/l lower extremity pigmentation , some weeping ulcers   	PSYCH: Normal behavior/affect  	NEUROLOGY: AAOx3, non-focal, cranial nerves grossly intact. Motor in LE - limited exam but grossly 5/5 . UE - 5/5     SKIN: bilat lower extremities w/ trace edema s/p skin grafts b/l pigmented skin, hematomas, and weeping wounds.    HOSPITAL MEDICATIONS:  acetaminophen   Tablet .. 650 milliGRAM(s) Oral every 6 hours PRN Mild Pain (1 - 3)  amLODIPine   Tablet 5 milliGRAM(s) Oral daily  apixaban 2.5 milliGRAM(s) Oral every 12 hours  atorvastatin 10 milliGRAM(s) Oral at bedtime  BACItracin   Ointment 1 Application(s) Topical two times a day  calcium carbonate    500 mG (Tums) Chewable 1 Tablet(s) Chew daily  cholecalciferol 1000 Unit(s) Oral daily  clotrimazole Lozenge 1 Lozenge Oral five times a day  cyanocobalamin 1000 MICROGram(s) Oral daily  oxyCODONE    IR 5 milliGRAM(s) Oral every 4 hours PRN Severe Pain (7 - 10)  predniSONE   Tablet 25 milliGRAM(s) Oral two times a day  vancomycin    Solution 125 milliGRAM(s) Oral every 6 hours      LABS:                        14.8   14.9  )-----------( 145      ( 18 Dec 2018 09:49 )             45.7     Hgb Trend: 14.8<--, 14.8<--  12-18    136  |  98  |  97<H>  ----------------------------<  153<H>  4.8   |  20<L>  |  3.90<H>    Ca    8.1<L>      18 Dec 2018 09:39  Phos  7.7     12-18  Mg     2.6     12-18    TPro  5.0<L>  /  Alb  2.2<L>  /  TBili  0.5  /  DBili  x   /  AST  29  /  ALT  28  /  AlkPhos  125<H>  12-18    Creatinine Trend: 3.90<--, 3.79<--, 1.50<--, 1.65<--, 1.38<--, 1.34<--      RADIOLOGY:    	12/17 CT cervical, thoracic, and lumbar spine:    	Cervical spine CT: No acute fracture or traumatic subluxation. Multilevel degenerative changes.    	Thoracic spine CT: T9 vertebral body compression fracture of indeterminate age. Progressive compression fracture at T12.  New is examination were discussed with Dr. Burris at 10:35 AM on 12/17/2018 compression deformity at T11.    	Lumbar spine CT: New compression deformities of the L1 and L2 vertebral bodies. Stable compression fracture of L4.      	12/17 CXR:    	There are median sternotomy wires, status post CABG. A pacemaker device overlies left hemithorax.  	Cardiac silhouette is within normal limits.  	Left basilar subsegmental atelectasis.  	No pleural effusions or pneumothorax.      	11/28 CT A/P:  	IMPRESSION: No bowel obstruction or bowel wall thickening.    	Peripancreatic cystic lesion in the region of the pancreatic head which is stable in size compared to prior exam 6/7/2018 and was not FDG avid.   	Differential includes peripancreatic fluid collection versus cystic neoplasm. Further evaluation with MRCP with and without contrast can be obtained.    	Degenerative changes of the spine with compression deformity of the L4 vertebral body with mild bony retropulsion.      	6/7/18 PET/CT:    	IMPRESSION:  Abnormal whole body FDG-PET/CT scan.    	1. Nonspecific small focus of mildly FDG-avid skin thickening, anterior aspect of right ankle. Please correlate clinically.    	2. Nonspecific, minimally FDG-avid skin thickening along medial aspect of left mid/distal thigh, possibly postsurgical. Please correlate clinically.    	3. Remainder of study is unremarkable, demonstrating no evidence of FDG-avid disease.

## 2018-12-18 NOTE — PROGRESS NOTE ADULT - PROBLEM SELECTOR PLAN 1
CT of cervical, thoracic, and lumbar spine revealed prior and new compression fractures i/s/o steroids, specifically L4 appeared stable, T12 progression, and new compression fractures of T12, L1, & L2  no neurological deficit   - ortho spine consult  - c/w lidocaine patch, tylenol prn mild pain, oxycodone prn severe pain  - c/w home vit D 1000 U qD  - start calcium carbonate 500 mg qD  - PT eval CT of cervical, thoracic, and lumbar spine revealed prior and new compression fractures i/s/o steroids, specifically L4 appeared stable, T12 progression, and new compression fractures of T12, L1, & L2  no neurological deficit   - c/w lidocaine patch, tylenol prn mild pain, oxycodone prn severe pain  - WBAT & FUTLSO brace  - c/w home vit D 1000 U qD & calcium carbonate 500 mg qD  - start calcium carbonate 500 mg qD  - PT eval  - appreciate ortho recs

## 2018-12-18 NOTE — PROGRESS NOTE ADULT - ASSESSMENT
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

## 2018-12-19 VITALS — SYSTOLIC BLOOD PRESSURE: 90 MMHG | DIASTOLIC BLOOD PRESSURE: 60 MMHG

## 2018-12-19 LAB — UUN UR-MCNC: 655 MG/DL — SIGNIFICANT CHANGE UP

## 2018-12-19 PROCEDURE — 83735 ASSAY OF MAGNESIUM: CPT

## 2018-12-19 PROCEDURE — 87324 CLOSTRIDIUM AG IA: CPT

## 2018-12-19 PROCEDURE — 72128 CT CHEST SPINE W/O DYE: CPT

## 2018-12-19 PROCEDURE — 74018 RADEX ABDOMEN 1 VIEW: CPT

## 2018-12-19 PROCEDURE — 84100 ASSAY OF PHOSPHORUS: CPT

## 2018-12-19 PROCEDURE — 82962 GLUCOSE BLOOD TEST: CPT

## 2018-12-19 PROCEDURE — 71045 X-RAY EXAM CHEST 1 VIEW: CPT

## 2018-12-19 PROCEDURE — 93005 ELECTROCARDIOGRAM TRACING: CPT

## 2018-12-19 PROCEDURE — 83935 ASSAY OF URINE OSMOLALITY: CPT

## 2018-12-19 PROCEDURE — 93010 ELECTROCARDIOGRAM REPORT: CPT

## 2018-12-19 PROCEDURE — 87040 BLOOD CULTURE FOR BACTERIA: CPT

## 2018-12-19 PROCEDURE — 72100 X-RAY EXAM L-S SPINE 2/3 VWS: CPT

## 2018-12-19 PROCEDURE — 84300 ASSAY OF URINE SODIUM: CPT

## 2018-12-19 PROCEDURE — 83930 ASSAY OF BLOOD OSMOLALITY: CPT

## 2018-12-19 PROCEDURE — 72131 CT LUMBAR SPINE W/O DYE: CPT

## 2018-12-19 PROCEDURE — 80053 COMPREHEN METABOLIC PANEL: CPT

## 2018-12-19 PROCEDURE — 85027 COMPLETE CBC AUTOMATED: CPT

## 2018-12-19 PROCEDURE — 72125 CT NECK SPINE W/O DYE: CPT

## 2018-12-19 PROCEDURE — 74018 RADEX ABDOMEN 1 VIEW: CPT | Mod: 26

## 2018-12-19 PROCEDURE — 99285 EMERGENCY DEPT VISIT HI MDM: CPT

## 2018-12-19 PROCEDURE — 84540 ASSAY OF URINE/UREA-N: CPT

## 2018-12-19 PROCEDURE — 87449 NOS EACH ORGANISM AG IA: CPT

## 2018-12-19 RX ORDER — SODIUM CHLORIDE 9 MG/ML
1000 INJECTION INTRAMUSCULAR; INTRAVENOUS; SUBCUTANEOUS ONCE
Qty: 0 | Refills: 0 | Status: COMPLETED | OUTPATIENT
Start: 2018-12-19 | End: 2018-12-19

## 2018-12-19 RX ORDER — OXYCODONE AND ACETAMINOPHEN 5; 325 MG/1; MG/1
1 TABLET ORAL ONCE
Qty: 0 | Refills: 0 | Status: DISCONTINUED | OUTPATIENT
Start: 2018-12-19 | End: 2018-12-19

## 2018-12-19 RX ORDER — MORPHINE SULFATE 50 MG/1
4 CAPSULE, EXTENDED RELEASE ORAL ONCE
Qty: 0 | Refills: 0 | Status: DISCONTINUED | OUTPATIENT
Start: 2018-12-19 | End: 2018-12-19

## 2018-12-19 RX ADMIN — SODIUM CHLORIDE 1000 MILLILITER(S): 9 INJECTION INTRAMUSCULAR; INTRAVENOUS; SUBCUTANEOUS at 01:44

## 2018-12-19 NOTE — PROVIDER CONTACT NOTE (OTHER) - ACTION/TREATMENT ORDERED:
Behavioral Health Progress Note    This patient was seen by the [facility] from 11am to 11:45am for a total of 45 minutes. Psychotropic medication changes: None    Diagnosis: Adjustment Disorder with Anxiety and Depressed Mood    Description of session/progress/interventions: Kim Davis reported that overall she felt she was doing better. She was slightly tearful on and off during session. She reported being very fearful that her difficulty concentrating and inability to get tasks around the house done may be related to her post-concussion syndrome. We discussed that grief can cause these sx as well but was given the name and contact information for Dr. Lawyer Bravo, neuropsychologist. She stated she would contact him and schedule testing. She was also given information on additional grief support groups. She also stated that she was having some trouble sleeping and we discussed trying a very low dose of melatonin, as she said she is very sensitive to medications. She has plans to contact orthopedist for her knee pain. Assessment/Plan: Symptoms improved but still creating distress for her. She will follow up on recommendations and return in about a month.
STAT Abd X-ray at bedside. bolus NS 1000ml, EkG, recheck BP prior to Ativan and percocet. CT abd w/o contrast ordered. no STAT cbc needed at this time.

## 2018-12-19 NOTE — CHART NOTE - NSCHARTNOTEFT_GEN_A_CORE
Expiration Note-    Assessed patient at bedside as patient appears to not be breathing, was found to be  pulseless . Pt unresponsive to verbal, physical and painful stimuli.   Pupils were not reactive, were fixed and dilated.   There was no carotid or femoral pulse. There were no heart or lung sounds. sounds.     Time of death: 14:37 AM December, 19, 2018  Attending: Hospitalist notified.  Family member  Sigrid jewell notified.  They  not be coming to the hospital. She does not want a an autopsy to be performed      PGY- 1 , Rach Trammell M.D

## 2018-12-19 NOTE — CHART NOTE - NSCHARTNOTEFT_GEN_A_CORE
Patient examined at bedside at 1:30 AM. Notified by RN patient is having 10/10 abdominal pain requesting pain medicine with slight blood tinged sputum. Vitals with evidence of hypotention by mechanical and manual blood pressure. Pt examined, found to have hypoactive bowel sounds, no guarding, no rebound tenderness. Pain diffuse in all four quadrants. MAR made aware and additionally evaluated patient at bedside. Stat abdominal xray with preliminary read of dilated bowel. ordered non con CT to further evaluate abdomen and assist with ruling out consequences of c diff such as toxic megacolon, pneumatosis intestinalis etc . Ordered 1 L bolus of fluids. will re-eval blood pressure.  Pain complaining of nausea, previous ekg with findings of prolonged qtc.  Reordered EKG . If blood pressure returns, notified RN to provide .5 ativan for Nausea. PO meds such as percocet ordered as PRN. Protocoled CT to have this done urgently.       RN to re-evaluate blood pressure after 500 cc, may need stat CBC and lactate if it does not resume.    Rach Gaspar M.D PGY 1 pager #[phone] night float

## 2018-12-19 NOTE — DISCHARGE NOTE FOR THE EXPIRED PATIENT - HOSPITAL COURSE
Patient was a 88 year old male with past medical history of HTN, HLD, DM, CAD s/p CABG x3  in 2010, bradycardia s/p PPM in 2017, afib on Eliquis, metastatic melanoma of R ankle s/p excision and skin grafts  on Opvido and Yervoy, and recent admission for  diarrhea and bacteremia  who presented to the hospital for   2 weeks history of progressive back pain. He denied any numbness, tingling , focal weakness, urinary or faecal incontinence. he denied any h/o fever , chills or night sweat. He also stated  that at some his twisted his back.   Pt reports he was last able to walk normally in early november prior to recent admission. On that admission, patient reported diarrhea consisted of  3-4 episodes of watery stool a day over the past month, symptoms were attributed  to the immunotherapy  and patient had been on prednisone  which improved his symptoms. During admission, pt had been found to have L4 compression fracture. Had reported diarrheal episodes and recently starting  fluconazole. Hospital course compicated by findings of C difficile, started on PO vancomyocin and findings of new Compression fractures specifically L4 appeared stable, T12 progression, and new compression fractures of T12, L1, & L2. Patient chose to be DNR/ DNR with comfort care only on 10/18.     Patient experienced 10/10 abdominal pain and was found to be hypotensive on  at 1 AM.  Bowel sounds hypoactive, no signs of guarding, rebounding tenderness/ peritoneal signs. Abdominal xray with findings of colon dilation. Was given 1 L of Normal Saline and was the process of being transported to CT for further evaluation  when RN found patient to be unresponsive. Had a weak pulse and soonly  following that time. at 2:37 AM. Family notified by phone with no desire for autopsy or coming to the hospital.

## 2018-12-19 NOTE — DISCHARGE NOTE FOR THE EXPIRED PATIENT - OTHER SIGNIFICANT FINDINGS
MADIHA Nuno < from: CT Cervical Spine No Cont (12.17.18 @ 10:18) >  Impression:    Cervical spine CT: No acute fracture or traumatic subluxation. Multilevel   degenerative changes.    Thoracic spine CT: T9 vertebral body compression fracture of   indeterminate age. Progressive compression fracture at T12.  New is   examination were discussed with Dr. Burris at 10:35 AM on 12/17/2018   compression deformity at T11.    Lumbar spine CT: New compression deformities of the L1 and L2 vertebral   bodies.  Stable compression fracture of L4.       < end of copied text >

## 2018-12-19 NOTE — CHART NOTE - NSCHARTNOTEFT_GEN_A_CORE
MAR Chart Note:  Called by night float intern for abdominal pain and hypotension. He noted epigastric pain but was resting comfortably. On exam, he had hypoactive bowel sounds, mild epigastric TTP, without rebound or guarding. Preliminary read discussed between night float intern and radiologist showed dilated colon. No evidence of free air or pneumatosis per my inspection but concern for toxic megacolon. Patient was noted to be hypotensive, 90/60, previously 133/87, HR remaining 100s, and O2 saturating well on room air. He was immediately given a 1L bolus and urgent CT was ordered, without contrast due to JOSE MARIA. Within minutes, patient . He was unresponsive to sternal rub or verbal commands, pupils were fixed and dilated, no breath sounds or heart sounds were auscultated, and no pulses were palpable. Family was notified. Suspected cause of mesenteric ischemia.    Aryles Hedjar, PGY-3  MAR, Pager [phone]  Department of Internal Medicine

## 2018-12-23 LAB
CULTURE RESULTS: SIGNIFICANT CHANGE UP
CULTURE RESULTS: SIGNIFICANT CHANGE UP
SPECIMEN SOURCE: SIGNIFICANT CHANGE UP
SPECIMEN SOURCE: SIGNIFICANT CHANGE UP

## 2018-12-27 ENCOUNTER — APPOINTMENT (OUTPATIENT)
Dept: NUCLEAR MEDICINE | Facility: IMAGING CENTER | Age: 83
End: 2018-12-27

## 2018-12-27 ENCOUNTER — APPOINTMENT (OUTPATIENT)
Dept: ULTRASOUND IMAGING | Facility: IMAGING CENTER | Age: 83
End: 2018-12-27

## 2018-12-31 ENCOUNTER — INBOUND DOCUMENT (OUTPATIENT)
Age: 83
End: 2018-12-31

## 2020-10-17 NOTE — PROGRESS NOTE ADULT - PROBLEM SELECTOR PLAN 2
OB thought to be secondary to immunotherapy, currently afebrile   - c/w home prednisone 20 mg tid Likely pre-renal. Bladder scan 220 cc.  - f/u renal US  - send Umm, Uosm, UBUN, serum osm  - hold lasix  - gentle IVF at 75 cc/hr  - monitor BMP  - avoid nephrotoxic agents  - renally dose medications.

## 2021-03-31 NOTE — PROGRESS NOTE ADULT - PROBLEM SELECTOR PLAN 2
- Improving  - Prednisone 20mg TID.  - Patient refusing GI eval.  - empiric meropenem as above, f/u ID recs  - Immunotherapy on hold for now. Partial Purse String (Simple) Text: Given the location of the defect and the characteristics of the surrounding skin a simple purse string closure was deemed most appropriate.  Undermining was performed circumferentially around the surgical defect.  A purse string suture was then placed and tightened. Wound tension of the circular defect prevented complete closure of the wound.

## 2021-04-06 NOTE — ED ADULT NURSE NOTE - ABDOMEN
There are no preventive care reminders to display for this patient.    Patient is up to date, no discussion needed.           soft

## 2022-04-07 NOTE — ED PROCEDURE NOTE - CPROC ED PHYSICIAN PRESENCE1
Addended by: STEVIE MATA on: 4/7/2022 01:56 PM     Modules accepted: Orders     I was present during the key portion of the procedure.

## 2023-02-09 NOTE — PATIENT PROFILE ADULT - NSPROMEDSBROUGHTTOHOSP_GEN_A_NUR
Discharge instructions including the importance of hydration, the use of OTC medications, information on 1. Fall, initial encounter     and the proper follow up recommendations have been provided. Verbalizes understanding.  Confirms all questions have been answered.  A copy of the discharge instructions have been provided.  A signed copy is in the chart.  All pertinent medications reviewed.   Child out of department; pt in NAD, awake, alert, interactive and age appropriate     
Patient roomed from [facility] to [place] [id number] with parents accompanying.  Mother reports fall from chair approximately 2 feet high, hitting chin on laminate ground, no LOC, mild bleeding from tongue.     Patient alert and smiling, skin pink, warm, dry and intact, no increase WOB noted, bleeding on tongue controlled.  Call light and TV remote introduced.  Chart up for ERP.    
20-Mar-2020 07:51
no

## 2024-12-16 NOTE — ED ADULT NURSE NOTE - NSFALLRSKOUTCOME_ED_ALL_ED
12/16/24 1102   Discharge Planning   Expected Discharge Disposition Home H     Patient is planned discharge home with home health care.  [facility] has accepted.    Discharge plan NOT secure  DO NOT DC without speaking to care coordination   Will need EXTERNAL home health care referral placed prior to discharge    Fall with Harm Risk

## 2025-07-16 NOTE — PROGRESS NOTE ADULT - PROBLEM SELECTOR PLAN 8
- lipitor Bed/Stretcher in lowest position, wheels locked, appropriate side rails in place/Call bell, personal items and telephone in reach/Instruct patient to call for assistance before getting out of bed/chair/stretcher/Non-slip footwear applied when patient is off stretcher/[place] to call system/Physically safe environment - no spills, clutter or unnecessary equipment/Purposeful proactive rounding/Room/bathroom lighting operational, light cord in reach